# Patient Record
Sex: FEMALE | Race: WHITE | NOT HISPANIC OR LATINO | Employment: PART TIME | URBAN - METROPOLITAN AREA
[De-identification: names, ages, dates, MRNs, and addresses within clinical notes are randomized per-mention and may not be internally consistent; named-entity substitution may affect disease eponyms.]

---

## 2017-02-19 ENCOUNTER — HOSPITAL ENCOUNTER (EMERGENCY)
Facility: HOSPITAL | Age: 47
Discharge: HOME | End: 2017-02-19
Attending: EMERGENCY MEDICINE | Admitting: EMERGENCY MEDICINE
Payer: COMMERCIAL

## 2017-02-19 VITALS
HEART RATE: 110 BPM | TEMPERATURE: 98.3 F | SYSTOLIC BLOOD PRESSURE: 170 MMHG | WEIGHT: 171.08 LBS | RESPIRATION RATE: 18 BRPM | OXYGEN SATURATION: 97 % | DIASTOLIC BLOOD PRESSURE: 94 MMHG

## 2017-02-19 DIAGNOSIS — J03.00 STREP TONSILLITIS: Primary | ICD-10-CM

## 2017-02-19 DIAGNOSIS — R31.9 HEMATURIA: ICD-10-CM

## 2017-02-19 LAB
ANION GAP SERPL CALCULATED.3IONS-SCNC: 10 MMOL/L (ref 4–13)
BACTERIA UR QL AUTO: ABNORMAL /HPF
BASOPHILS # BLD MANUAL: 0 THOUSAND/UL (ref 0–0.1)
BASOPHILS NFR MAR MANUAL: 0 % (ref 0–1)
BILIRUB UR QL STRIP: NEGATIVE
BUN SERPL-MCNC: 9 MG/DL (ref 5–25)
CALCIUM SERPL-MCNC: 8.9 MG/DL (ref 8.3–10.1)
CHLORIDE SERPL-SCNC: 103 MMOL/L (ref 100–108)
CK SERPL-CCNC: 70 U/L (ref 26–192)
CLARITY UR: ABNORMAL
CLARITY, POC: ABNORMAL
CO2 SERPL-SCNC: 26 MMOL/L (ref 21–32)
COLOR UR: YELLOW
COLOR, POC: ABNORMAL
CREAT SERPL-MCNC: 0.93 MG/DL (ref 0.6–1.3)
EOSINOPHIL # BLD MANUAL: 0 THOUSAND/UL (ref 0–0.4)
EOSINOPHIL NFR BLD MANUAL: 0 % (ref 0–6)
ERYTHROCYTE [DISTWIDTH] IN BLOOD BY AUTOMATED COUNT: 14.4 % (ref 11.6–15.1)
EXT BILIRUBIN, UA: NEGATIVE
EXT BLOOD URINE: ABNORMAL
EXT GLUCOSE, UA: NEGATIVE
EXT KETONES: NEGATIVE
EXT NITRITE, UA: NEGATIVE
EXT PH, UA: 6.5
EXT PROTEIN, UA: NEGATIVE
EXT SPECIFIC GRAVITY, UA: 1
EXT UROBILINOGEN: NEGATIVE
GFR SERPL CREATININE-BSD FRML MDRD: >60 ML/MIN/1.73SQ M
GLUCOSE SERPL-MCNC: 108 MG/DL (ref 65–140)
GLUCOSE UR STRIP-MCNC: NEGATIVE MG/DL
HCG UR QL: NEGATIVE
HCT VFR BLD AUTO: 36.2 % (ref 34.8–46.1)
HGB BLD-MCNC: 12.6 G/DL (ref 11.5–15.4)
HGB UR QL STRIP.AUTO: ABNORMAL
KETONES UR STRIP-MCNC: NEGATIVE MG/DL
LEUKOCYTE ESTERASE UR QL STRIP: NEGATIVE
LYMPHOCYTES # BLD AUTO: 0.53 THOUSAND/UL (ref 0.6–4.47)
LYMPHOCYTES # BLD AUTO: 3 % (ref 14–44)
MCH RBC QN AUTO: 28.3 PG (ref 26.8–34.3)
MCHC RBC AUTO-ENTMCNC: 34.8 G/DL (ref 31.4–37.4)
MCV RBC AUTO: 81 FL (ref 82–98)
MONOCYTES # BLD AUTO: 0.88 THOUSAND/UL (ref 0–1.22)
MONOCYTES NFR BLD: 5 % (ref 4–12)
NEUTROPHILS # BLD MANUAL: 16.12 THOUSAND/UL (ref 1.85–7.62)
NEUTS BAND NFR BLD MANUAL: 2 % (ref 0–8)
NEUTS SEG NFR BLD AUTO: 90 % (ref 43–75)
NITRITE UR QL STRIP: NEGATIVE
NON-SQ EPI CELLS URNS QL MICRO: ABNORMAL /HPF
PH UR STRIP.AUTO: 6.5 [PH] (ref 4.5–8)
PLATELET # BLD AUTO: 179 THOUSANDS/UL (ref 149–390)
PLATELET BLD QL SMEAR: ADEQUATE
PMV BLD AUTO: 10.6 FL (ref 8.9–12.7)
POTASSIUM SERPL-SCNC: 3.7 MMOL/L (ref 3.5–5.3)
PROT UR STRIP-MCNC: NEGATIVE MG/DL
RBC # BLD AUTO: 4.46 MILLION/UL (ref 3.81–5.12)
RBC #/AREA URNS AUTO: ABNORMAL /HPF
SODIUM SERPL-SCNC: 139 MMOL/L (ref 136–145)
SP GR UR STRIP.AUTO: <=1.005 (ref 1–1.03)
TOTAL CELLS COUNTED SPEC: 100
UROBILINOGEN UR QL STRIP.AUTO: 1 E.U./DL
WBC # BLD AUTO: 17.52 THOUSAND/UL (ref 4.31–10.16)
WBC # BLD EST: NEGATIVE 10*3/UL
WBC #/AREA URNS AUTO: ABNORMAL /HPF

## 2017-02-19 PROCEDURE — 96360 HYDRATION IV INFUSION INIT: CPT

## 2017-02-19 PROCEDURE — 36415 COLL VENOUS BLD VENIPUNCTURE: CPT | Performed by: EMERGENCY MEDICINE

## 2017-02-19 PROCEDURE — 80048 BASIC METABOLIC PNL TOTAL CA: CPT | Performed by: EMERGENCY MEDICINE

## 2017-02-19 PROCEDURE — 82550 ASSAY OF CK (CPK): CPT | Performed by: EMERGENCY MEDICINE

## 2017-02-19 PROCEDURE — 81001 URINALYSIS AUTO W/SCOPE: CPT | Performed by: EMERGENCY MEDICINE

## 2017-02-19 PROCEDURE — 99283 EMERGENCY DEPT VISIT LOW MDM: CPT

## 2017-02-19 PROCEDURE — 81025 URINE PREGNANCY TEST: CPT | Performed by: EMERGENCY MEDICINE

## 2017-02-19 PROCEDURE — 85027 COMPLETE CBC AUTOMATED: CPT | Performed by: EMERGENCY MEDICINE

## 2017-02-19 PROCEDURE — 85007 BL SMEAR W/DIFF WBC COUNT: CPT | Performed by: EMERGENCY MEDICINE

## 2017-02-19 PROCEDURE — 96361 HYDRATE IV INFUSION ADD-ON: CPT

## 2017-02-19 PROCEDURE — 81002 URINALYSIS NONAUTO W/O SCOPE: CPT | Performed by: EMERGENCY MEDICINE

## 2017-02-19 PROCEDURE — 87086 URINE CULTURE/COLONY COUNT: CPT | Performed by: EMERGENCY MEDICINE

## 2017-02-19 RX ORDER — METOPROLOL TARTRATE 50 MG/1
50 TABLET, FILM COATED ORAL
COMMUNITY
End: 2018-03-16 | Stop reason: CLARIF

## 2017-02-19 RX ORDER — ALPRAZOLAM 0.5 MG/1
0.5 TABLET ORAL
COMMUNITY
End: 2018-02-09 | Stop reason: SDUPTHER

## 2017-02-19 RX ORDER — AMLODIPINE BESYLATE 10 MG/1
10 TABLET ORAL DAILY
COMMUNITY
End: 2018-04-24 | Stop reason: SDUPTHER

## 2017-02-19 RX ORDER — PENICILLIN V POTASSIUM 250 MG/1
500 TABLET ORAL ONCE
Status: COMPLETED | OUTPATIENT
Start: 2017-02-19 | End: 2017-02-19

## 2017-02-19 RX ORDER — PENICILLIN V POTASSIUM 500 MG/1
500 TABLET ORAL 4 TIMES DAILY
Qty: 40 TABLET | Refills: 0 | Status: SHIPPED | OUTPATIENT
Start: 2017-02-19 | End: 2017-02-26

## 2017-02-19 RX ADMIN — PENICILLIN V POTASSIUM 500 MG: 250 TABLET ORAL at 16:53

## 2017-02-19 RX ADMIN — SODIUM CHLORIDE 1000 ML: 0.9 INJECTION, SOLUTION INTRAVENOUS at 15:00

## 2017-02-21 ENCOUNTER — ALLSCRIPTS OFFICE VISIT (OUTPATIENT)
Dept: OTHER | Facility: OTHER | Age: 47
End: 2017-02-21

## 2017-02-21 DIAGNOSIS — R31.29 OTHER MICROSCOPIC HEMATURIA: ICD-10-CM

## 2017-02-21 DIAGNOSIS — N30.01 ACUTE CYSTITIS WITH HEMATURIA: ICD-10-CM

## 2017-02-21 DIAGNOSIS — E78.1 PURE HYPERGLYCERIDEMIA: ICD-10-CM

## 2017-02-21 LAB
BACTERIA UR CULT: NORMAL
BILIRUB UR QL STRIP: NEGATIVE
CLARITY UR: ABNORMAL
COLOR UR: YELLOW
GLUCOSE (HISTORICAL): NEGATIVE
HGB UR QL STRIP.AUTO: ABNORMAL
KETONES UR STRIP-MCNC: NEGATIVE MG/DL
LEUKOCYTE ESTERASE UR QL STRIP: NEGATIVE
NITRITE UR QL STRIP: NEGATIVE
PH UR STRIP.AUTO: 6 [PH]
PROT UR STRIP-MCNC: NEGATIVE MG/DL
SP GR UR STRIP.AUTO: 1.01
UROBILINOGEN UR QL STRIP.AUTO: 1

## 2017-02-28 ENCOUNTER — TRANSCRIBE ORDERS (OUTPATIENT)
Dept: LAB | Age: 47
End: 2017-02-28

## 2017-02-28 ENCOUNTER — APPOINTMENT (OUTPATIENT)
Dept: LAB | Age: 47
End: 2017-02-28
Payer: COMMERCIAL

## 2017-02-28 DIAGNOSIS — E78.1 PURE HYPERGLYCERIDEMIA: ICD-10-CM

## 2017-02-28 DIAGNOSIS — E55.9 VITAMIN D DEFICIENCY: ICD-10-CM

## 2017-02-28 DIAGNOSIS — N30.01 ACUTE CYSTITIS WITH HEMATURIA: ICD-10-CM

## 2017-02-28 LAB
25(OH)D3 SERPL-MCNC: 53.3 NG/ML (ref 30–100)
ALBUMIN SERPL BCP-MCNC: 3.9 G/DL (ref 3.5–5)
ALP SERPL-CCNC: 50 U/L (ref 46–116)
ALT SERPL W P-5'-P-CCNC: 24 U/L (ref 12–78)
ANION GAP SERPL CALCULATED.3IONS-SCNC: 6 MMOL/L (ref 4–13)
ANISOCYTOSIS BLD QL SMEAR: PRESENT
AST SERPL W P-5'-P-CCNC: 11 U/L (ref 5–45)
BASOPHILS # BLD MANUAL: 0.08 THOUSAND/UL (ref 0–0.1)
BASOPHILS NFR MAR MANUAL: 1 % (ref 0–1)
BILIRUB SERPL-MCNC: 0.61 MG/DL (ref 0.2–1)
BUN SERPL-MCNC: 14 MG/DL (ref 5–25)
CALCIUM SERPL-MCNC: 9 MG/DL (ref 8.3–10.1)
CHLORIDE SERPL-SCNC: 103 MMOL/L (ref 100–108)
CHOLEST SERPL-MCNC: 134 MG/DL (ref 50–200)
CK SERPL-CCNC: 62 U/L (ref 26–192)
CO2 SERPL-SCNC: 30 MMOL/L (ref 21–32)
CREAT SERPL-MCNC: 0.96 MG/DL (ref 0.6–1.3)
EOSINOPHIL # BLD MANUAL: 0 THOUSAND/UL (ref 0–0.4)
EOSINOPHIL NFR BLD MANUAL: 0 % (ref 0–6)
ERYTHROCYTE [DISTWIDTH] IN BLOOD BY AUTOMATED COUNT: 14 % (ref 11.6–15.1)
GFR SERPL CREATININE-BSD FRML MDRD: >60 ML/MIN/1.73SQ M
GLUCOSE SERPL-MCNC: 98 MG/DL (ref 65–140)
HCT VFR BLD AUTO: 35.1 % (ref 34.8–46.1)
HDLC SERPL-MCNC: 47 MG/DL (ref 40–60)
HGB BLD-MCNC: 11.9 G/DL (ref 11.5–15.4)
LDLC SERPL CALC-MCNC: 60 MG/DL (ref 0–100)
LG PLATELETS BLD QL SMEAR: PRESENT
LYMPHOCYTES # BLD AUTO: 1.5 THOUSAND/UL (ref 0.6–4.47)
LYMPHOCYTES # BLD AUTO: 19 % (ref 14–44)
MCH RBC QN AUTO: 27.7 PG (ref 26.8–34.3)
MCHC RBC AUTO-ENTMCNC: 33.9 G/DL (ref 31.4–37.4)
MCV RBC AUTO: 82 FL (ref 82–98)
METAMYELOCYTES NFR BLD MANUAL: 1 % (ref 0–1)
MONOCYTES # BLD AUTO: 0 THOUSAND/UL (ref 0–1.22)
MONOCYTES NFR BLD: 0 % (ref 4–12)
NEUTROPHILS # BLD MANUAL: 6.18 THOUSAND/UL (ref 1.85–7.62)
NEUTS SEG NFR BLD AUTO: 78 % (ref 43–75)
NRBC BLD AUTO-RTO: 0 /100 WBCS
NRBC BLD AUTO-RTO: 1 /100 WBC (ref 0–2)
PLATELET # BLD AUTO: 304 THOUSANDS/UL (ref 149–390)
PLATELET BLD QL SMEAR: ADEQUATE
PMV BLD AUTO: 10.7 FL (ref 8.9–12.7)
POTASSIUM SERPL-SCNC: 4 MMOL/L (ref 3.5–5.3)
PROMYELOCYTES NFR BLD MANUAL: 1 % (ref 0–0)
PROT SERPL-MCNC: 7.1 G/DL (ref 6.4–8.2)
RBC # BLD AUTO: 4.29 MILLION/UL (ref 3.81–5.12)
RBC MORPH BLD: PRESENT
SODIUM SERPL-SCNC: 139 MMOL/L (ref 136–145)
TRIGL SERPL-MCNC: 135 MG/DL
WBC # BLD AUTO: 7.92 THOUSAND/UL (ref 4.31–10.16)

## 2017-02-28 PROCEDURE — 80053 COMPREHEN METABOLIC PANEL: CPT

## 2017-02-28 PROCEDURE — 36415 COLL VENOUS BLD VENIPUNCTURE: CPT

## 2017-02-28 PROCEDURE — 85027 COMPLETE CBC AUTOMATED: CPT

## 2017-02-28 PROCEDURE — 80061 LIPID PANEL: CPT

## 2017-02-28 PROCEDURE — 85007 BL SMEAR W/DIFF WBC COUNT: CPT

## 2017-02-28 PROCEDURE — 82550 ASSAY OF CK (CPK): CPT

## 2017-02-28 PROCEDURE — 82306 VITAMIN D 25 HYDROXY: CPT

## 2017-03-06 ENCOUNTER — APPOINTMENT (OUTPATIENT)
Dept: LAB | Age: 47
End: 2017-03-06
Payer: COMMERCIAL

## 2017-03-06 ENCOUNTER — TRANSCRIBE ORDERS (OUTPATIENT)
Dept: LAB | Age: 47
End: 2017-03-06

## 2017-03-06 DIAGNOSIS — N30.01 ACUTE CYSTITIS WITH HEMATURIA: ICD-10-CM

## 2017-03-06 LAB
BACTERIA UR QL AUTO: NORMAL /HPF
BILIRUB UR QL STRIP: NEGATIVE
CLARITY UR: CLEAR
COLOR UR: YELLOW
GLUCOSE UR STRIP-MCNC: NEGATIVE MG/DL
HGB UR QL STRIP.AUTO: ABNORMAL
HYALINE CASTS #/AREA URNS LPF: NORMAL /LPF
KETONES UR STRIP-MCNC: NEGATIVE MG/DL
LEUKOCYTE ESTERASE UR QL STRIP: NEGATIVE
NITRITE UR QL STRIP: NEGATIVE
NON-SQ EPI CELLS URNS QL MICRO: NORMAL /HPF
PH UR STRIP.AUTO: 6.5 [PH] (ref 4.5–8)
PROT UR STRIP-MCNC: NEGATIVE MG/DL
RBC #/AREA URNS AUTO: NORMAL /HPF
SP GR UR STRIP.AUTO: 1 (ref 1–1.03)
UROBILINOGEN UR QL STRIP.AUTO: 0.2 E.U./DL
WBC #/AREA URNS AUTO: NORMAL /HPF

## 2017-03-06 PROCEDURE — 87086 URINE CULTURE/COLONY COUNT: CPT

## 2017-03-06 PROCEDURE — 81001 URINALYSIS AUTO W/SCOPE: CPT

## 2017-03-07 LAB — BACTERIA UR CULT: NORMAL

## 2017-03-08 ENCOUNTER — HOSPITAL ENCOUNTER (OUTPATIENT)
Dept: RADIOLOGY | Age: 47
Discharge: HOME | End: 2017-03-08
Payer: COMMERCIAL

## 2017-03-08 DIAGNOSIS — R31.29 OTHER MICROSCOPIC HEMATURIA: ICD-10-CM

## 2017-03-08 PROCEDURE — 76770 US EXAM ABDO BACK WALL COMP: CPT

## 2017-04-13 ENCOUNTER — ALLSCRIPTS OFFICE VISIT (OUTPATIENT)
Dept: OTHER | Facility: OTHER | Age: 47
End: 2017-04-13

## 2017-04-13 DIAGNOSIS — I10 ESSENTIAL (PRIMARY) HYPERTENSION: ICD-10-CM

## 2017-04-13 DIAGNOSIS — R31.9 HEMATURIA: ICD-10-CM

## 2017-04-13 DIAGNOSIS — E78.1 PURE HYPERGLYCERIDEMIA: ICD-10-CM

## 2017-04-13 LAB
BILIRUB UR QL STRIP: NEGATIVE
CLARITY UR: NORMAL
COLOR UR: CLEAR
GLUCOSE (HISTORICAL): NEGATIVE
HGB UR QL STRIP.AUTO: NORMAL
KETONES UR STRIP-MCNC: NEGATIVE MG/DL
LEUKOCYTE ESTERASE UR QL STRIP: NEGATIVE
NITRITE UR QL STRIP: NEGATIVE
PH UR STRIP.AUTO: 7 [PH]
PROT UR STRIP-MCNC: NEGATIVE MG/DL
SP GR UR STRIP.AUTO: 1.01
UROBILINOGEN UR QL STRIP.AUTO: 0.2

## 2017-04-14 ENCOUNTER — APPOINTMENT (OUTPATIENT)
Dept: LAB | Facility: HOSPITAL | Age: 47
End: 2017-04-14
Attending: FAMILY MEDICINE
Payer: COMMERCIAL

## 2017-04-14 DIAGNOSIS — R31.9 HEMATURIA: ICD-10-CM

## 2017-04-14 LAB
BACTERIA UR QL AUTO: NORMAL /HPF
BILIRUB UR QL STRIP: NEGATIVE
CLARITY UR: CLEAR
COLOR UR: YELLOW
GLUCOSE UR STRIP-MCNC: NEGATIVE MG/DL
HGB UR QL STRIP.AUTO: ABNORMAL
HYALINE CASTS #/AREA URNS LPF: NORMAL /LPF
KETONES UR STRIP-MCNC: NEGATIVE MG/DL
LEUKOCYTE ESTERASE UR QL STRIP: NEGATIVE
NITRITE UR QL STRIP: NEGATIVE
NON-SQ EPI CELLS URNS QL MICRO: NORMAL /HPF
PH UR STRIP.AUTO: 7 [PH] (ref 4.5–8)
PROT UR STRIP-MCNC: NEGATIVE MG/DL
RBC #/AREA URNS AUTO: NORMAL /HPF
SP GR UR STRIP.AUTO: 1 (ref 1–1.03)
UROBILINOGEN UR QL STRIP.AUTO: 0.2 E.U./DL
WBC #/AREA URNS AUTO: NORMAL /HPF

## 2017-04-14 PROCEDURE — 81001 URINALYSIS AUTO W/SCOPE: CPT

## 2017-05-05 ENCOUNTER — TRANSCRIBE ORDERS (OUTPATIENT)
Dept: LAB | Age: 47
End: 2017-05-05

## 2017-05-05 ENCOUNTER — APPOINTMENT (OUTPATIENT)
Dept: LAB | Age: 47
End: 2017-05-05
Payer: COMMERCIAL

## 2017-05-05 DIAGNOSIS — R31.9 HEMATURIA SYNDROME: Primary | ICD-10-CM

## 2017-05-05 LAB
ANION GAP SERPL CALCULATED.3IONS-SCNC: 7 MMOL/L (ref 4–13)
BUN SERPL-MCNC: 10 MG/DL (ref 5–25)
CALCIUM SERPL-MCNC: 9.6 MG/DL (ref 8.3–10.1)
CHLORIDE SERPL-SCNC: 106 MMOL/L (ref 100–108)
CO2 SERPL-SCNC: 28 MMOL/L (ref 21–32)
CREAT SERPL-MCNC: 0.9 MG/DL (ref 0.6–1.3)
GFR SERPL CREATININE-BSD FRML MDRD: >60 ML/MIN/1.73SQ M
GLUCOSE P FAST SERPL-MCNC: 91 MG/DL (ref 65–99)
POTASSIUM SERPL-SCNC: 4 MMOL/L (ref 3.5–5.3)
SODIUM SERPL-SCNC: 141 MMOL/L (ref 136–145)

## 2017-05-05 PROCEDURE — 36415 COLL VENOUS BLD VENIPUNCTURE: CPT

## 2017-05-05 PROCEDURE — 80048 BASIC METABOLIC PNL TOTAL CA: CPT

## 2017-06-21 ENCOUNTER — ALLSCRIPTS OFFICE VISIT (OUTPATIENT)
Dept: OTHER | Facility: OTHER | Age: 47
End: 2017-06-21

## 2017-06-21 LAB — S PYO AG THROAT QL: POSITIVE

## 2017-08-18 ENCOUNTER — APPOINTMENT (OUTPATIENT)
Dept: LAB | Age: 47
End: 2017-08-18
Payer: COMMERCIAL

## 2017-08-18 ENCOUNTER — TRANSCRIBE ORDERS (OUTPATIENT)
Dept: LAB | Age: 47
End: 2017-08-18

## 2017-08-18 DIAGNOSIS — E78.1 PURE HYPERGLYCERIDEMIA: ICD-10-CM

## 2017-08-18 DIAGNOSIS — I10 ESSENTIAL (PRIMARY) HYPERTENSION: ICD-10-CM

## 2017-08-18 LAB
ALBUMIN SERPL BCP-MCNC: 4.2 G/DL (ref 3.5–5)
ALP SERPL-CCNC: 45 U/L (ref 46–116)
ALT SERPL W P-5'-P-CCNC: 26 U/L (ref 12–78)
ANION GAP SERPL CALCULATED.3IONS-SCNC: 7 MMOL/L (ref 4–13)
AST SERPL W P-5'-P-CCNC: 17 U/L (ref 5–45)
BILIRUB SERPL-MCNC: 0.68 MG/DL (ref 0.2–1)
BUN SERPL-MCNC: 9 MG/DL (ref 5–25)
CALCIUM SERPL-MCNC: 9.6 MG/DL (ref 8.3–10.1)
CHLORIDE SERPL-SCNC: 104 MMOL/L (ref 100–108)
CHOLEST SERPL-MCNC: 122 MG/DL (ref 50–200)
CK SERPL-CCNC: 70 U/L (ref 26–192)
CO2 SERPL-SCNC: 27 MMOL/L (ref 21–32)
CREAT SERPL-MCNC: 0.85 MG/DL (ref 0.6–1.3)
ERYTHROCYTE [DISTWIDTH] IN BLOOD BY AUTOMATED COUNT: 14.6 % (ref 11.6–15.1)
GFR SERPL CREATININE-BSD FRML MDRD: 82 ML/MIN/1.73SQ M
GLUCOSE P FAST SERPL-MCNC: 97 MG/DL (ref 65–99)
HCT VFR BLD AUTO: 35.5 % (ref 34.8–46.1)
HDLC SERPL-MCNC: 53 MG/DL (ref 40–60)
HGB BLD-MCNC: 12.4 G/DL (ref 11.5–15.4)
LDLC SERPL CALC-MCNC: 49 MG/DL (ref 0–100)
MCH RBC QN AUTO: 28.9 PG (ref 26.8–34.3)
MCHC RBC AUTO-ENTMCNC: 34.9 G/DL (ref 31.4–37.4)
MCV RBC AUTO: 83 FL (ref 82–98)
PLATELET # BLD AUTO: 252 THOUSANDS/UL (ref 149–390)
PMV BLD AUTO: 10.9 FL (ref 8.9–12.7)
POTASSIUM SERPL-SCNC: 3.6 MMOL/L (ref 3.5–5.3)
PROT SERPL-MCNC: 7.3 G/DL (ref 6.4–8.2)
RBC # BLD AUTO: 4.29 MILLION/UL (ref 3.81–5.12)
SODIUM SERPL-SCNC: 138 MMOL/L (ref 136–145)
TRIGL SERPL-MCNC: 98 MG/DL
WBC # BLD AUTO: 6.15 THOUSAND/UL (ref 4.31–10.16)

## 2017-08-18 PROCEDURE — 82550 ASSAY OF CK (CPK): CPT

## 2017-08-18 PROCEDURE — 85027 COMPLETE CBC AUTOMATED: CPT

## 2017-08-18 PROCEDURE — 80061 LIPID PANEL: CPT

## 2017-08-18 PROCEDURE — 36415 COLL VENOUS BLD VENIPUNCTURE: CPT

## 2017-08-18 PROCEDURE — 80053 COMPREHEN METABOLIC PANEL: CPT

## 2017-08-21 ENCOUNTER — ALLSCRIPTS OFFICE VISIT (OUTPATIENT)
Dept: OTHER | Facility: OTHER | Age: 47
End: 2017-08-21

## 2017-08-21 DIAGNOSIS — I10 ESSENTIAL (PRIMARY) HYPERTENSION: ICD-10-CM

## 2017-08-21 DIAGNOSIS — E78.1 PURE HYPERGLYCERIDEMIA: ICD-10-CM

## 2018-01-12 VITALS
HEIGHT: 63 IN | HEART RATE: 84 BPM | DIASTOLIC BLOOD PRESSURE: 84 MMHG | OXYGEN SATURATION: 98 % | BODY MASS INDEX: 30.88 KG/M2 | TEMPERATURE: 99.9 F | WEIGHT: 174.25 LBS | SYSTOLIC BLOOD PRESSURE: 120 MMHG

## 2018-01-13 VITALS
OXYGEN SATURATION: 98 % | HEIGHT: 63 IN | TEMPERATURE: 97.8 F | SYSTOLIC BLOOD PRESSURE: 124 MMHG | HEART RATE: 80 BPM | BODY MASS INDEX: 30.3 KG/M2 | DIASTOLIC BLOOD PRESSURE: 86 MMHG | WEIGHT: 171 LBS

## 2018-01-13 VITALS
TEMPERATURE: 98.6 F | BODY MASS INDEX: 31.31 KG/M2 | SYSTOLIC BLOOD PRESSURE: 140 MMHG | WEIGHT: 170.13 LBS | DIASTOLIC BLOOD PRESSURE: 100 MMHG | HEART RATE: 82 BPM | HEIGHT: 62 IN | OXYGEN SATURATION: 99 %

## 2018-01-14 VITALS
DIASTOLIC BLOOD PRESSURE: 84 MMHG | HEART RATE: 119 BPM | SYSTOLIC BLOOD PRESSURE: 128 MMHG | BODY MASS INDEX: 29.59 KG/M2 | HEIGHT: 63 IN | WEIGHT: 167 LBS | TEMPERATURE: 99 F | OXYGEN SATURATION: 98 %

## 2018-01-16 NOTE — RESULT NOTES
Verified Results  (1) LIPID PANEL, FASTING 22Apr2016 07:14AM Sommer Lentz   Triglyceride:         Normal              <150 mg/dl       Borderline High    150-199 mg/dl       High               200-499 mg/dl       Very High          >499 mg/dl  Cholesterol:         Desirable        <200 mg/dl      Borderline High  200-239 mg/dl      High             >239 mg/dl  HDL Cholesterol:        High    >59 mg/dL      Low     <41 mg/dL  LDL CALCULATED:    This screening LDL is a calculated result  It does not have the accuracy of the Direct Measured LDL in the monitoring of patients with hyperlipidemia and/or statin therapy  Direct Measure LDL (ZSN666) must be ordered separately in these patients  Test Name Result Flag Reference   CHOLESTEROL 135 mg/dL     HDL,DIRECT 41 mg/dL  40-60   Specimen collection should occur prior to Metamizole administration due to the potential for falsely depressed results  LDL CHOLESTEROL CALCULATED 41 mg/dL  0-100   TRIGLYCERIDES 263 mg/dL H <=150   Specimen collection should occur prior to N-Acetylcysteine or Metamizole administration due to the potential for falsely depressed results

## 2018-01-17 ENCOUNTER — ALLSCRIPTS OFFICE VISIT (OUTPATIENT)
Dept: OTHER | Facility: OTHER | Age: 48
End: 2018-01-17

## 2018-01-17 LAB
FLUAV AG SPEC QL IA: NEGATIVE
INFLUENZA B AG (HISTORICAL): NEGATIVE

## 2018-01-17 NOTE — RESULT NOTES
Verified Results  (1) TSH [id number] 07:04AM Scott Bracket    Order Number: [id number]     Test Name Result Flag Reference   TSH 2 160 uIU/mL  0 358-3 740   - Patient Instructions: This bloodwork is non-fasting  Please drink two glasses of water morning of bloodwork  - Patient Instructions: This bloodwork is non-fasting  Please drink two glasses of water morning of bloodwork  Patients undergoing fluorescein dye angiography may retain small amounts of fluorescein in the body for 48-72 hours post procedure  Samples containing fluorescein can produce falsely depressed TSH values  If the patient had this procedure,a specimen should be resubmitted post fluorescein clearance            The recommended reference ranges for TSH during pregnancy are as follows:  First trimester 0 1 to 2 5 uIU/mL  Second trimester  0 2 to 3 0 uIU/mL  Third trimester 0 3 to 3 0 uIU/m       Plan  Need for prophylactic vaccination and inoculation against influenza    · Fluzone Quadrivalent 0 5 ML Intramuscular Suspension

## 2018-01-18 NOTE — PROGRESS NOTES
Assessment   1  Benign essential hypertension (401 1) (I10)  2  Acute frontal sinusitis, recurrence not specified (461 1) (J01 10)  3  Allergic rhinitis (477 9) (J30 9)  4  Cough (786 2) (R05)  5  Fever and chills (780 60) (R50 9)    Plan   Acute frontal sinusitis, recurrence not specified, Cough    · Start: PredniSONE 10 MG Oral Tablet; take 4 tablets x 3 days then 3 tablets x 3 days then    2 tablets x 3 days then 1 tablet x 3 days then stop  Cough    · Start: Promethazine-Codeine 6 25-10 MG/5ML Oral Syrup; TAKE 5 ML EVERY 4 TO 6    HOURS AS NEEDED FOR COUGH   · Drink at least 6 glasses of clear liquids a day ; Status:Complete;   Done: 58BNP0539  Fever and chills    · Rapid Flu A and B- POC; Source:Nose; Status:Complete;   Done: 28RXD8013 12:05PM    Discussion/Summary      Pt instructed to take prednisone with food in am    discussed impt of taking probiotic with antibiotics which she is agreeable   next week if not improved will need CXR if continues  Possible side effects of new medications were reviewed with the patient/guardian today  The treatment plan was reviewed with the patient/guardian   The patient/guardian understands and agrees with the treatment plan      Chief Complaint   pt c/o coughing, fever and sinus pressure and congestion, went to [facility] on monday they gave her augmentin and it is not get any better throat fine nothing else is better      History of Present Illness   HPI: c/o cough and congestion in sinuses x 3 days went to urgent care and was prescribed augmentin due to positive rapid strep test throat symptoms but pt is very upset and concerned that she has such nasal congestion that she can't breath out of her nose and she is coughing all night is having bloody mucus from L nostril  night she took out a humidifier and used in her bedroom to help with dryness relief of congestion with tessalon or mucinex  rest of her family and kids have been sick with URI and bronchitis  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      Review of Systems        Constitutional: feeling tired, but-- no fever,-- not feeling poorly-- and-- no chills  ENT: earache-- and-- nasal discharge, but-- no sore throat  Cardiovascular: no chest pain,-- no intermittent leg claudication-- and-- no palpitations  Respiratory: cough  Gastrointestinal: no abdominal pain,-- no nausea,-- no constipation-- and-- no diarrhea  Genitourinary: no dysuria  Musculoskeletal: no arthralgias,-- no joint swelling-- and-- no myalgias  Integumentary: no rashes  Neurological: no headache,-- no numbness-- and-- no dizziness  ROS reviewed  Active Problems   1  Anxiety (300 00) (F41 9)  2  Benign essential hypertension (401 1) (I10)  3  Essential hypertriglyceridemia (272 1) (E78 1)  4  Generalized anxiety disorder (300 02) (F41 1)  5  Hair loss (704 00) (L65 9)  6  Leucocytosis (288 60) (D72 829)  7  LATOSHA (obstructive sleep apnea) (327 23) (G47 33)  8  Positive depression screening (796 4) (Z13 89)  9  Seasonal allergies (477 9) (J30 2)  10  Vitamin D deficiency (268 9) (E55 9)  11  White coat syndrome with hypertension (401 9) (I10)    Past Medical History   Active Problems And Past Medical History Reviewed: The active problems and past medical history were reviewed and updated today  Surgical History   Surgical History Reviewed: The surgical history was reviewed and updated today  Social History    · Denied: History of Alcohol Use (History)   · Denied: History of Drug Use   · Exercise Habits   · WEIGHTLIFTING AND CARDIO, 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   · Marital History - Currently    · Never A Smoker   · Occupation: Homemaker   · Recreational Activities Weightlifting  The social history was reviewed and updated today  Family History   Family History Reviewed: The family history was reviewed and updated today  Current Meds   1   Acyclovir 5 % External Ointment; LOCAL USE 4 TIMES A DAY AS DIRECTED  Requested     for: 91SAR0920; Last NK:35LEU0078 Ordered  2  ALPRAZolam 0 5 MG Oral Tablet; 1/2- 1 tablet prn  Requested for: 80MCE9232; Last     Rx:[id number] Ordered  3  AmLODIPine Besylate 10 MG Oral Tablet; TAKE 1 TABLET DAILY  Requested for:     65VYO1891; Last Rx:19Jpv1379 Ordered  4  BusPIRone HCl - 5 MG Oral Tablet; TAKE 1 TABLET DAILY AS DIRECTED; Therapy: 92Cwi2593 to (Evaluate:93Hnd6936)  Requested for: 24IVB7212; Last     Rx:[id number]; Status: ACTIVE - Renewal Denied Ordered  5  Fenofibrate 48 MG Oral Tablet; TAKE 1 TABLET DAILY; Therapy: 51YYF6723 to (Arely Cota)  Requested for: 15Ozj0745; Last     Rx:[id number] Ordered  6  Lisinopril 20 MG Oral Tablet; TAKE 1 TABLET DAILY AS DIRECTED; Therapy: 07TBF2977 to (Arely Cota)  Requested for: 14Wbu6867; Last     Rx:[id number] Ordered  7  Metoprolol Succinate ER 50 MG Oral Tablet Extended Release 24 Hour; TAKE 1 TABLET     DAILY; Therapy: 85MCO7743 to (Evaluate:93Gpy4712)  Requested for: 67Aib3034; Last     Rx:91Ugt3216; Status: ACTIVE - Renewal Denied Ordered  8  Vitamin D (Ergocalciferol) 92784 UNIT Oral Capsule; TAKE 1 CAPSULE WEEKLY; Therapy: 54XRJ4876 to (Last Rx:94Wge5563)  Requested for: 43Lon6445 Ordered     The medication list was reviewed and updated today  Allergies   1  Levaquin TABS  2  No Known Food Allergies  3  Seasonal    Vitals    Recorded: 94FWR8340 11:01AM   Temperature 100 1 F, Tympanic   Heart Rate 906   Systolic 043, RUE, Sitting   Diastolic 88, RUE, Sitting   Height 5 ft 3 in   Weight 174 lb 4 oz   BMI Calculated 30 87   BSA Calculated 1 82   O2 Saturation 99, RA     Physical Exam        Constitutional      General appearance: No acute distress, well appearing and well nourished  Eyes      Conjunctiva and lids: No swelling, erythema or discharge  Pupils and irises: Equal, round and reactive to light         Ears, Nose, Mouth, and Throat      External inspection of ears and nose: Normal        Otoscopic examination: Abnormal  -- mild erythema and clear fluid b/l  Nasal mucosa, septum, and turbinates: Normal without edema or erythema  Oropharynx: Normal with no erythema, edema, exudate or lesions  Pulmonary      Respiratory effort: No increased work of breathing or signs of respiratory distress  Auscultation of lungs: Abnormal  -- end exp wheeze b/l bases  Cardiovascular      Auscultation of heart: Abnormal  -- sinus tachy  Examination of extremities for edema and/or varicosities: Normal        Lymphatic      Palpation of lymph nodes in neck: No lymphadenopathy  -- no anterior lymphadenopathy  Musculoskeletal      Gait and station: Normal        Inspection/palpation of joints, bones, and muscles: Normal        Skin      Skin and subcutaneous tissue: Normal without rashes or lesions  Neurologic      Cranial nerves: Cranial nerves 2-12 intact         Psychiatric      Orientation to person, place, and time: Normal        Mood and affect: Normal           Results/Data   Rapid Flu A and B- POC 30VFH4449 12:05PM Ezequiel Brookings      Test Name Result Flag Reference   Rapid Flu A Negative     Rapid Flu B Negative          Future Appointments      Date/Time Provider Specialty Site   02/13/2018 03:15 PM Ciarra Boo DO Internal Medicine [facility]     Signatures    Electronically signed by : Pj Jones [facility]; Jan 17 2018 12:07PM EST                       (Author)     Electronically signed by : Chelle Webb MD; Jan 17 2018  1:15PM EST                       (Author)

## 2018-01-22 VITALS
WEIGHT: 174.25 LBS | SYSTOLIC BLOOD PRESSURE: 132 MMHG | HEART RATE: 127 BPM | DIASTOLIC BLOOD PRESSURE: 88 MMHG | BODY MASS INDEX: 30.88 KG/M2 | HEIGHT: 63 IN | OXYGEN SATURATION: 99 % | TEMPERATURE: 100.1 F

## 2018-01-23 NOTE — MISCELLANEOUS
Message  Return to work or school:   Reshma Carrillo is under my professional care  She was seen in my office on 1/17/18   She is able to return to work on  1/19/18       Dayo Segovia PA-C        Signatures   Electronically signed by : SARAI Castaneda; Jan 17 2018 11:54AM EST                       (Author)

## 2018-01-29 ENCOUNTER — OFFICE VISIT (OUTPATIENT)
Dept: INTERNAL MEDICINE CLINIC | Age: 48
End: 2018-01-29
Payer: COMMERCIAL

## 2018-01-29 DIAGNOSIS — Z23 NEED FOR INFLUENZA VACCINATION: Primary | ICD-10-CM

## 2018-01-29 PROCEDURE — 90656 IIV3 VACC NO PRSV 0.5 ML IM: CPT | Performed by: INTERNAL MEDICINE

## 2018-01-29 PROCEDURE — 90686 IIV4 VACC NO PRSV 0.5 ML IM: CPT | Performed by: FAMILY MEDICINE

## 2018-01-29 PROCEDURE — 90471 IMMUNIZATION ADMIN: CPT | Performed by: INTERNAL MEDICINE

## 2018-02-09 DIAGNOSIS — F41.9 ANXIETY: Primary | ICD-10-CM

## 2018-02-09 RX ORDER — ALPRAZOLAM 0.5 MG/1
0.5 TABLET ORAL
Qty: 30 TABLET | Refills: 0 | Status: SHIPPED | OUTPATIENT
Start: 2018-02-09 | End: 2018-03-09 | Stop reason: SDUPTHER

## 2018-02-09 NOTE — TELEPHONE ENCOUNTER
Message left on [phone] re: prescription is in the [facility] office ready for

## 2018-02-13 ENCOUNTER — OFFICE VISIT (OUTPATIENT)
Dept: INTERNAL MEDICINE CLINIC | Age: 48
End: 2018-02-13
Payer: COMMERCIAL

## 2018-02-13 VITALS
TEMPERATURE: 98.8 F | WEIGHT: 179.8 LBS | BODY MASS INDEX: 33.09 KG/M2 | SYSTOLIC BLOOD PRESSURE: 144 MMHG | HEIGHT: 62 IN | DIASTOLIC BLOOD PRESSURE: 98 MMHG | OXYGEN SATURATION: 98 % | HEART RATE: 86 BPM

## 2018-02-13 DIAGNOSIS — N93.9 ABNORMAL UTERINE BLEEDING UNRELATED TO MENSTRUAL CYCLE: Primary | ICD-10-CM

## 2018-02-13 DIAGNOSIS — I10 BENIGN ESSENTIAL HYPERTENSION: ICD-10-CM

## 2018-02-13 DIAGNOSIS — R63.5 WEIGHT GAIN: ICD-10-CM

## 2018-02-13 DIAGNOSIS — E55.9 VITAMIN D DEFICIENCY: ICD-10-CM

## 2018-02-13 DIAGNOSIS — E78.1 ESSENTIAL HYPERTRIGLYCERIDEMIA: ICD-10-CM

## 2018-02-13 DIAGNOSIS — F41.1 GENERALIZED ANXIETY DISORDER: ICD-10-CM

## 2018-02-13 PROCEDURE — 99214 OFFICE O/P EST MOD 30 MIN: CPT | Performed by: FAMILY MEDICINE

## 2018-02-13 RX ORDER — ACYCLOVIR 50 MG/G
OINTMENT TOPICAL AS NEEDED
COMMUNITY
End: 2018-06-19 | Stop reason: CLARIF

## 2018-02-13 RX ORDER — NORETHINDRONE 0.35 MG/1
1 TABLET ORAL DAILY
COMMUNITY
Start: 2018-02-10 | End: 2019-01-16

## 2018-02-13 RX ORDER — FENOFIBRATE 48 MG/1
48 TABLET, COATED ORAL DAILY
Refills: 5 | COMMUNITY
Start: 2018-02-07 | End: 2018-05-11 | Stop reason: SDUPTHER

## 2018-02-13 RX ORDER — ERGOCALCIFEROL 1.25 MG/1
50000 CAPSULE ORAL WEEKLY
Refills: 3 | COMMUNITY
Start: 2018-01-30 | End: 2018-09-10 | Stop reason: SDUPTHER

## 2018-02-13 RX ORDER — IBUPROFEN 200 MG
200-800 TABLET ORAL EVERY 6 HOURS PRN
COMMUNITY

## 2018-02-13 RX ORDER — FLUTICASONE PROPIONATE 50 MCG
1 SPRAY, SUSPENSION (ML) NASAL AS NEEDED
COMMUNITY
End: 2019-08-19 | Stop reason: SDUPTHER

## 2018-02-13 NOTE — PROGRESS NOTES
Assessment/Plan:    1  Benign essential hypertension (401 1) (I10)   2  LATOSHA (obstructive sleep apnea) (327 23) (G47 33)   3  Vitamin D deficiency (268 9) (E55 9)   4  Generalized anxiety disorder (300 02) (F41 1)   5  Essential hypertriglyceridemia (272 1) (E78 1)     Plan  Benign essential hypertension    ·  Lisinopril 20 MG Oral Tablet; TAKE 1 TABLET DAILY AS DIRECTED   ·  Metoprolol Succinate ER 50 MG Oral Tablet Extended Release 24 Hour; TAKE 1 TABLET  DAILY   · (1) COMPREHENSIVE METABOLIC PANEL;   Essential hypertriglyceridemia    ·  Fenofibrate 48 MG Oral Tablet; TAKE 1 TABLET DAILY    · (1) LIPID PANEL FASTING W DIRECT LDL REFLEX;    · Follow-up visit in 6 months Evaluation and Treatment  Follow-up  Vitamin D deficiency    · : Vitamin D (Ergocalciferol) 72651 UNIT Oral Capsule; TAKE 1 CAPSULE WEEKLY     Discussion/Summary  Discussion Summary:   GREAT JOB WITH YOUR CHOLESTEROL !!! labs reviewed  check BP at home  use oral  for new dx of LATOSHA  cholesterol in 6 months  discussed nutrition  Counseling Documentation With Imm: The patient was counseled regarding diagnostic results  Problem List Items Addressed This Visit        Cardiovascular and Mediastinum    Benign essential hypertension       Genitourinary    Abnormal uterine bleeding unrelated to menstrual cycle - Primary       Other    Weight gain     Start meds, cont with nutrition changes, discussed exercise         Essential hypertriglyceridemia     Improved and cont with present care,  repeat labs in 4 months, greatly improved  Relevant Medications    fenofibrate (TRICOR) 48 mg tablet    Generalized anxiety disorder     Slightly worse with DUB and biopsy, results are negative         Vitamin D deficiency     Cont with vit d and check labs                 Subjective:      Patient ID: Michael Gann is a 52 y o  female  HPI       History of Present Illness  Hypertension (Follow-Up):  The patient presents for follow-up of essential hypertension  The patient states she has been doing well with her blood pressure control since the last visit  She has no comorbid illnesses  Interval Events: home readings are very well controlled  she has them today to review  taking her meds  feels well  Symptoms: denies impaired vision,-- denies dyspnea,-- denies chest pain,-- denies intermittent leg claudication-- and-- denies lower extremity edema  Associated symptoms include no headache,-- no focal neurologic deficits-- and-- no memory loss  Home monitoring: The patient checks her blood pressure sporadically  Blood pressure control has been good  Lifestyle: Diet: She consumes a diverse and healthy diet  Weight Issues: She has weight concerns  Weight control issues: overweight  Smoking: The patient has never smoked cigarettes  Medications: the patient is adherent with her medication regimen  -- She denies medication side effects  Disease Management: the patient is not doing well with her blood pressure goals  The patient is due for an eye exam      Anxiety Disorder (Follow-Up): The patient is being seen for follow-up of anxiety  The patient reports doing poorly  She has no comorbid illnesses  Uses xanax at night to help sleep  does not take in the daytime  11/8/16: got a new job, friend recently passed  daughter left for college  father is not speaking to her  12/20/16 never started lexapro since she had in the past and had sexual side effects   Interval symptoms:  worsened anxiety,-- worsened difficulty concentrating,-- worsened restlessness,-- worsened panic attacks,-- worsened sleep disruption-- and-- worsened depression  Associated symptoms: no grandiosity,-- no racing thoughts,-- no periods of euphoria,-- no hallucinations-- and-- no suicidal ideation  Medications:  the patient is adherent to her medication regimen, but-- she denies medication side effects     Disease management:  the patient is not doing well with her goals  Additional history: was tapering off xanax but now taking regularly   Hyperlipidemia (Follow-Up): The patient states her hyperlipidemia has been under good control since the last visit  Comorbid Illnesses: hypertension  Interval Events: had labs done, on fenofibrate  Symptoms:   Medications: the patient is adherent with her medication regimen  The patient is not doing well with her hyperlipidemia goals              The following portions of the patient's history were reviewed and updated as appropriate: allergies, current medications, past medical history and problem list     Review of Systems    Constitutional:  Denies fever or chills , weight gain 5 lb  Eyes:  Denies change in visual acuity   HENT:  Denies nasal congestion or sore throat   Respiratory:  Denies cough or shortness of breath or wheezing  Cardiovascular:  Denies palpitations or chest pain  GI:  Denies abdominal pain, nausea, or vomiting  Integument:  Denies rash   Neurologic:  Denies headache or focal weakness      Objective:    Vitals:    02/13/18 1530   BP: 144/98   Pulse: 86   Temp: 98 8 °F (37 1 °C)   SpO2: 98%     Vitals:    02/13/18 1530   BP: 144/98   Pulse: 86   Temp: 98 8 °F (37 1 °C)   SpO2: 98%        Physical Exam    Constitutional:  Well developed, well nourished, no acute distress, non-toxic appearance   Eyes:  PERRL, conjunctiva normal , non icteric sclera  HENT:  Atraumatic, oropharynx moist  Neck-  supple   Respiratory:  CTA b/l, normal breath sounds, no rales, no wheezing   Cardiovascular:  RRR, no murmurs, no LE edema b/l  GI:  Soft, nondistended, normal bowel sounds x 4, nontender, no organomegaly, no mass, no rebound, no guarding   Neurologic:  no focal deficits noted   Psychiatric:  Speech and behavior appropriate , AAO x 3

## 2018-03-09 DIAGNOSIS — F41.9 ANXIETY: ICD-10-CM

## 2018-03-09 RX ORDER — ALPRAZOLAM 0.5 MG/1
0.5 TABLET ORAL
Qty: 30 TABLET | Refills: 0 | Status: SHIPPED | OUTPATIENT
Start: 2018-03-09 | End: 2018-04-10 | Stop reason: SDUPTHER

## 2018-03-16 DIAGNOSIS — I10 HYPERTENSION, UNSPECIFIED TYPE: Primary | ICD-10-CM

## 2018-03-16 RX ORDER — METOPROLOL SUCCINATE 50 MG/1
50 TABLET, EXTENDED RELEASE ORAL DAILY
Qty: 90 TABLET | Refills: 1 | Status: SHIPPED | OUTPATIENT
Start: 2018-03-16 | End: 2018-09-17 | Stop reason: SDUPTHER

## 2018-04-10 DIAGNOSIS — F41.9 ANXIETY: ICD-10-CM

## 2018-04-10 RX ORDER — ALPRAZOLAM 0.5 MG/1
0.5 TABLET ORAL
Qty: 30 TABLET | Refills: 0 | Status: SHIPPED | OUTPATIENT
Start: 2018-04-10 | End: 2018-05-08 | Stop reason: SDUPTHER

## 2018-04-24 ENCOUNTER — TELEPHONE (OUTPATIENT)
Dept: INTERNAL MEDICINE CLINIC | Age: 48
End: 2018-04-24

## 2018-04-24 DIAGNOSIS — I10 ESSENTIAL HYPERTENSION: Primary | ICD-10-CM

## 2018-04-24 RX ORDER — AMLODIPINE BESYLATE 10 MG/1
10 TABLET ORAL DAILY
Qty: 90 TABLET | Refills: 1 | Status: SHIPPED | OUTPATIENT
Start: 2018-04-24 | End: 2018-10-23 | Stop reason: SDUPTHER

## 2018-04-27 ENCOUNTER — OFFICE VISIT (OUTPATIENT)
Dept: INTERNAL MEDICINE CLINIC | Age: 48
End: 2018-04-27
Payer: COMMERCIAL

## 2018-04-27 VITALS
BODY MASS INDEX: 33.27 KG/M2 | OXYGEN SATURATION: 99 % | HEART RATE: 70 BPM | HEIGHT: 63 IN | WEIGHT: 187.8 LBS | DIASTOLIC BLOOD PRESSURE: 92 MMHG | SYSTOLIC BLOOD PRESSURE: 138 MMHG | TEMPERATURE: 99.2 F

## 2018-04-27 DIAGNOSIS — J02.9 SORE THROAT: Primary | ICD-10-CM

## 2018-04-27 LAB — S PYO AG THROAT QL: NEGATIVE

## 2018-04-27 PROCEDURE — 87880 STREP A ASSAY W/OPTIC: CPT | Performed by: NURSE PRACTITIONER

## 2018-04-27 PROCEDURE — 99213 OFFICE O/P EST LOW 20 MIN: CPT | Performed by: NURSE PRACTITIONER

## 2018-04-27 PROCEDURE — 3008F BODY MASS INDEX DOCD: CPT | Performed by: NURSE PRACTITIONER

## 2018-04-27 NOTE — PROGRESS NOTES
Assessment/Plan:    Sore throat   Rapid strep negative in office today  However due to her recent contact with strep throat will send out culture  Her sore throat appears to be viral in nature at this time  Advised her to do warm salt water rinses and continue with Advil for pain  Advised patient to come back to the office if her symptoms worsen or she develops a fever above 101  Also advised patient to take Flonase 2 sprays each nostril daily to prevent postnasal drip  Diagnoses and all orders for this visit:    Sore throat  -     Streptococcus, Group A Culture; Future  -     POCT rapid strepA          Subjective:      Patient ID: Clementina Love is a 52 y o  female  Patient presents today with sore throat,  She states that her pain in her throat started yesterday  Of note patient had strep throat in January and was treated with penicillin  She is currently taking Advil and this seems to be helping a little  Patient is a  and has been in contact with children have had strep throat  Sore Throat    This is a new problem  The current episode started yesterday  The problem has been waxing and waning  Sore throat worse side: both sides  There has been no fever  The pain is at a severity of 2/10  The pain is mild  Pertinent negatives include no abdominal pain, congestion, coughing, diarrhea, ear discharge, ear pain, headaches, hoarse voice, plugged ear sensation, shortness of breath, trouble swallowing or vomiting  She has had exposure to strep  Treatments tried: Advil  The treatment provided mild relief  The following portions of the patient's history were reviewed and updated as appropriate: allergies, current medications, past family history, past medical history, past social history, past surgical history and problem list     Review of Systems   Constitutional: Negative for appetite change, chills, fatigue and fever  HENT: Positive for postnasal drip and sore throat  Negative for congestion, ear discharge, ear pain, hoarse voice, rhinorrhea, sinus pain, sinus pressure and trouble swallowing  Eyes: Negative for pain and redness  Respiratory: Negative for cough, chest tightness, shortness of breath and wheezing  Cardiovascular: Negative for chest pain and palpitations  Gastrointestinal: Negative for abdominal pain, constipation, diarrhea, nausea and vomiting  Genitourinary: Negative for dysuria, hematuria and urgency  Allergic/Immunologic: Positive for environmental allergies  Neurological: Negative for dizziness, numbness and headaches           Past Medical History:   Diagnosis Date    Acute cystitis with hematuria     Cellulitis of scalp     Dehydration     Fatigue     Hx of chest pain     Hx of hematuria     Hypertension     Microscopic hematuria     SOB (shortness of breath)     Streptococcal pharyngitis     Urinary frequency          Current Outpatient Prescriptions:     acyclovir (ZOVIRAX) 5 % ointment, Apply topically as needed, Disp: , Rfl:     ALPRAZolam (XANAX) 0 5 mg tablet, Take 1 tablet (0 5 mg total) by mouth daily at bedtime as needed for anxiety, Disp: 30 tablet, Rfl: 0    amLODIPine (NORVASC) 10 mg tablet, Take 1 tablet (10 mg total) by mouth daily, Disp: 90 tablet, Rfl: 1    TORSTEN 0 35 MG tablet, Take 1 tablet by mouth daily, Disp: , Rfl:     ergocalciferol (VITAMIN D2) 50,000 units, 50,000 Units once a week, Disp: , Rfl: 3    fenofibrate (TRICOR) 48 mg tablet, Take 48 mg by mouth daily, Disp: , Rfl: 5    fluticasone (FLONASE) 50 mcg/act nasal spray, 1 spray into each nostril as needed, Disp: , Rfl:     ibuprofen (MOTRIN) 200 mg tablet, Take 200 mg by mouth every 6 (six) hours, Disp: , Rfl:     LISINOPRIL PO, Take 20 mg by mouth daily  , Disp: , Rfl:     metoprolol succinate (TOPROL-XL) 50 mg 24 hr tablet, Take 1 tablet (50 mg total) by mouth daily, Disp: 90 tablet, Rfl: 1    Multiple Vitamin (MULTI-VITAMIN DAILY PO), Take 1 tablet by mouth daily, Disp: , Rfl:     Allergies   Allergen Reactions    Levofloxacin Rash    Other      Seasonal        Social History   Past Surgical History:   Procedure Laterality Date    BREAST SURGERY       SECTION      x3    TUBAL LIGATION       Family History   Problem Relation Age of Onset    Hypertension Father     Hyperlipidemia Father        Objective:  /92 (BP Location: Left arm, Patient Position: Sitting, Cuff Size: Standard)   Pulse 70   Temp 99 2 °F (37 3 °C) (Tympanic)   Ht 5' 2 84" (1 596 m)   Wt 85 2 kg (187 lb 12 8 oz)   SpO2 99%   BMI 33 44 kg/m²     No results found for this or any previous visit (from the past 1344 hour(s))  Physical Exam   Constitutional: She is oriented to person, place, and time  She appears well-developed and well-nourished  No distress  HENT:   Head: Normocephalic and atraumatic  Right Ear: External ear and ear canal normal    Left Ear: External ear and ear canal normal    Nose: Nose normal  No mucosal edema or rhinorrhea  Right sinus exhibits no maxillary sinus tenderness and no frontal sinus tenderness  Left sinus exhibits no maxillary sinus tenderness and no frontal sinus tenderness  Mouth/Throat: Posterior oropharyngeal erythema present  No oropharyngeal exudate  Fluid noted behind patient's ears  Does not appear to be bacterial in nature,  Minimal amount of postnasal drip noted to patient's pharynx,  No exudate noted to patient's pharynx   Eyes: Conjunctivae and EOM are normal  Pupils are equal, round, and reactive to light  Right eye exhibits no discharge  Left eye exhibits no discharge  Neck: Normal range of motion  Neck supple  No thyromegaly present  Cardiovascular: Normal rate, regular rhythm, normal heart sounds and intact distal pulses  Exam reveals no gallop and no friction rub  No murmur heard  Pulmonary/Chest: Effort normal and breath sounds normal  No stridor  No respiratory distress   She has no wheezes  She has no rales  Abdominal: Soft  Bowel sounds are normal  She exhibits no distension  There is no tenderness  Lymphadenopathy:     She has no cervical adenopathy  Neurological: She is alert and oriented to person, place, and time  Skin: Skin is warm and dry  No rash noted  She is not diaphoretic  No erythema  Psychiatric: She has a normal mood and affect   Her behavior is normal  Judgment and thought content normal

## 2018-04-27 NOTE — PATIENT INSTRUCTIONS
Illness appears to be viral in nature  Rest and fluids advised  Educated that the course of this illness could be 2-4 weeks  Discussed symptomatic relief, such as warm steam inhalations, tylenol/ibuprofen for fevers and body aches, rest, and drink plenty of fluids  Warm salt gargles for sore throat  Discussed red flag signs to go to the ER, such as chest pain or shortness of breath     Return to the office for reevaluation if symptoms worsen or do not improve in 1-2 weeks

## 2018-04-27 NOTE — ASSESSMENT & PLAN NOTE
Rapid strep negative in office today  However due to her recent contact with strep throat will send out culture  Her sore throat appears to be viral in nature at this time  Advised her to do warm salt water rinses and continue with Advil for pain  Advised patient to come back to the office if her symptoms worsen or she develops a fever above 101  Also advised patient to take Flonase 2 sprays each nostril daily to prevent postnasal drip

## 2018-05-08 DIAGNOSIS — F41.9 ANXIETY: ICD-10-CM

## 2018-05-08 RX ORDER — ALPRAZOLAM 0.5 MG/1
0.5 TABLET ORAL
Qty: 30 TABLET | Refills: 0 | Status: SHIPPED | OUTPATIENT
Start: 2018-05-08 | End: 2018-06-08 | Stop reason: SDUPTHER

## 2018-05-08 NOTE — TELEPHONE ENCOUNTER
Last office visit 2/13/18  Next office visit scheduled on 8/7/18  Anxiety last addressed 2/13/18  PDMP website checked  Patient is requesting a 2 month supply  If this is approved, the prescription will need to be adjusted

## 2018-05-11 DIAGNOSIS — E78.00 HYPERCHOLESTEREMIA: Primary | ICD-10-CM

## 2018-05-11 RX ORDER — FENOFIBRATE 48 MG/1
48 TABLET, COATED ORAL DAILY
Qty: 90 TABLET | Refills: 1 | Status: SHIPPED | OUTPATIENT
Start: 2018-05-11 | End: 2018-11-07 | Stop reason: SDUPTHER

## 2018-05-21 DIAGNOSIS — I10 HYPERTENSION, UNSPECIFIED TYPE: Primary | ICD-10-CM

## 2018-05-21 RX ORDER — LISINOPRIL 20 MG/1
20 TABLET ORAL DAILY
Qty: 90 TABLET | Refills: 1 | Status: SHIPPED | OUTPATIENT
Start: 2018-05-21 | End: 2018-11-20 | Stop reason: SDUPTHER

## 2018-06-08 DIAGNOSIS — F41.9 ANXIETY: ICD-10-CM

## 2018-06-08 RX ORDER — ALPRAZOLAM 0.5 MG/1
0.5 TABLET ORAL
Qty: 30 TABLET | Refills: 0 | Status: SHIPPED | OUTPATIENT
Start: 2018-06-08 | End: 2018-07-09 | Stop reason: SDUPTHER

## 2018-06-19 DIAGNOSIS — B00.1 RECURRENT COLD SORES: Primary | ICD-10-CM

## 2018-06-19 RX ORDER — DOCOSANOL 100 MG/G
CREAM TOPICAL 4 TIMES DAILY PRN
Qty: 2 G | Refills: 0 | Status: SHIPPED | OUTPATIENT
Start: 2018-06-19 | End: 2019-08-19 | Stop reason: ALTCHOICE

## 2018-06-19 NOTE — TELEPHONE ENCOUNTER
Pt needs refill of Acyclovir, but med is no longer on formulary  Formulary alternative covered id [email or web address]       Last appt, 2/13/18    Next appt, 8/7/18 Additional Notes: Start uvb narrow band treatments tomorrow . Start at 45secs

## 2018-07-09 DIAGNOSIS — F41.9 ANXIETY: ICD-10-CM

## 2018-07-10 RX ORDER — ALPRAZOLAM 0.5 MG/1
0.5 TABLET ORAL
Qty: 30 TABLET | Refills: 0 | Status: SHIPPED | OUTPATIENT
Start: 2018-07-10 | End: 2018-08-07 | Stop reason: SDUPTHER

## 2018-08-07 ENCOUNTER — OFFICE VISIT (OUTPATIENT)
Dept: INTERNAL MEDICINE CLINIC | Age: 48
End: 2018-08-07
Payer: COMMERCIAL

## 2018-08-07 VITALS
OXYGEN SATURATION: 98 % | DIASTOLIC BLOOD PRESSURE: 89 MMHG | SYSTOLIC BLOOD PRESSURE: 128 MMHG | HEART RATE: 90 BPM | WEIGHT: 184.4 LBS | HEIGHT: 62 IN | TEMPERATURE: 98.5 F | BODY MASS INDEX: 33.93 KG/M2

## 2018-08-07 DIAGNOSIS — F41.1 GENERALIZED ANXIETY DISORDER: ICD-10-CM

## 2018-08-07 DIAGNOSIS — E78.1 ESSENTIAL HYPERTRIGLYCERIDEMIA: Primary | ICD-10-CM

## 2018-08-07 DIAGNOSIS — I10 BENIGN ESSENTIAL HYPERTENSION: ICD-10-CM

## 2018-08-07 DIAGNOSIS — F41.9 ANXIETY: ICD-10-CM

## 2018-08-07 DIAGNOSIS — E55.9 VITAMIN D DEFICIENCY: ICD-10-CM

## 2018-08-07 DIAGNOSIS — J02.9 SORE THROAT: ICD-10-CM

## 2018-08-07 PROCEDURE — 99214 OFFICE O/P EST MOD 30 MIN: CPT | Performed by: FAMILY MEDICINE

## 2018-08-07 PROCEDURE — 1036F TOBACCO NON-USER: CPT | Performed by: FAMILY MEDICINE

## 2018-08-07 PROCEDURE — 3079F DIAST BP 80-89 MM HG: CPT | Performed by: FAMILY MEDICINE

## 2018-08-07 PROCEDURE — 3074F SYST BP LT 130 MM HG: CPT | Performed by: FAMILY MEDICINE

## 2018-08-07 RX ORDER — ALPRAZOLAM 0.5 MG/1
0.5 TABLET ORAL
Qty: 30 TABLET | Refills: 5 | Status: SHIPPED | OUTPATIENT
Start: 2018-08-07 | End: 2019-02-06 | Stop reason: SDUPTHER

## 2018-08-07 NOTE — PROGRESS NOTES
Assessment/Plan:    No problem-specific Assessment & Plan notes found for this encounter  1  Benign essential hypertension (401 1) (I10)   2  LATOSHA (obstructive sleep apnea) (327 23) (G47 33)   3  Vitamin D deficiency (268 9) (E55 9)   4  Generalized anxiety disorder (300 02) (F41 1)   5  Essential hypertriglyceridemia (272 1) (E78 1)   Discussion Summary:   GREAT JOB WITH YOUR CHOLESTEROL !!! labs reviewed  check BP at home  use oral  for new dx of LATOSHA  cholesterol in 6 months  discussed nutrition  Problem List Items Addressed This Visit        Cardiovascular and Mediastinum    Benign essential hypertension    Relevant Orders    CBC and differential    Comprehensive metabolic panel       Other    Essential hypertriglyceridemia - Primary    Relevant Orders    Lipid panel    CK    Generalized anxiety disorder    Relevant Medications    ALPRAZolam (XANAX) 0 5 mg tablet    Vitamin D deficiency    Relevant Orders    Vitamin D 25 hydroxy    Sore throat      Other Visit Diagnoses     Anxiety        Relevant Medications    ALPRAZolam (XANAX) 0 5 mg tablet            Subjective:  F/up htn     Patient ID: Karla Carty is a 50 y o  female  HPI  Hypertension (Follow-Up): The patient presents for follow-up of essential hypertension  The patient states she has been doing well with her blood pressure control since the last visit  She has no comorbid illnesses  Interval Events: home readings are very well controlled  she has them today to review  taking her meds  feels well  Symptoms: denies impaired vision,-- denies dyspnea,-- denies chest pain,-- denies intermittent leg claudication-- and-- denies lower extremity edema  Associated symptoms include no headache,-- no focal neurologic deficits-- and-- no memory loss  Home monitoring: The patient checks her blood pressure sporadically  Blood pressure control has been good  Lifestyle: Diet: She consumes a diverse and healthy diet  Weight Issues: She has weight concerns  Weight control issues: overweight  Smoking: The patient has never smoked cigarettes  Medications: the patient is adherent with her medication regimen  -- She denies medication side effects  Disease Management: the patient is not doing well with her blood pressure goals  The patient is due for an eye exam       Anxiety Disorder (Follow-Up): The patient is being seen for follow-up of anxiety  The patient reports doing poorly  She has no comorbid illnesses  Uses xanax at night to help sleep  does not take in the daytime  11/8/16: got a new job, friend recently passed  daughter left for college  father is not speaking to her  12/20/16 never started lexapro since she had in the past and had sexual side effects   Interval symptoms:  worsened anxiety,-- worsened difficulty concentrating,-- worsened restlessness,-- worsened panic attacks,-- worsened sleep disruption-- and-- worsened depression  Associated symptoms: no grandiosity,-- no racing thoughts,-- no periods of euphoria,-- no hallucinations-- and-- no suicidal ideation  Medications:  the patient is adherent to her medication regimen, but-- she denies medication side effects  Disease management:  the patient is not doing well with her goals  Additional history: was tapering off xanax but now taking regularly at night       Hyperlipidemia (Follow-Up): The patient states her hyperlipidemia has been under good control since the last visit  Comorbid Illnesses: hypertension  Interval Events:  No labs, on fenofibrate  Symptoms:   Medications: the patient is adherent with her medication regimen   The patient is not doing well with her hyperlipidemia goals         The following portions of the patient's history were reviewed and updated as appropriate: allergies, current medications, past family history, past medical history, past social history, past surgical history and problem list     Review of Systems      Constitutional:  Denies fever or chills   Eyes:  Denies change in visual acuity   HENT:  Denies nasal congestion or sore throat   Respiratory:  Denies cough or shortness of breath or wheezing  Cardiovascular:  Denies palpitations or chest pain  GI:  Denies abdominal pain, nausea, or vomiting  Integument:  Denies rash   Neurologic:  Denies headache or focal weakness        Objective:      /89 (BP Location: Left arm, Patient Position: Sitting, Cuff Size: Adult)   Pulse 90   Temp 98 5 °F (36 9 °C) (Tympanic)   Ht 5' 2 4" (1 585 m)   Wt 83 6 kg (184 lb 6 4 oz)   SpO2 98%   BMI 33 29 kg/m²          Physical Exam    Constitutional:  Well developed, well nourished, no acute distress, non-toxic appearance   Eyes:  PERRL, conjunctiva normal , non icteric sclera  HENT:  Atraumatic, oropharynx moist  Neck-  supple   Respiratory:  CTA b/l, normal breath sounds, no rales, no wheezing   Cardiovascular:  RRR, no murmurs, no LE edema b/l  GI:  Soft, nondistended, normal bowel sounds x 4, nontender, no organomegaly, no mass, no rebound, no guarding   Neurologic:  no focal deficits noted   Psychiatric:  Speech and behavior appropriate , AAO x 3

## 2018-08-10 ENCOUNTER — TELEPHONE (OUTPATIENT)
Dept: INTERNAL MEDICINE CLINIC | Age: 48
End: 2018-08-10

## 2018-08-10 NOTE — TELEPHONE ENCOUNTER
Patient is calling regarding her blood work that she just had done on 08/07/2018    Patient wants to speak with you regarding the abnormal triglyceride level that came back    Please call her back at the mobile phone

## 2018-08-24 ENCOUNTER — CLINICAL SUPPORT (OUTPATIENT)
Dept: INTERNAL MEDICINE CLINIC | Age: 48
End: 2018-08-24
Payer: COMMERCIAL

## 2018-08-24 DIAGNOSIS — Z11.1 PPD SCREENING TEST: Primary | ICD-10-CM

## 2018-08-24 PROCEDURE — 86580 TB INTRADERMAL TEST: CPT

## 2018-08-27 ENCOUNTER — CLINICAL SUPPORT (OUTPATIENT)
Dept: INTERNAL MEDICINE CLINIC | Age: 48
End: 2018-08-27

## 2018-08-27 DIAGNOSIS — Z11.1 ENCOUNTER FOR PPD SKIN TEST READING: Primary | ICD-10-CM

## 2018-08-27 LAB
INDURATION: NORMAL MM
TB SKIN TEST: NEGATIVE

## 2018-09-08 DIAGNOSIS — I10 HYPERTENSION, UNSPECIFIED TYPE: ICD-10-CM

## 2018-09-10 DIAGNOSIS — E55.9 VITAMIN D DEFICIENCY: Primary | ICD-10-CM

## 2018-09-10 RX ORDER — ERGOCALCIFEROL 1.25 MG/1
50000 CAPSULE ORAL WEEKLY
Qty: 12 CAPSULE | Refills: 1 | Status: SHIPPED | OUTPATIENT
Start: 2018-09-10 | End: 2019-05-29 | Stop reason: SDUPTHER

## 2018-09-10 RX ORDER — METOPROLOL SUCCINATE 50 MG/1
50 TABLET, EXTENDED RELEASE ORAL DAILY
Qty: 90 TABLET | Refills: 1 | OUTPATIENT
Start: 2018-09-10

## 2018-09-10 NOTE — TELEPHONE ENCOUNTER
Pt called requesting refill on vitamin D 50,000u     Medication is not on pts current medication list     Vit D last checked 8/7/18 and was at 60    Please advise on medication refill?

## 2018-09-17 DIAGNOSIS — I10 HYPERTENSION, UNSPECIFIED TYPE: ICD-10-CM

## 2018-09-17 RX ORDER — CHOLECALCIFEROL (VITAMIN D3) 1250 MCG
1 CAPSULE ORAL WEEKLY
Qty: 12 CAPSULE | Refills: 0 | Status: SHIPPED | OUTPATIENT
Start: 2018-09-17 | End: 2019-01-16

## 2018-09-17 RX ORDER — METOPROLOL SUCCINATE 50 MG/1
50 TABLET, EXTENDED RELEASE ORAL DAILY
Qty: 90 TABLET | Refills: 1 | Status: SHIPPED | OUTPATIENT
Start: 2018-09-17 | End: 2019-03-12 | Stop reason: SDUPTHER

## 2018-10-16 DIAGNOSIS — I10 ESSENTIAL HYPERTENSION: ICD-10-CM

## 2018-10-17 RX ORDER — AMLODIPINE BESYLATE 10 MG/1
10 TABLET ORAL DAILY
Qty: 90 TABLET | Refills: 1 | OUTPATIENT
Start: 2018-10-17

## 2018-10-23 DIAGNOSIS — I10 ESSENTIAL HYPERTENSION: ICD-10-CM

## 2018-10-23 RX ORDER — AMLODIPINE BESYLATE 10 MG/1
10 TABLET ORAL DAILY
Qty: 90 TABLET | Refills: 1 | Status: SHIPPED | OUTPATIENT
Start: 2018-10-23 | End: 2019-04-19 | Stop reason: SDUPTHER

## 2018-11-03 DIAGNOSIS — E78.00 HYPERCHOLESTEREMIA: ICD-10-CM

## 2018-11-04 RX ORDER — FENOFIBRATE 48 MG/1
48 TABLET, COATED ORAL DAILY
Qty: 90 TABLET | Refills: 1 | OUTPATIENT
Start: 2018-11-04

## 2018-11-07 DIAGNOSIS — E78.00 HYPERCHOLESTEREMIA: ICD-10-CM

## 2018-11-07 RX ORDER — FENOFIBRATE 48 MG/1
48 TABLET, COATED ORAL DAILY
Qty: 90 TABLET | Refills: 0 | Status: SHIPPED | OUTPATIENT
Start: 2018-11-07 | End: 2019-02-05 | Stop reason: SDUPTHER

## 2018-11-15 DIAGNOSIS — I10 HYPERTENSION, UNSPECIFIED TYPE: ICD-10-CM

## 2018-11-18 RX ORDER — LISINOPRIL 20 MG/1
TABLET ORAL
Qty: 90 TABLET | Refills: 1 | OUTPATIENT
Start: 2018-11-18

## 2018-11-20 DIAGNOSIS — I10 HYPERTENSION, UNSPECIFIED TYPE: ICD-10-CM

## 2018-11-20 RX ORDER — LISINOPRIL 20 MG/1
20 TABLET ORAL DAILY
Qty: 90 TABLET | Refills: 1 | Status: SHIPPED | OUTPATIENT
Start: 2018-11-20 | End: 2019-04-19 | Stop reason: SDUPTHER

## 2018-12-08 DIAGNOSIS — E55.9 VITAMIN D DEFICIENCY: ICD-10-CM

## 2018-12-10 RX ORDER — CHOLECALCIFEROL (VITAMIN D3) 1250 MCG
CAPSULE ORAL
Qty: 12 CAPSULE | Refills: 0 | OUTPATIENT
Start: 2018-12-10

## 2018-12-18 NOTE — TELEPHONE ENCOUNTER
Last OV 2/13/18  Next OV 8/7/18
Patient called stating she went to  amlodipine 10 mg qd at the [facility] and was told that there are no automatic refills and the patient must see her Md  She has been without the medication for two days  Please call the patient back before we leave today 
Refill sent to pharmacy  Please call patient and let her know  It is office policy to not refill medications that come from pharmacy  Patient's need to call for refills  Thanks!
Spoke to patient, she  understands
patient comes to ED via ambulance from home [place] speaking as per  #[id number] patient fell at home and now has right shoulder pain pt has hx of right shoulder dislocation

## 2018-12-19 ENCOUNTER — ANNUAL EXAM (OUTPATIENT)
Dept: GYNECOLOGY | Facility: CLINIC | Age: 48
End: 2018-12-19
Payer: COMMERCIAL

## 2018-12-19 VITALS
SYSTOLIC BLOOD PRESSURE: 142 MMHG | HEIGHT: 62 IN | WEIGHT: 185 LBS | BODY MASS INDEX: 34.04 KG/M2 | DIASTOLIC BLOOD PRESSURE: 102 MMHG

## 2018-12-19 DIAGNOSIS — N93.9 ABNORMAL UTERINE BLEEDING (AUB): Primary | ICD-10-CM

## 2018-12-19 PROCEDURE — 99202 OFFICE O/P NEW SF 15 MIN: CPT | Performed by: OBSTETRICS & GYNECOLOGY

## 2018-12-19 PROCEDURE — 96372 THER/PROPH/DIAG INJ SC/IM: CPT | Performed by: OBSTETRICS & GYNECOLOGY

## 2018-12-19 RX ORDER — MEDROXYPROGESTERONE ACETATE 150 MG/ML
150 INJECTION, SUSPENSION INTRAMUSCULAR ONCE
Status: COMPLETED | OUTPATIENT
Start: 2018-12-19 | End: 2018-12-19

## 2018-12-19 RX ADMIN — MEDROXYPROGESTERONE ACETATE 150 MG: 150 INJECTION, SUSPENSION INTRAMUSCULAR at 11:48

## 2018-12-19 NOTE — PROGRESS NOTES
PT  PRESENTS TODAY FOR HER DEPO INJECTION  GIVEN IN THE RD, LOT  [id number], EXP  07/2020  PT TOLERATED THE INJECTION WELL  WE PROVIDED THE MEDICATION

## 2018-12-19 NOTE — PROGRESS NOTES
Assessment/Plan:    Discussed AUB and options  Recommend RTO TVS-SIS possible biopsy as it has been over 1 year since last evaluated at [facility]  If normal optiosn discussed: alternative hormonal management vs surgical management ( ablation vs hysterectomy) Risks of each procedure discussed    Desires Depoprovera    Discussion 20 minutes    Abnormal uterine bleeding (AUB)        Subjective:      Patient ID: Ihor Oppenheim is a 50 y o  female  HPI  Over past 18 month c/o menometrorrhagia  TVS-SIS endometrial biopsy at [address] -benign  Trialed on Megace which did help for short term  Transitioned to jamir which has not helped  Prior tubal    The following portions of the patient's history were reviewed and updated as appropriate:   She  has a past medical history of Acute cystitis with hematuria; Cellulitis of scalp; Dehydration; Fatigue; chest pain; hematuria; Hypertension; Microscopic hematuria; No known health problems; SOB (shortness of breath); Streptococcal pharyngitis; and Urinary frequency  She   Patient Active Problem List    Diagnosis Date Noted    Sore throat 2018    Weight gain 2018    Vitamin D deficiency 2018    Abnormal uterine bleeding unrelated to menstrual cycle 2017    Essential hypertriglyceridemia 2015    Benign essential hypertension 02/10/2014    Generalized anxiety disorder 10/09/2013     She  has a past surgical history that includes  section; Breast surgery; Tubal ligation (Bilateral); and Reduction mammaplasty  C section x3  Her family history includes Hyperlipidemia in her father; Hypertension in her father; No Known Problems in her mother  She  reports that she has quit smoking  She has never used smokeless tobacco  She reports that she drinks alcohol  She reports that she does not use drugs    Current Outpatient Prescriptions   Medication Sig Dispense Refill    ALPRAZolam (XANAX) 0 5 mg tablet Take 1 tablet (0 5 mg total) by mouth daily at bedtime as needed for anxiety 30 tablet 5    amLODIPine (NORVASC) 10 mg tablet Take 1 tablet (10 mg total) by mouth daily 90 tablet 1    TORSTEN 0 35 MG tablet Take 1 tablet by mouth daily      Cholecalciferol (VITAMIN D3) 98380 units CAPS Take 1 capsule (50,000 Units total) by mouth once a week 12 capsule 0    docosanol (ABREVA) 10 % Apply topically 4 (four) times a day as needed (cold sores) 2 g 0    ergocalciferol (VITAMIN D2) 50,000 units Take 1 capsule (50,000 Units total) by mouth once a week 12 capsule 1    fenofibrate (TRICOR) 48 mg tablet Take 1 tablet (48 mg total) by mouth daily 90 tablet 0    fluticasone (FLONASE) 50 mcg/act nasal spray 1 spray into each nostril as needed      ibuprofen (MOTRIN) 200 mg tablet Take 200 mg by mouth every 6 (six) hours as needed        lisinopril (ZESTRIL) 20 mg tablet Take 1 tablet (20 mg total) by mouth daily 90 tablet 1    metoprolol succinate (TOPROL-XL) 50 mg 24 hr tablet Take 1 tablet (50 mg total) by mouth daily 90 tablet 1    Multiple Vitamin (MULTI-VITAMIN DAILY PO) Take 1 tablet by mouth daily      BIOTIN PO Take 2 each by mouth daily      norethindrone (AYGESTIN) 5 mg tablet Take 5 mg by mouth daily       No current facility-administered medications for this visit        Current Outpatient Prescriptions on File Prior to Visit   Medication Sig    ALPRAZolam (XANAX) 0 5 mg tablet Take 1 tablet (0 5 mg total) by mouth daily at bedtime as needed for anxiety    amLODIPine (NORVASC) 10 mg tablet Take 1 tablet (10 mg total) by mouth daily    TORSTEN 0 35 MG tablet Take 1 tablet by mouth daily    Cholecalciferol (VITAMIN D3) 59251 units CAPS Take 1 capsule (50,000 Units total) by mouth once a week    docosanol (ABREVA) 10 % Apply topically 4 (four) times a day as needed (cold sores)    ergocalciferol (VITAMIN D2) 50,000 units Take 1 capsule (50,000 Units total) by mouth once a week    fenofibrate (TRICOR) 48 mg tablet Take 1 tablet (48 mg total) by mouth daily    fluticasone (FLONASE) 50 mcg/act nasal spray 1 spray into each nostril as needed    ibuprofen (MOTRIN) 200 mg tablet Take 200 mg by mouth every 6 (six) hours as needed      lisinopril (ZESTRIL) 20 mg tablet Take 1 tablet (20 mg total) by mouth daily    metoprolol succinate (TOPROL-XL) 50 mg 24 hr tablet Take 1 tablet (50 mg total) by mouth daily    Multiple Vitamin (MULTI-VITAMIN DAILY PO) Take 1 tablet by mouth daily    BIOTIN PO Take 2 each by mouth daily    norethindrone (AYGESTIN) 5 mg tablet Take 5 mg by mouth daily     No current facility-administered medications on file prior to visit  She is allergic to levofloxacin and other       Review of Systems      Objective:      BP (!) 142/102   Ht 5' 1 75" (1 568 m)   Wt 83 9 kg (185 lb)   BMI 34 11 kg/m²          Physical Exam

## 2019-01-09 ENCOUNTER — OFFICE VISIT (OUTPATIENT)
Dept: GYNECOLOGY | Facility: CLINIC | Age: 49
End: 2019-01-09
Payer: COMMERCIAL

## 2019-01-09 ENCOUNTER — ULTRASOUND (OUTPATIENT)
Dept: GYNECOLOGY | Facility: CLINIC | Age: 49
End: 2019-01-09
Payer: COMMERCIAL

## 2019-01-09 DIAGNOSIS — N93.9 ABNORMAL UTERINE BLEEDING (AUB): Primary | ICD-10-CM

## 2019-01-09 DIAGNOSIS — N93.9 ABNORMAL UTERINE BLEEDING UNRELATED TO MENSTRUAL CYCLE: Primary | ICD-10-CM

## 2019-01-09 PROCEDURE — 99213 OFFICE O/P EST LOW 20 MIN: CPT | Performed by: NURSE PRACTITIONER

## 2019-01-09 PROCEDURE — 88305 TISSUE EXAM BY PATHOLOGIST: CPT | Performed by: PATHOLOGY

## 2019-01-09 PROCEDURE — 76831 ECHO EXAM UTERUS: CPT | Performed by: NURSE PRACTITIONER

## 2019-01-09 PROCEDURE — 76830 TRANSVAGINAL US NON-OB: CPT | Performed by: NURSE PRACTITIONER

## 2019-01-09 PROCEDURE — 58100 BIOPSY OF UTERUS LINING: CPT | Performed by: NURSE PRACTITIONER

## 2019-01-09 PROCEDURE — 58340 CATHETER FOR HYSTEROGRAPHY: CPT | Performed by: NURSE PRACTITIONER

## 2019-01-09 NOTE — PROGRESS NOTES
Assessment/Plan:    AUB     Diagnoses and all orders for this visit:    Abnormal uterine bleeding unrelated to menstrual cycle          Subjective: The pt  presents today for further evaluation of her abnormal bleeding  Patient ID: Rani Zuluaga is a 50 y o  female  HPI The pt  presnts today for a TVS/EMB/SIS to further evaluate her bleeding   She states that she has been experiencing irregular and heavy bleeding for about 1 1/2 yrs  She previously had a benign evaluation done at [facility] in 11/17  She did see Dr Armando Kong on 12/19 for evaluation and was told to RTO today  (see note of 12/19/18)  She did receive a Depo Provera injection that day and states that her bleeding did stop for about 5 days only  The following portions of the patient's history were reviewed and updated as appropriate: allergies, current medications, past medical history, past social history and problem list     Review of Systems   Constitutional: Negative  Genitourinary: Positive for menstrual problem and vaginal bleeding  Psychiatric/Behavioral: Negative  Objective: There were no vitals taken for this visit  Physical Exam   Genitourinary: There is no rash, tenderness or lesion on the right labia  There is no rash, tenderness or lesion on the left labia  There is bleeding in the vagina  Genitourinary Comments: Cervix: large polyp noted at os  This was removed using an allis and sent to pathology  see TVS/SIS    Endometrial biopsy  Date/Time: 1/9/2019 2:36 PM  Performed by: Shade Preciado  Authorized by: Shade Preciado     Consent:     Consent obtained:  Verbal and written    Consent given by:  Patient    Procedural risks discussed:  Bleeding, failure rate, infection and repeat procedure    Patient questions answered: yes      Patient agrees, verbalizes understanding, and wants to proceed: yes      Instructions and paperwork completed: yes    Indication:     Indications:  Other disorder of menstruation and other abnormal bleeding from female genital tract    Procedure: The cervix was dilated: no      Uterus sounded: yes      Uterus sound depth (cm):  7    Specimen collected: specimen collected and sent to pathology      Patient tolerated procedure well with no complications: yes    Comments:      Pt  was very anxious about the procedure  Step by step instructions were relayed to pt  Including during the removal of the cervical polyp  She is aware that the SIS did show a large endometrial polyp which will need to be removed and she is agreeable to this  The EMB results are pending

## 2019-01-09 NOTE — LETTER
January 9, 2019     Patient: Guille Mcintyre   YOB: 1970   Date of Visit: 1/9/2019       To Whom it May Concern:    Bailee Husbands is under my professional care  She was seen in my office on 1/9/2019  If you have any questions or concerns, please don't hesitate to call           Sincerely,          ALFREDO Lewis        CC: No Recipients

## 2019-01-09 NOTE — LETTER
January 9, 2019     No Recipients    Patient: Raghu Drummond   YOB: 1970   Date of Visit: 1/9/2019     Dear Dr Joe Rodriguez Recipients      Thank you for referring Juani Monge to me for evaluation  Below are the relevant portions of my assessment and plan of care  If you have questions, please do not hesitate to call me  I look forward to following Adrián Gonzalez along with you           Sincerely,        ALFREDO Connors        CC: No Recipients    Progress Notes:

## 2019-01-09 NOTE — PROGRESS NOTES
AMB US Pelvic Non OB  Date/Time: 1/9/2019 1:17 PM  Performed by: Nahomy Linder  Authorized by: Macy Anderson     Procedure details:     Indications: non-obstetric vaginal bleeding      Technique:  Transvaginal US, Non-OB    Position: lithotomy exam    Uterine findings:     Diameter (mm):  68    Length (mm):  117    Width (mm):  82    Endometrial stripe: identified      Endometrium thickness (mm):  27 6  Left ovary findings:     Left ovary:  Visualized    Diameter (mm):  19 8    Length (mm):  34    Width (mm):  23 2  Right ovary findings:     Right ovary:  Visualized    Diameter (mm):  22 7    Length (mm):  38 2    Width (mm):  25 8  Other findings:     Free pelvic fluid: not identified      Free peritoneal fluid: not identified    Post-Procedure Details:     Impression:  Anteverted uterus is inhomogeneous throughout without distinct fibroids  The endometrium is thickened and echogenic  The bilateral ovaries appear within normal limits  No free fluid  Tolerance: Tolerated well, no immediate complications    Complications: no complications    Additional Procedure Comments:      TravelTriangle P5 transvaginal transducer E8C with Serial Number [id number] was used during procedure and subsequently cleaned with high level disinfection utilizing the Trophon Adimab  Endometrial biopsy  Date/Time: 1/9/2019 1:24 PM  Performed by: Abhay Irvin by: Macy Anderson     Consent:     Consent obtained:  Verbal and written    Consent given by:  Patient    Patient questions answered: yes      Patient agrees, verbalizes understanding, and wants to proceed: yes      Instructions and paperwork completed: yes    Indication:     Indications:  Other disorder of menstruation and other abnormal bleeding from female genital tract    Pre-procedure:     Pre-procedure timeout performed: yes    Procedure:     Procedure: endometrial biopsy with Pipelle      A bivalve speculum was placed in the vagina: yes      Cervix cleaned and prepped: yes      Specimen collected: specimen collected and sent to pathology      Patient tolerated procedure well with no complications: yes    Sonohysterogram  Date/Time: 1/9/2019 1:25 PM  Performed by: Robert Friedman by: Cruz Langford     Consent:     Consent obtained:  Verbal and written    Consent given by:  Patient    Patient questions answered: yes      Patient agrees, verbalizes understanding, and wants to proceed: yes    Pre-procedure:     Prepped with: Betadine    Procedure:     Cervix cleaned and prepped: yes      Catheter inserted: yes      Uterine cavity distended with saline: yes    Post-procedure:     No complications: yes    Comments:      Sonohysterogram demonstrates a large polyp extending from the cervix to to fundus within the endometrium, appearing longer than 6cm  Biopsy  Date/Time: 1/9/2019 1:54 PM  Performed by: Robert Friedman by: Cruz Langford     Procedure Details - Skin Biopsy:      Polyp removed from cervix

## 2019-01-11 ENCOUNTER — DOCUMENTATION (OUTPATIENT)
Dept: GYNECOLOGY | Facility: CLINIC | Age: 49
End: 2019-01-11

## 2019-01-11 NOTE — PROGRESS NOTES
Pt  Called C/O of bleeding after  Ultrasound,EMB and polyp removal on Wednesday  Pt also complains of cramping Per Tori Peppers this is normal and to take 800 mg ibuprofen with food every 8 hours

## 2019-01-14 ENCOUNTER — APPOINTMENT (OUTPATIENT)
Dept: LAB | Facility: HOSPITAL | Age: 49
End: 2019-01-14
Payer: COMMERCIAL

## 2019-01-14 ENCOUNTER — TELEPHONE (OUTPATIENT)
Dept: INTERNAL MEDICINE CLINIC | Age: 49
End: 2019-01-14

## 2019-01-14 ENCOUNTER — TRANSCRIBE ORDERS (OUTPATIENT)
Dept: ADMINISTRATIVE | Facility: HOSPITAL | Age: 49
End: 2019-01-14

## 2019-01-14 DIAGNOSIS — E55.9 VITAMIN D DEFICIENCY: ICD-10-CM

## 2019-01-14 DIAGNOSIS — R53.83 OTHER FATIGUE: ICD-10-CM

## 2019-01-14 DIAGNOSIS — I10 BENIGN ESSENTIAL HYPERTENSION: ICD-10-CM

## 2019-01-14 DIAGNOSIS — R53.83 OTHER FATIGUE: Primary | ICD-10-CM

## 2019-01-14 LAB
25(OH)D3 SERPL-MCNC: 77.3 NG/ML (ref 30–100)
BASOPHILS # BLD AUTO: 0.05 THOUSANDS/ΜL (ref 0–0.1)
BASOPHILS NFR BLD AUTO: 1 % (ref 0–1)
EOSINOPHIL # BLD AUTO: 0.03 THOUSAND/ΜL (ref 0–0.61)
EOSINOPHIL NFR BLD AUTO: 0 % (ref 0–6)
ERYTHROCYTE [DISTWIDTH] IN BLOOD BY AUTOMATED COUNT: 12.8 % (ref 11.6–15.1)
HCT VFR BLD AUTO: 34.2 % (ref 34.8–46.1)
HGB BLD-MCNC: 11.5 G/DL (ref 11.5–15.4)
IMM GRANULOCYTES # BLD AUTO: 0.04 THOUSAND/UL (ref 0–0.2)
IMM GRANULOCYTES NFR BLD AUTO: 1 % (ref 0–2)
LYMPHOCYTES # BLD AUTO: 1.85 THOUSANDS/ΜL (ref 0.6–4.47)
LYMPHOCYTES NFR BLD AUTO: 24 % (ref 14–44)
MCH RBC QN AUTO: 28.1 PG (ref 26.8–34.3)
MCHC RBC AUTO-ENTMCNC: 33.6 G/DL (ref 31.4–37.4)
MCV RBC AUTO: 84 FL (ref 82–98)
MONOCYTES # BLD AUTO: 0.5 THOUSAND/ΜL (ref 0.17–1.22)
MONOCYTES NFR BLD AUTO: 6 % (ref 4–12)
NEUTROPHILS # BLD AUTO: 5.32 THOUSANDS/ΜL (ref 1.85–7.62)
NEUTS SEG NFR BLD AUTO: 68 % (ref 43–75)
NRBC BLD AUTO-RTO: 0 /100 WBCS
PLATELET # BLD AUTO: 271 THOUSANDS/UL (ref 149–390)
PMV BLD AUTO: 10.5 FL (ref 8.9–12.7)
RBC # BLD AUTO: 4.09 MILLION/UL (ref 3.81–5.12)
WBC # BLD AUTO: 7.79 THOUSAND/UL (ref 4.31–10.16)

## 2019-01-14 PROCEDURE — 82306 VITAMIN D 25 HYDROXY: CPT

## 2019-01-14 PROCEDURE — 36415 COLL VENOUS BLD VENIPUNCTURE: CPT

## 2019-01-14 PROCEDURE — 85025 COMPLETE CBC W/AUTO DIFF WBC: CPT

## 2019-01-14 NOTE — TELEPHONE ENCOUNTER
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

## 2019-01-15 ENCOUNTER — TELEPHONE (OUTPATIENT)
Dept: INTERNAL MEDICINE CLINIC | Facility: CLINIC | Age: 49
End: 2019-01-15

## 2019-01-15 ENCOUNTER — OFFICE VISIT (OUTPATIENT)
Dept: GYNECOLOGY | Facility: CLINIC | Age: 49
End: 2019-01-15
Payer: COMMERCIAL

## 2019-01-15 VITALS
DIASTOLIC BLOOD PRESSURE: 92 MMHG | BODY MASS INDEX: 34.23 KG/M2 | SYSTOLIC BLOOD PRESSURE: 130 MMHG | HEIGHT: 62 IN | WEIGHT: 186 LBS

## 2019-01-15 DIAGNOSIS — N84.0 ENDOMETRIAL POLYP: ICD-10-CM

## 2019-01-15 DIAGNOSIS — N92.1 MENORRHAGIA WITH IRREGULAR CYCLE: Primary | ICD-10-CM

## 2019-01-15 PROBLEM — N93.9 ABNORMAL UTERINE BLEEDING: Status: ACTIVE | Noted: 2019-01-15

## 2019-01-15 PROCEDURE — 99214 OFFICE O/P EST MOD 30 MIN: CPT | Performed by: OBSTETRICS & GYNECOLOGY

## 2019-01-15 NOTE — TELEPHONE ENCOUNTER
Dr Altagracia Espinoza -    I understand that her results are normal  The patient would like a call back from you because she will have other questions for you in regards to the results

## 2019-01-15 NOTE — TELEPHONE ENCOUNTER
This patient called the office today and would like for you to give her a call back to review the blood test results that she had done, which are in her chart  Please give her a call at [phone]

## 2019-01-15 NOTE — PROGRESS NOTES
Assessment/Plan:    Reviewed findings of biopsy and SIS  Options were discussed  Patient desires hysteroscopy D&C polypectomy with a NovaSure ablation  Procedure and risks were discussed with Olga Marques and all orders for this visit:    Menorrhagia with irregular cycle    Endometrial polyp        Subjective:      Patient ID: Kathryn Hernandez is a 50 y o  female  HPI   presents today complaining of excessive vaginal bleeding  She had a transvaginal scan with saline infusion hysterosonography last week  There was noted be a cervical polyp which was excised;  the SIS revealed a large endometrial polyp measuring 6 cm in length  Hemoglobin was 11 5  She was given a shot of Depo-Provera on  to try to control the bleeding  The following portions of the patient's history were reviewed and updated as appropriate:   She  has a past medical history of Acute cystitis with hematuria; Cellulitis of scalp; Dehydration; Fatigue; chest pain; hematuria; Hypertension; Microscopic hematuria; No known health problems; SOB (shortness of breath); Streptococcal pharyngitis; and Urinary frequency  She   Patient Active Problem List    Diagnosis Date Noted    Sore throat 2018    Weight gain 2018    Vitamin D deficiency 2018    Abnormal uterine bleeding unrelated to menstrual cycle 2017    Essential hypertriglyceridemia 2015    Benign essential hypertension 02/10/2014    Generalized anxiety disorder 10/09/2013     She  has a past surgical history that includes  section; Breast surgery; Tubal ligation (Bilateral); and Reduction mammaplasty  Her family history includes Hyperlipidemia in her father; Hypertension in her father; No Known Problems in her mother  She  reports that she has quit smoking  She has never used smokeless tobacco  She reports that she drinks alcohol  She reports that she does not use drugs    Current Outpatient Prescriptions   Medication Sig Dispense Refill    ALPRAZolam (XANAX) 0 5 mg tablet Take 1 tablet (0 5 mg total) by mouth daily at bedtime as needed for anxiety 30 tablet 5    amLODIPine (NORVASC) 10 mg tablet Take 1 tablet (10 mg total) by mouth daily 90 tablet 1    BIOTIN PO Take 2 each by mouth daily      TORSTEN 0 35 MG tablet Take 1 tablet by mouth daily      Cholecalciferol (VITAMIN D3) 87263 units CAPS Take 1 capsule (50,000 Units total) by mouth once a week 12 capsule 0    docosanol (ABREVA) 10 % Apply topically 4 (four) times a day as needed (cold sores) 2 g 0    ergocalciferol (VITAMIN D2) 50,000 units Take 1 capsule (50,000 Units total) by mouth once a week 12 capsule 1    fenofibrate (TRICOR) 48 mg tablet Take 1 tablet (48 mg total) by mouth daily 90 tablet 0    fluticasone (FLONASE) 50 mcg/act nasal spray 1 spray into each nostril as needed      ibuprofen (MOTRIN) 200 mg tablet Take 200 mg by mouth every 6 (six) hours as needed        lisinopril (ZESTRIL) 20 mg tablet Take 1 tablet (20 mg total) by mouth daily 90 tablet 1    metoprolol succinate (TOPROL-XL) 50 mg 24 hr tablet Take 1 tablet (50 mg total) by mouth daily 90 tablet 1    Multiple Vitamin (MULTI-VITAMIN DAILY PO) Take 1 tablet by mouth daily      norethindrone (AYGESTIN) 5 mg tablet Take 5 mg by mouth daily       No current facility-administered medications for this visit        Current Outpatient Prescriptions on File Prior to Visit   Medication Sig    ALPRAZolam (XANAX) 0 5 mg tablet Take 1 tablet (0 5 mg total) by mouth daily at bedtime as needed for anxiety    amLODIPine (NORVASC) 10 mg tablet Take 1 tablet (10 mg total) by mouth daily    BIOTIN PO Take 2 each by mouth daily    TORSTEN 0 35 MG tablet Take 1 tablet by mouth daily    Cholecalciferol (VITAMIN D3) 13868 units CAPS Take 1 capsule (50,000 Units total) by mouth once a week    docosanol (ABREVA) 10 % Apply topically 4 (four) times a day as needed (cold sores)    ergocalciferol (VITAMIN D2) 50,000 units Take 1 capsule (50,000 Units total) by mouth once a week    fenofibrate (TRICOR) 48 mg tablet Take 1 tablet (48 mg total) by mouth daily    fluticasone (FLONASE) 50 mcg/act nasal spray 1 spray into each nostril as needed    ibuprofen (MOTRIN) 200 mg tablet Take 200 mg by mouth every 6 (six) hours as needed      lisinopril (ZESTRIL) 20 mg tablet Take 1 tablet (20 mg total) by mouth daily    metoprolol succinate (TOPROL-XL) 50 mg 24 hr tablet Take 1 tablet (50 mg total) by mouth daily    Multiple Vitamin (MULTI-VITAMIN DAILY PO) Take 1 tablet by mouth daily    norethindrone (AYGESTIN) 5 mg tablet Take 5 mg by mouth daily     No current facility-administered medications on file prior to visit  She is allergic to levofloxacin and other       Review of Systems    presents today complaining of excessive vaginal bleeding  She had a transvaginal scan with saline infusion hysterosonography last week  There was noted be a cervical polyp which was excised;  the SIS revealed a large endometrial polyp measuring 6 cm in length  Hemoglobin was 11 5  She was given a shot of Depo-Provera on December 19th to try to control the bleeding  Objective:      BP (!) 150/116   Ht 5' 2 4" (1 585 m)   Wt 84 4 kg (186 lb)   BMI 33 59 kg/m²          Physical Exam   Constitutional: She appears well-nourished  Neck: Normal range of motion  Neck supple  No thyromegaly present  Cardiovascular: Normal rate, regular rhythm and normal heart sounds  Pulmonary/Chest: Effort normal and breath sounds normal  No respiratory distress  Abdominal: Soft  Bowel sounds are normal  She exhibits no distension and no mass  There is no tenderness  There is no rebound and no guarding  Hernia confirmed negative in the right inguinal area and confirmed negative in the left inguinal area  Genitourinary: There is no rash or lesion on the right labia  There is no rash or lesion on the left labia   Uterus is not deviated, not enlarged, not fixed and not tender  Cervix exhibits no motion tenderness, no discharge and no friability  Right adnexum displays no mass, no tenderness and no fullness  Left adnexum displays no mass, no tenderness and no fullness  There is bleeding in the vagina  No vaginal discharge found  Lymphadenopathy:        Right: No inguinal adenopathy present  Left: No inguinal adenopathy present

## 2019-01-16 RX ORDER — FERROUS SULFATE 325(65) MG
325 TABLET ORAL
COMMUNITY
End: 2019-02-21

## 2019-01-16 NOTE — PRE-PROCEDURE INSTRUCTIONS
Pre-Surgery Instructions:   Medication Instructions    ALPRAZolam (XANAX) 0 5 mg tablet Instructed patient per Anesthesia Guidelines   amLODIPine (NORVASC) 10 mg tablet Instructed patient per Anesthesia Guidelines   docosanol (ABREVA) 10 % Instructed patient per Anesthesia Guidelines   ergocalciferol (VITAMIN D2) 50,000 units Instructed patient per Anesthesia Guidelines   fenofibrate (TRICOR) 48 mg tablet Instructed patient per Anesthesia Guidelines   ferrous sulfate 325 (65 Fe) mg tablet Instructed patient per Anesthesia Guidelines   fluticasone (FLONASE) 50 mcg/act nasal spray Instructed patient per Anesthesia Guidelines   ibuprofen (MOTRIN) 200 mg tablet Patient was instructed by Physician and understands   lisinopril (ZESTRIL) 20 mg tablet Instructed patient per Anesthesia Guidelines   metoprolol succinate (TOPROL-XL) 50 mg 24 hr tablet Instructed patient per Anesthesia Guidelines   Multiple Vitamin (MULTI-VITAMIN DAILY PO) Instructed patient per Anesthesia Guidelines  Instructed to take metoprolol am of surgery with sip ofw ater per anesthesia  Can take xanax if needed

## 2019-01-18 ENCOUNTER — ANESTHESIA EVENT (OUTPATIENT)
Dept: PERIOP | Facility: HOSPITAL | Age: 49
End: 2019-01-18
Payer: COMMERCIAL

## 2019-01-21 ENCOUNTER — HOSPITAL ENCOUNTER (OUTPATIENT)
Facility: HOSPITAL | Age: 49
Setting detail: OUTPATIENT SURGERY
Discharge: HOME | End: 2019-01-21
Attending: OBSTETRICS & GYNECOLOGY | Admitting: OBSTETRICS & GYNECOLOGY
Payer: COMMERCIAL

## 2019-01-21 ENCOUNTER — ANESTHESIA (OUTPATIENT)
Dept: PERIOP | Facility: HOSPITAL | Age: 49
End: 2019-01-21
Payer: COMMERCIAL

## 2019-01-21 VITALS
SYSTOLIC BLOOD PRESSURE: 153 MMHG | TEMPERATURE: 98.7 F | HEIGHT: 62 IN | DIASTOLIC BLOOD PRESSURE: 93 MMHG | OXYGEN SATURATION: 98 % | BODY MASS INDEX: 33.13 KG/M2 | WEIGHT: 180 LBS | HEART RATE: 71 BPM | RESPIRATION RATE: 16 BRPM

## 2019-01-21 DIAGNOSIS — N84.0 ENDOMETRIAL POLYP: ICD-10-CM

## 2019-01-21 DIAGNOSIS — Z98.890 S/P ENDOMETRIAL ABLATION: Primary | ICD-10-CM

## 2019-01-21 DIAGNOSIS — N93.9 ABNORMAL UTERINE BLEEDING: ICD-10-CM

## 2019-01-21 DIAGNOSIS — G89.18 POST-OP PAIN: Primary | ICD-10-CM

## 2019-01-21 LAB
EXT PREGNANCY TEST URINE: NEGATIVE
GLUCOSE SERPL-MCNC: 108 MG/DL (ref 65–140)
HCT VFR BLD AUTO: 36.2 % (ref 34.8–46.1)

## 2019-01-21 PROCEDURE — 81025 URINE PREGNANCY TEST: CPT | Performed by: ANESTHESIOLOGY

## 2019-01-21 PROCEDURE — 85014 HEMATOCRIT: CPT | Performed by: OBSTETRICS & GYNECOLOGY

## 2019-01-21 PROCEDURE — 58563 HYSTEROSCOPY ABLATION: CPT | Performed by: OBSTETRICS & GYNECOLOGY

## 2019-01-21 PROCEDURE — 88305 TISSUE EXAM BY PATHOLOGIST: CPT | Performed by: PATHOLOGY

## 2019-01-21 PROCEDURE — 82948 REAGENT STRIP/BLOOD GLUCOSE: CPT

## 2019-01-21 RX ORDER — KETOROLAC TROMETHAMINE 30 MG/ML
INJECTION, SOLUTION INTRAMUSCULAR; INTRAVENOUS AS NEEDED
Status: DISCONTINUED | OUTPATIENT
Start: 2019-01-21 | End: 2019-01-21 | Stop reason: SURG

## 2019-01-21 RX ORDER — ACETAMINOPHEN 325 MG/1
650 TABLET ORAL EVERY 4 HOURS PRN
Qty: 30 TABLET | Refills: 0
Start: 2019-01-21 | End: 2019-09-17

## 2019-01-21 RX ORDER — NAPROXEN SODIUM 550 MG/1
550 TABLET ORAL 2 TIMES DAILY WITH MEALS
Qty: 20 TABLET | Refills: 0 | Status: SHIPPED | OUTPATIENT
Start: 2019-01-21 | End: 2019-02-21 | Stop reason: ALTCHOICE

## 2019-01-21 RX ORDER — FENTANYL CITRATE 50 UG/ML
INJECTION, SOLUTION INTRAMUSCULAR; INTRAVENOUS AS NEEDED
Status: DISCONTINUED | OUTPATIENT
Start: 2019-01-21 | End: 2019-01-21 | Stop reason: SURG

## 2019-01-21 RX ORDER — SODIUM CHLORIDE 9 MG/ML
125 INJECTION, SOLUTION INTRAVENOUS CONTINUOUS
Status: DISCONTINUED | OUTPATIENT
Start: 2019-01-21 | End: 2019-01-21

## 2019-01-21 RX ORDER — ACETAMINOPHEN 325 MG/1
650 TABLET ORAL EVERY 6 HOURS PRN
Status: DISCONTINUED | OUTPATIENT
Start: 2019-01-21 | End: 2019-01-21 | Stop reason: HOSPADM

## 2019-01-21 RX ORDER — IBUPROFEN 600 MG/1
600 TABLET ORAL EVERY 6 HOURS PRN
Status: DISCONTINUED | OUTPATIENT
Start: 2019-01-21 | End: 2019-01-21 | Stop reason: HOSPADM

## 2019-01-21 RX ORDER — OXYCODONE HYDROCHLORIDE AND ACETAMINOPHEN 5; 325 MG/1; MG/1
1 TABLET ORAL EVERY 4 HOURS PRN
Status: DISCONTINUED | OUTPATIENT
Start: 2019-01-21 | End: 2019-01-21 | Stop reason: HOSPADM

## 2019-01-21 RX ORDER — ONDANSETRON 2 MG/ML
INJECTION INTRAMUSCULAR; INTRAVENOUS AS NEEDED
Status: DISCONTINUED | OUTPATIENT
Start: 2019-01-21 | End: 2019-01-21 | Stop reason: SURG

## 2019-01-21 RX ORDER — PROPOFOL 10 MG/ML
INJECTION, EMULSION INTRAVENOUS AS NEEDED
Status: DISCONTINUED | OUTPATIENT
Start: 2019-01-21 | End: 2019-01-21 | Stop reason: SURG

## 2019-01-21 RX ORDER — MIDAZOLAM HYDROCHLORIDE 1 MG/ML
INJECTION INTRAMUSCULAR; INTRAVENOUS AS NEEDED
Status: DISCONTINUED | OUTPATIENT
Start: 2019-01-21 | End: 2019-01-21 | Stop reason: SURG

## 2019-01-21 RX ADMIN — ONDANSETRON 4 MG: 2 INJECTION INTRAMUSCULAR; INTRAVENOUS at 07:32

## 2019-01-21 RX ADMIN — SODIUM CHLORIDE: 0.9 INJECTION, SOLUTION INTRAVENOUS at 07:17

## 2019-01-21 RX ADMIN — LIDOCAINE HYDROCHLORIDE 60 MG: 20 INJECTION, SOLUTION INTRAVENOUS at 07:27

## 2019-01-21 RX ADMIN — MIDAZOLAM 2 MG: 1 INJECTION INTRAMUSCULAR; INTRAVENOUS at 07:20

## 2019-01-21 RX ADMIN — ACETAMINOPHEN 650 MG: 325 TABLET, FILM COATED ORAL at 09:38

## 2019-01-21 RX ADMIN — KETOROLAC TROMETHAMINE 30 MG: 30 INJECTION, SOLUTION INTRAMUSCULAR at 07:53

## 2019-01-21 RX ADMIN — PROPOFOL 200 MG: 10 INJECTION, EMULSION INTRAVENOUS at 07:27

## 2019-01-21 RX ADMIN — FENTANYL CITRATE 25 MCG: 50 INJECTION, SOLUTION INTRAMUSCULAR; INTRAVENOUS at 07:43

## 2019-01-21 RX ADMIN — SODIUM CHLORIDE 125 ML/HR: 0.9 INJECTION, SOLUTION INTRAVENOUS at 08:08

## 2019-01-21 RX ADMIN — FENTANYL CITRATE 25 MCG: 50 INJECTION, SOLUTION INTRAMUSCULAR; INTRAVENOUS at 07:33

## 2019-01-21 RX ADMIN — DEXAMETHASONE SODIUM PHOSPHATE 8 MG: 10 INJECTION INTRAMUSCULAR; INTRAVENOUS at 07:32

## 2019-01-21 RX ADMIN — FENTANYL CITRATE 25 MCG: 50 INJECTION, SOLUTION INTRAMUSCULAR; INTRAVENOUS at 08:00

## 2019-01-21 RX ADMIN — IBUPROFEN 600 MG: 600 TABLET ORAL at 09:09

## 2019-01-21 RX ADMIN — PROPOFOL 100 MG: 10 INJECTION, EMULSION INTRAVENOUS at 07:28

## 2019-01-21 RX ADMIN — FENTANYL CITRATE 25 MCG: 50 INJECTION, SOLUTION INTRAMUSCULAR; INTRAVENOUS at 08:03

## 2019-01-21 NOTE — DISCHARGE INSTRUCTIONS
Endometrial Ablation   WHAT YOU SHOULD KNOW:   Endometrial ablation (EA) is a procedure to destroy the endometrium (lining of your uterus)  You may need EA if you have heavy or abnormal vaginal bleeding  AFTER YOU LEAVE:   Medicines:   · Medicines  can help decrease pain, calm your stomach, and control vomiting  · Take your medicine as directed  Call your healthcare provider if you think your medicine is not helping or if you have side effects  Tell him if you are allergic to any medicine  Keep a list of the medicines, vitamins, and herbs you take  Include the amounts, and when and why you take them  Bring the list or the pill bottles to follow-up visits  Carry your medicine list with you in case of an emergency  What to expect after your procedure:   · Cramps, similar to menstrual cramps, for 1 to 2 days    · Watery, bloody discharge for 2 to 3 days that may become light and last a few weeks    · Frequent urination for 24 hours    · Nausea  Activity:  Ask when you can return to your normal activities  You may need to avoid sex for 6 weeks after your procedure  Birth control: You may need to use birth control after your procedure to prevent pregnancy  Pregnancy risks, such as a miscarriage, are higher after EA  Talk to your healthcare provider about birth control and having children after EA  Follow up with your healthcare provider as directed:  Write down your questions so you remember to ask them during your visits  Contact your healthcare provider if:   · The bleeding during your monthly periods has not decreased  · You have pain when you urinate  · You have questions or concerns about your condition or care  Seek care immediately or call 911 if:   · You feel lightheaded, short of breath, and have chest pain  · You cough up blood  · Your arm or leg feels warm, tender, and painful  It may look swollen and red      · You have vaginal bleeding and it is not time for your monthly period  · You have a fever  · You feel dizzy, weak, and confused  · You cannot stop vomiting  · You have severe pain  © 2014 [employer] is for End User's use only and may not be sold, redistributed or otherwise used for commercial purposes  All illustrations and images included in CareNotes® are the copyrighted property of [employer]  or Anil Russell  The above information is an  only  It is not intended as medical advice for individual conditions or treatments  Talk to your doctor, nurse or pharmacist before following any medical regimen to see if it is safe and effective for you

## 2019-01-21 NOTE — H&P (VIEW-ONLY)
Assessment/Plan:    Reviewed findings of biopsy and SIS  Options were discussed  Patient desires hysteroscopy D&C polypectomy with a NovaSure ablation  Procedure and risks were discussed with Olga Marques and all orders for this visit:    Menorrhagia with irregular cycle    Endometrial polyp        Subjective:      Patient ID: Rani Zuluaga is a 50 y o  female  HPI   presents today complaining of excessive vaginal bleeding  She had a transvaginal scan with saline infusion hysterosonography last week  There was noted be a cervical polyp which was excised;  the SIS revealed a large endometrial polyp measuring 6 cm in length  Hemoglobin was 11 5  She was given a shot of Depo-Provera on  to try to control the bleeding  The following portions of the patient's history were reviewed and updated as appropriate:   She  has a past medical history of Acute cystitis with hematuria; Cellulitis of scalp; Dehydration; Fatigue; chest pain; hematuria; Hypertension; Microscopic hematuria; No known health problems; SOB (shortness of breath); Streptococcal pharyngitis; and Urinary frequency  She   Patient Active Problem List    Diagnosis Date Noted    Sore throat 2018    Weight gain 2018    Vitamin D deficiency 2018    Abnormal uterine bleeding unrelated to menstrual cycle 2017    Essential hypertriglyceridemia 2015    Benign essential hypertension 02/10/2014    Generalized anxiety disorder 10/09/2013     She  has a past surgical history that includes  section; Breast surgery; Tubal ligation (Bilateral); and Reduction mammaplasty  Her family history includes Hyperlipidemia in her father; Hypertension in her father; No Known Problems in her mother  She  reports that she has quit smoking  She has never used smokeless tobacco  She reports that she drinks alcohol  She reports that she does not use drugs    Current Outpatient Prescriptions   Medication Sig Dispense Refill    ALPRAZolam (XANAX) 0 5 mg tablet Take 1 tablet (0 5 mg total) by mouth daily at bedtime as needed for anxiety 30 tablet 5    amLODIPine (NORVASC) 10 mg tablet Take 1 tablet (10 mg total) by mouth daily 90 tablet 1    BIOTIN PO Take 2 each by mouth daily      TORSTEN 0 35 MG tablet Take 1 tablet by mouth daily      Cholecalciferol (VITAMIN D3) 99401 units CAPS Take 1 capsule (50,000 Units total) by mouth once a week 12 capsule 0    docosanol (ABREVA) 10 % Apply topically 4 (four) times a day as needed (cold sores) 2 g 0    ergocalciferol (VITAMIN D2) 50,000 units Take 1 capsule (50,000 Units total) by mouth once a week 12 capsule 1    fenofibrate (TRICOR) 48 mg tablet Take 1 tablet (48 mg total) by mouth daily 90 tablet 0    fluticasone (FLONASE) 50 mcg/act nasal spray 1 spray into each nostril as needed      ibuprofen (MOTRIN) 200 mg tablet Take 200 mg by mouth every 6 (six) hours as needed        lisinopril (ZESTRIL) 20 mg tablet Take 1 tablet (20 mg total) by mouth daily 90 tablet 1    metoprolol succinate (TOPROL-XL) 50 mg 24 hr tablet Take 1 tablet (50 mg total) by mouth daily 90 tablet 1    Multiple Vitamin (MULTI-VITAMIN DAILY PO) Take 1 tablet by mouth daily      norethindrone (AYGESTIN) 5 mg tablet Take 5 mg by mouth daily       No current facility-administered medications for this visit        Current Outpatient Prescriptions on File Prior to Visit   Medication Sig    ALPRAZolam (XANAX) 0 5 mg tablet Take 1 tablet (0 5 mg total) by mouth daily at bedtime as needed for anxiety    amLODIPine (NORVASC) 10 mg tablet Take 1 tablet (10 mg total) by mouth daily    BIOTIN PO Take 2 each by mouth daily    TORSTEN 0 35 MG tablet Take 1 tablet by mouth daily    Cholecalciferol (VITAMIN D3) 38248 units CAPS Take 1 capsule (50,000 Units total) by mouth once a week    docosanol (ABREVA) 10 % Apply topically 4 (four) times a day as needed (cold sores)    ergocalciferol (VITAMIN D2) 50,000 units Take 1 capsule (50,000 Units total) by mouth once a week    fenofibrate (TRICOR) 48 mg tablet Take 1 tablet (48 mg total) by mouth daily    fluticasone (FLONASE) 50 mcg/act nasal spray 1 spray into each nostril as needed    ibuprofen (MOTRIN) 200 mg tablet Take 200 mg by mouth every 6 (six) hours as needed      lisinopril (ZESTRIL) 20 mg tablet Take 1 tablet (20 mg total) by mouth daily    metoprolol succinate (TOPROL-XL) 50 mg 24 hr tablet Take 1 tablet (50 mg total) by mouth daily    Multiple Vitamin (MULTI-VITAMIN DAILY PO) Take 1 tablet by mouth daily    norethindrone (AYGESTIN) 5 mg tablet Take 5 mg by mouth daily     No current facility-administered medications on file prior to visit  She is allergic to levofloxacin and other       Review of Systems    presents today complaining of excessive vaginal bleeding  She had a transvaginal scan with saline infusion hysterosonography last week  There was noted be a cervical polyp which was excised;  the SIS revealed a large endometrial polyp measuring 6 cm in length  Hemoglobin was 11 5  She was given a shot of Depo-Provera on December 19th to try to control the bleeding  Objective:      BP (!) 150/116   Ht 5' 2 4" (1 585 m)   Wt 84 4 kg (186 lb)   BMI 33 59 kg/m²          Physical Exam   Constitutional: She appears well-nourished  Neck: Normal range of motion  Neck supple  No thyromegaly present  Cardiovascular: Normal rate, regular rhythm and normal heart sounds  Pulmonary/Chest: Effort normal and breath sounds normal  No respiratory distress  Abdominal: Soft  Bowel sounds are normal  She exhibits no distension and no mass  There is no tenderness  There is no rebound and no guarding  Hernia confirmed negative in the right inguinal area and confirmed negative in the left inguinal area  Genitourinary: There is no rash or lesion on the right labia  There is no rash or lesion on the left labia   Uterus is not deviated, not enlarged, not fixed and not tender  Cervix exhibits no motion tenderness, no discharge and no friability  Right adnexum displays no mass, no tenderness and no fullness  Left adnexum displays no mass, no tenderness and no fullness  There is bleeding in the vagina  No vaginal discharge found  Lymphadenopathy:        Right: No inguinal adenopathy present  Left: No inguinal adenopathy present

## 2019-01-21 NOTE — OP NOTE
OPERATIVE REPORT  PATIENT NAME: Pk Munroe    :  1970  MRN: [id number]  Pt Location: [facility] OR ROOM 01    SURGERY DATE: 2019    Surgeon(s) and Role:     Luz Milan DO - Primary     * Pierre Gonzales MD - Assisting    Preop Diagnosis:  Endometrial polyp [N84 0]  Abnormal uterine bleeding [N93 9]    Post-Op Diagnosis Codes:     * Endometrial polyp [N84 0]     * Abnormal uterine bleeding [N93 9]    Procedure(s) (LRB):  DILATATION AND CURETTAGE (D&C) WITH HYSTEROSCOPY WITH POLYPECTOMY (N/A)  ABLATION ENDOMETRIAL NOVASURE (N/A)    Specimen(s):  ID Type Source Tests Collected by Time Destination   1 : [facility] Tissue Endometrium TISSUE EXAM Jason Biswas DO 2019 0753        Estimated Blood Loss:   10cc    Drains:  None  Deficie of 320cc normal saline distention media    Anesthesia Type:   General    Operative Indications:  Endometrial polyp [N84 0]  Abnormal uterine bleeding [N93 9]      Operative Findings:  Nulliparous appearing cervix  Cervical stenosis  Uterine cavity sounded to 9cm  Patent ostia bilaterally  Approx 1 cm left endometrial polyp near left ostia    Complications:   None    Procedure and Technique:  Patient was identified in the preop holding area as well as the operating suite  Procedure was reviewed and patient consented verbally once again  She underwent successful induction of general anesthesia using LMA  She was placed in the dorsal lithotomy position using yellowfin stirrups  She had pneumatic compression boots in place  She had a Betadine vaginal prep and was draped in sterile fashion  A operative timeout was accomplished        The bladder was drained using a straight catheter for 200cc of clear urine  The anterior lip of the cervix was grasped using a single-tooth tenaculum  Cervix was found to be stenotic and no Trevon dilators used to serially dilate the cervix to accommodate uterine sound  Uterus sounded in midposition fashion to 9 cm    The endocervix was dilated to accommodate the 7 5 mm operating hysteroscope  The myosure XL operating hysteroscope was then introduced with saline as a distention medium  Excellent visualization of the endocervix and endometrial cavity was accomplished  The Myosure device was then introduced through the posterior port and was well visualized  An endometrial polyp approximately 1 cm in size near the left ostia was then excised using the Myosure device  The rest of the uterine cavity was inspected and an additional area of endometrium was identified for excision on the right lateral side  The Myosure and hysteroscope were then withdrawn       The endocervix was further dilated to accommodate a medium sharp curette and endometrial curettage was then undertaken with specimen being sent for routine pathology  The NovaSure ablation device was inserted through the cervix and seated into the endometrial cavity  Device was deployed and rotated in all directions to maximize expansion of the bipolar electrode  Uterine length was determined to be 5 cm and uterine width was determined to be 3 6 cm  A test of the system was performed and the uterine cavity was insufflated with CO2 to ensure cavity integrity and proper placement of the device  No leakage of gas was appreciated  After successful testing, the Novasure system was activated and bipolar cauterization with a Moisture Transport Vacuum System facilitated ablation of the endometrium in approximately 102 seconds under 99 briceno of power  The Novasure system was completely retracted prior to it's removal from the uterine cavity  Inspection of the bipolar electrode showed evidence of burnt endometrial tissue  On withdrawal of the hysteroscope from the uterine cavity, there was note of transition from white ablated endometrium to pink normal cevical mucosa  The tenaculum was removed  The patient had excellent hemostasis at this time    She was cleansed and was awakened from anesthesia without incident and was taken to the recovery room in satisfactory condition        Patient Disposition:  PACU     SIGNATURE: Kerry Dunaway MD  DATE: January 21, 2019  TIME: 7:57 AM

## 2019-01-21 NOTE — ANESTHESIA PREPROCEDURE EVALUATION
Review of Systems/Medical History  Patient summary reviewed  Chart reviewed      Cardiovascular  Exercise tolerance (METS): >4,  Hyperlipidemia, Hypertension ,    Pulmonary  Negative pulmonary ROS        GI/Hepatic  Negative GI/hepatic ROS          Negative  ROS        Endo/Other  Negative endo/other ROS      GYN  Negative gynecology ROS          Hematology  Anemia ,     Musculoskeletal  Negative musculoskeletal ROS        Neurology  Negative neurology ROS      Psychology   Anxiety,              Physical Exam    Airway    Mallampati score: II  TM Distance: <3 FB  Neck ROM: full     Dental       Cardiovascular  Rhythm: regular, Rate: normal,     Pulmonary  Breath sounds clear to auscultation,     Other Findings        Anesthesia Plan  ASA Score- 2     Anesthesia Type- general with ASA Monitors  Additional Monitors:   Airway Plan:         Plan Factors- Patient instructed to abstain from smoking on day of procedure  Patient did not smoke on day of surgery  Induction- intravenous  Postoperative Plan-     Informed Consent- Anesthetic plan and risks discussed with patient

## 2019-01-21 NOTE — ANESTHESIA POSTPROCEDURE EVALUATION
Post-Op Assessment Note      CV Status:  Stable    Mental Status:  Alert and awake    Hydration Status:  Euvolemic    PONV Controlled:  Controlled    Airway Patency:  Patent    Post Op Vitals Reviewed: Yes          Staff: Anesthesiologist           BP      Temp      Pulse     Resp      SpO2 95 % (01/21/19 0843)

## 2019-02-05 ENCOUNTER — TELEPHONE (OUTPATIENT)
Dept: INTERNAL MEDICINE CLINIC | Age: 49
End: 2019-02-05

## 2019-02-05 DIAGNOSIS — E78.00 HYPERCHOLESTEREMIA: ICD-10-CM

## 2019-02-05 DIAGNOSIS — F41.9 ANXIETY: ICD-10-CM

## 2019-02-05 RX ORDER — FENOFIBRATE 48 MG/1
48 TABLET, COATED ORAL DAILY
Qty: 90 TABLET | Refills: 1 | Status: SHIPPED | OUTPATIENT
Start: 2019-02-05 | End: 2019-07-26 | Stop reason: SDUPTHER

## 2019-02-05 NOTE — TELEPHONE ENCOUNTER
Patient called back stating she knows the Xanax is 3 days too early but she recently had surgery and was awaiting biopsy results, thus quite nervous  She did take 2 pills a day for four days and now only has 1 pill left for tomorrow  Patient states she would like the refill a couple days early

## 2019-02-05 NOTE — TELEPHONE ENCOUNTER
LOV 08/07/2018    Pt also asked for Xanax refill but it is too early   Will wait until Friday, 2/8 to fill

## 2019-02-06 RX ORDER — ALPRAZOLAM 0.5 MG/1
0.5 TABLET ORAL
Qty: 30 TABLET | Refills: 0 | Status: SHIPPED | OUTPATIENT
Start: 2019-02-06 | End: 2019-02-21 | Stop reason: SDUPTHER

## 2019-02-06 NOTE — TELEPHONE ENCOUNTER
Pt was given xanax #30 with 5 refills 2018  [facility] pharm to confirm if refill left  States  /3 - controlled substance  Will given #30 with 0 refills

## 2019-02-06 NOTE — TELEPHONE ENCOUNTER
Please run on PDMP and let me know due fill date  Dr Michelle Cruz is out of office and pending info I will fill    THANKS!!

## 2019-02-07 ENCOUNTER — OFFICE VISIT (OUTPATIENT)
Dept: GYNECOLOGY | Facility: CLINIC | Age: 49
End: 2019-02-07

## 2019-02-07 VITALS
BODY MASS INDEX: 33.27 KG/M2 | SYSTOLIC BLOOD PRESSURE: 142 MMHG | DIASTOLIC BLOOD PRESSURE: 88 MMHG | HEIGHT: 62 IN | WEIGHT: 180.8 LBS

## 2019-02-07 DIAGNOSIS — Z48.89 POSTOPERATIVE VISIT: Primary | ICD-10-CM

## 2019-02-07 PROCEDURE — 99024 POSTOP FOLLOW-UP VISIT: CPT | Performed by: OBSTETRICS & GYNECOLOGY

## 2019-02-07 NOTE — PROGRESS NOTES
Patient here today for postoperative check  She is status post hysteroscopic polypectomy and a NovaSure ablation  She is doing well since the surgery pathology report was benign tissue  Physical exam:  Uterus is anteverted normal size and contour and freely mobile nontender    No adnexal masses or tenderness cervix is normal     Impression:  Normal postoperative check    Plan:  Return to the office p r n /annual

## 2019-02-19 ENCOUNTER — TRANSCRIBE ORDERS (OUTPATIENT)
Dept: ADMINISTRATIVE | Facility: HOSPITAL | Age: 49
End: 2019-02-19

## 2019-02-19 ENCOUNTER — APPOINTMENT (OUTPATIENT)
Dept: LAB | Facility: HOSPITAL | Age: 49
End: 2019-02-19
Payer: COMMERCIAL

## 2019-02-19 DIAGNOSIS — I10 ESSENTIAL HYPERTENSION, MALIGNANT: ICD-10-CM

## 2019-02-19 DIAGNOSIS — E78.1 PURE HYPERGLYCERIDEMIA: Primary | ICD-10-CM

## 2019-02-19 DIAGNOSIS — E55.9 VITAMIN D DEFICIENCY: ICD-10-CM

## 2019-02-19 DIAGNOSIS — E78.1 PURE HYPERGLYCERIDEMIA: ICD-10-CM

## 2019-02-19 LAB
25(OH)D3 SERPL-MCNC: 59.1 NG/ML (ref 30–100)
ALBUMIN SERPL BCP-MCNC: 4.1 G/DL (ref 3.5–5)
ALP SERPL-CCNC: 50 U/L (ref 46–116)
ALT SERPL W P-5'-P-CCNC: 25 U/L (ref 12–78)
ANION GAP SERPL CALCULATED.3IONS-SCNC: 9 MMOL/L (ref 4–13)
AST SERPL W P-5'-P-CCNC: 17 U/L (ref 5–45)
BASOPHILS # BLD AUTO: 0.05 THOUSANDS/ΜL (ref 0–0.1)
BASOPHILS NFR BLD AUTO: 1 % (ref 0–1)
BILIRUB SERPL-MCNC: 0.5 MG/DL (ref 0.2–1)
BUN SERPL-MCNC: 15 MG/DL (ref 5–25)
CALCIUM SERPL-MCNC: 9.4 MG/DL (ref 8.3–10.1)
CHLORIDE SERPL-SCNC: 103 MMOL/L (ref 100–108)
CHOLEST SERPL-MCNC: 136 MG/DL (ref 50–200)
CK SERPL-CCNC: 96 U/L (ref 26–192)
CO2 SERPL-SCNC: 27 MMOL/L (ref 21–32)
CREAT SERPL-MCNC: 0.84 MG/DL (ref 0.6–1.3)
EOSINOPHIL # BLD AUTO: 0.12 THOUSAND/ΜL (ref 0–0.61)
EOSINOPHIL NFR BLD AUTO: 2 % (ref 0–6)
ERYTHROCYTE [DISTWIDTH] IN BLOOD BY AUTOMATED COUNT: 13.2 % (ref 11.6–15.1)
GFR SERPL CREATININE-BSD FRML MDRD: 82 ML/MIN/1.73SQ M
GLUCOSE P FAST SERPL-MCNC: 96 MG/DL (ref 65–99)
HCT VFR BLD AUTO: 38.6 % (ref 34.8–46.1)
HDLC SERPL-MCNC: 43 MG/DL (ref 40–60)
HGB BLD-MCNC: 12.7 G/DL (ref 11.5–15.4)
IMM GRANULOCYTES # BLD AUTO: 0.03 THOUSAND/UL (ref 0–0.2)
IMM GRANULOCYTES NFR BLD AUTO: 1 % (ref 0–2)
LDLC SERPL CALC-MCNC: 51 MG/DL (ref 0–100)
LYMPHOCYTES # BLD AUTO: 1.25 THOUSANDS/ΜL (ref 0.6–4.47)
LYMPHOCYTES NFR BLD AUTO: 24 % (ref 14–44)
MCH RBC QN AUTO: 27.2 PG (ref 26.8–34.3)
MCHC RBC AUTO-ENTMCNC: 32.9 G/DL (ref 31.4–37.4)
MCV RBC AUTO: 83 FL (ref 82–98)
MONOCYTES # BLD AUTO: 0.52 THOUSAND/ΜL (ref 0.17–1.22)
MONOCYTES NFR BLD AUTO: 10 % (ref 4–12)
NEUTROPHILS # BLD AUTO: 3.17 THOUSANDS/ΜL (ref 1.85–7.62)
NEUTS SEG NFR BLD AUTO: 62 % (ref 43–75)
NONHDLC SERPL-MCNC: 93 MG/DL
NRBC BLD AUTO-RTO: 0 /100 WBCS
PLATELET # BLD AUTO: 247 THOUSANDS/UL (ref 149–390)
PMV BLD AUTO: 10.7 FL (ref 8.9–12.7)
POTASSIUM SERPL-SCNC: 3.6 MMOL/L (ref 3.5–5.3)
PROT SERPL-MCNC: 7.2 G/DL (ref 6.4–8.2)
RBC # BLD AUTO: 4.67 MILLION/UL (ref 3.81–5.12)
SODIUM SERPL-SCNC: 139 MMOL/L (ref 136–145)
TRIGL SERPL-MCNC: 209 MG/DL
WBC # BLD AUTO: 5.14 THOUSAND/UL (ref 4.31–10.16)

## 2019-02-19 PROCEDURE — 80061 LIPID PANEL: CPT

## 2019-02-19 PROCEDURE — 82306 VITAMIN D 25 HYDROXY: CPT

## 2019-02-19 PROCEDURE — 36415 COLL VENOUS BLD VENIPUNCTURE: CPT | Performed by: FAMILY MEDICINE

## 2019-02-19 PROCEDURE — 82550 ASSAY OF CK (CPK): CPT

## 2019-02-19 PROCEDURE — 80053 COMPREHEN METABOLIC PANEL: CPT | Performed by: FAMILY MEDICINE

## 2019-02-19 PROCEDURE — 85025 COMPLETE CBC W/AUTO DIFF WBC: CPT | Performed by: FAMILY MEDICINE

## 2019-02-21 ENCOUNTER — OFFICE VISIT (OUTPATIENT)
Dept: INTERNAL MEDICINE CLINIC | Facility: CLINIC | Age: 49
End: 2019-02-21
Payer: COMMERCIAL

## 2019-02-21 VITALS
WEIGHT: 183.2 LBS | OXYGEN SATURATION: 98 % | HEIGHT: 62 IN | SYSTOLIC BLOOD PRESSURE: 130 MMHG | HEART RATE: 98 BPM | DIASTOLIC BLOOD PRESSURE: 60 MMHG | TEMPERATURE: 99.6 F | BODY MASS INDEX: 33.71 KG/M2

## 2019-02-21 DIAGNOSIS — F41.9 ANXIETY: ICD-10-CM

## 2019-02-21 DIAGNOSIS — E55.9 VITAMIN D DEFICIENCY: ICD-10-CM

## 2019-02-21 DIAGNOSIS — F41.1 GENERALIZED ANXIETY DISORDER: ICD-10-CM

## 2019-02-21 DIAGNOSIS — I10 BENIGN ESSENTIAL HYPERTENSION: ICD-10-CM

## 2019-02-21 DIAGNOSIS — R63.5 WEIGHT GAIN: ICD-10-CM

## 2019-02-21 DIAGNOSIS — E78.1 ESSENTIAL HYPERTRIGLYCERIDEMIA: ICD-10-CM

## 2019-02-21 DIAGNOSIS — Z12.39 BREAST CANCER SCREENING: Primary | ICD-10-CM

## 2019-02-21 DIAGNOSIS — E78.00 HYPERCHOLESTEREMIA: Primary | ICD-10-CM

## 2019-02-21 PROBLEM — J02.9 SORE THROAT: Status: RESOLVED | Noted: 2018-04-27 | Resolved: 2019-02-21

## 2019-02-21 PROCEDURE — 1036F TOBACCO NON-USER: CPT | Performed by: FAMILY MEDICINE

## 2019-02-21 PROCEDURE — 3075F SYST BP GE 130 - 139MM HG: CPT | Performed by: FAMILY MEDICINE

## 2019-02-21 PROCEDURE — 3078F DIAST BP <80 MM HG: CPT | Performed by: FAMILY MEDICINE

## 2019-02-21 PROCEDURE — 99214 OFFICE O/P EST MOD 30 MIN: CPT | Performed by: FAMILY MEDICINE

## 2019-02-21 RX ORDER — ALPRAZOLAM 0.5 MG/1
0.5 TABLET ORAL
Qty: 30 TABLET | Refills: 3 | Status: SHIPPED | OUTPATIENT
Start: 2019-02-21 | End: 2019-07-08 | Stop reason: SDUPTHER

## 2019-02-21 RX ORDER — FLUOXETINE 10 MG/1
10 TABLET, FILM COATED ORAL DAILY
Qty: 30 TABLET | Refills: 5 | Status: SHIPPED | OUTPATIENT
Start: 2019-02-21 | End: 2020-02-03 | Stop reason: SDUPTHER

## 2019-02-21 NOTE — PROGRESS NOTES
Assessment/Plan:    No problem-specific Assessment & Plan notes found for this encounter  1  Benign essential hypertension (401 1) (I10)   2  LATOSHA (obstructive sleep apnea) (327 23) (G47 33)   3  Vitamin D deficiency (268 9) (E55 9)   4  Generalized anxiety disorder (300 02) (F41 1)   5  Essential hypertriglyceridemia (272 1) (E78 1)     Discussion Summary:   GREAT JOB WITH YOUR CHOLESTEROL !!! labs reviewed  check BP at home  use oral  for new dx of LATOSHA  cholesterol in 6 months  discussed nutrition  Ok to start omega 3 and cont with DASH diet  Labs reviewed  Repeat labs in 4 months need better control of BP and anxiety  Problem List Items Addressed This Visit        Cardiovascular and Mediastinum    Benign essential hypertension       Other    Weight gain    Essential hypertriglyceridemia    Generalized anxiety disorder    Relevant Medications    ALPRAZolam (XANAX) 0 5 mg tablet    FLUoxetine (PROzac) 10 MG tablet    Vitamin D deficiency      Other Visit Diagnoses     Breast cancer screening    -  Primary    Relevant Orders    Mammo screening bilateral w cad    Anxiety        Relevant Medications    ALPRAZolam (XANAX) 0 5 mg tablet    FLUoxetine (PROzac) 10 MG tablet            Subjective:  F/up htn     Patient ID: Pk Munroe is a 50 y o  female  HPI  Hypertension (Follow-Up): The patient presents for follow-up of essential hypertension  The patient states she has been doing well with her blood pressure control since the last visit  She has no comorbid illnesses  Interval Events: home readings are very well controlled  she has them today to review  taking her meds  feels well  Symptoms: denies impaired vision,-- denies dyspnea,-- denies chest pain,-- denies intermittent leg claudication-- and-- denies lower extremity edema  Associated symptoms include no headache,-- no focal neurologic deficits-- and-- no memory loss  Home monitoring:  The patient checks her blood pressure sporadically  Blood pressure control has been good  Lifestyle: Diet: She consumes a diverse and healthy diet  Weight Issues: She has weight concerns  Weight control issues: overweight  Smoking: The patient has never smoked cigarettes  Medications: the patient is adherent with her medication regimen  -- She denies medication side effects  Disease Management: the patient is not doing well with her blood pressure goals  The patient is due for an eye exam       Anxiety Disorder (Follow-Up): The patient is being seen for follow-up of anxiety  The patient reports doing poorly  She has no comorbid illnesses  Uses xanax at night to help sleep  does not take in the daytime  11/8/16: got a new job, friend recently passed  daughter left for college  father is not speaking to her  12/20/16 never started lexapro since she had in the past and had sexual side effects   2/2019: failed lexapro- did not work, wellbutrin, and buspar- ADR  Interval symptoms:  worsened anxiety,-- worsened difficulty concentrating,-- worsened restlessness,-- worsened panic attacks,-- worsened sleep disruption-- and-- worsened depression  Associated symptoms: no grandiosity,-- no racing thoughts,-- no periods of euphoria,-- no hallucinations-- and-- no suicidal ideation  Medications:  the patient is adherent to her medication regimen, but-- she denies medication side effects  Disease management:  the patient is not doing well with her goals  Additional history: was tapering off xanax but now taking regularly at night       Hyperlipidemia (Follow-Up): The patient states her hyperlipidemia has been under good control since the last visit  Comorbid Illnesses: hypertension  Interval Events:   on fenofibrate TG were 229 and now 206,improved  Symptoms:   Medications: the patient is adherent with her medication regimen   The patient is not doing well with her hyperlipidemia goals         The following portions of the patient's history were reviewed and updated as appropriate: allergies, current medications, past family history, past medical history, past social history, past surgical history and problem list     Review of Systems      Constitutional:  Denies fever or chills , 3 lb weight gain  Eyes:  Denies change in visual acuity   HENT:  Denies nasal congestion or sore throat   Respiratory:  Denies cough or shortness of breath or wheezing  Cardiovascular:  Denies palpitations or chest pain  GI:  Denies abdominal pain, nausea, or vomiting  Integument:  Denies rash   Neurologic:  Denies headache or focal weakness, no dizziness        Objective:      /60   Pulse 98   Temp 99 6 °F (37 6 °C) (Oral)   Ht 5' 1 69" (1 567 m)   Wt 83 1 kg (183 lb 3 2 oz)   SpO2 98%   BMI 33 84 kg/m²          Physical Exam    Constitutional:  Well developed, well nourished, no acute distress, non-toxic appearance   Eyes:  PERRL, conjunctiva normal , non icteric sclera  HENT:  Atraumatic, oropharynx moist  Neck-  supple   Respiratory:  CTA b/l, normal breath sounds, no rales, no wheezing   Cardiovascular:  RRR, no murmurs, no LE edema b/l  GI:  Soft, nondistended, normal bowel sounds x 4, nontender, no organomegaly, no mass, no rebound, no guarding   Neurologic:  no focal deficits noted   Psychiatric:  Speech and behavior appropriate , AAO x 3

## 2019-03-09 DIAGNOSIS — I10 HYPERTENSION, UNSPECIFIED TYPE: ICD-10-CM

## 2019-03-11 RX ORDER — METOPROLOL SUCCINATE 50 MG/1
TABLET, EXTENDED RELEASE ORAL
Qty: 90 TABLET | Refills: 1 | OUTPATIENT
Start: 2019-03-11

## 2019-03-12 DIAGNOSIS — I10 HYPERTENSION, UNSPECIFIED TYPE: ICD-10-CM

## 2019-03-12 RX ORDER — METOPROLOL SUCCINATE 50 MG/1
50 TABLET, EXTENDED RELEASE ORAL DAILY
Qty: 90 TABLET | Refills: 1 | Status: SHIPPED | OUTPATIENT
Start: 2019-03-12 | End: 2019-09-08 | Stop reason: SDUPTHER

## 2019-03-21 DIAGNOSIS — N93.9 ABNORMAL UTERINE BLEEDING: Primary | ICD-10-CM

## 2019-03-22 RX ORDER — MEDROXYPROGESTERONE ACETATE 10 MG/1
10 TABLET ORAL DAILY
Qty: 10 TABLET | Refills: 0 | Status: SHIPPED | OUTPATIENT
Start: 2019-03-22 | End: 2019-07-09

## 2019-04-13 DIAGNOSIS — I10 ESSENTIAL HYPERTENSION: ICD-10-CM

## 2019-04-15 RX ORDER — AMLODIPINE BESYLATE 10 MG/1
TABLET ORAL
Qty: 90 TABLET | Refills: 1 | OUTPATIENT
Start: 2019-04-15

## 2019-04-19 DIAGNOSIS — I10 ESSENTIAL HYPERTENSION: ICD-10-CM

## 2019-04-19 DIAGNOSIS — I10 HYPERTENSION, UNSPECIFIED TYPE: ICD-10-CM

## 2019-04-19 RX ORDER — LISINOPRIL 20 MG/1
20 TABLET ORAL DAILY
Qty: 90 TABLET | Refills: 1 | Status: SHIPPED | OUTPATIENT
Start: 2019-04-19 | End: 2019-08-19 | Stop reason: ALTCHOICE

## 2019-04-19 RX ORDER — AMLODIPINE BESYLATE 10 MG/1
10 TABLET ORAL EVERY EVENING
Qty: 90 TABLET | Refills: 1 | Status: SHIPPED | OUTPATIENT
Start: 2019-04-19 | End: 2019-10-23 | Stop reason: SDUPTHER

## 2019-05-29 DIAGNOSIS — E55.9 VITAMIN D DEFICIENCY: ICD-10-CM

## 2019-05-29 RX ORDER — ERGOCALCIFEROL 1.25 MG/1
50000 CAPSULE ORAL WEEKLY
Qty: 12 CAPSULE | Refills: 1 | Status: SHIPPED | OUTPATIENT
Start: 2019-05-29 | End: 2020-01-07 | Stop reason: SDUPTHER

## 2019-06-19 ENCOUNTER — ANNUAL EXAM (OUTPATIENT)
Dept: GYNECOLOGY | Facility: CLINIC | Age: 49
End: 2019-06-19
Payer: COMMERCIAL

## 2019-06-19 VITALS
WEIGHT: 186 LBS | HEART RATE: 106 BPM | DIASTOLIC BLOOD PRESSURE: 90 MMHG | SYSTOLIC BLOOD PRESSURE: 138 MMHG | BODY MASS INDEX: 34.23 KG/M2 | HEIGHT: 62 IN

## 2019-06-19 DIAGNOSIS — Z01.419 ENCOUNTER FOR GYNECOLOGICAL EXAMINATION WITH PAPANICOLAOU SMEAR OF CERVIX: Primary | ICD-10-CM

## 2019-06-19 PROCEDURE — 87624 HPV HI-RISK TYP POOLED RSLT: CPT | Performed by: OBSTETRICS & GYNECOLOGY

## 2019-06-19 PROCEDURE — G0124 SCREEN C/V THIN LAYER BY MD: HCPCS | Performed by: PATHOLOGY

## 2019-06-19 PROCEDURE — G0145 SCR C/V CYTO,THINLAYER,RESCR: HCPCS | Performed by: PATHOLOGY

## 2019-06-19 PROCEDURE — S0612 ANNUAL GYNECOLOGICAL EXAMINA: HCPCS | Performed by: OBSTETRICS & GYNECOLOGY

## 2019-06-24 ENCOUNTER — OFFICE VISIT (OUTPATIENT)
Dept: INTERNAL MEDICINE CLINIC | Age: 49
End: 2019-06-24
Payer: COMMERCIAL

## 2019-06-24 VITALS
TEMPERATURE: 99.3 F | HEART RATE: 87 BPM | WEIGHT: 186.6 LBS | OXYGEN SATURATION: 98 % | BODY MASS INDEX: 34.34 KG/M2 | SYSTOLIC BLOOD PRESSURE: 150 MMHG | DIASTOLIC BLOOD PRESSURE: 100 MMHG | HEIGHT: 62 IN

## 2019-06-24 DIAGNOSIS — J06.9 VIRAL UPPER RESPIRATORY TRACT INFECTION: Primary | ICD-10-CM

## 2019-06-24 DIAGNOSIS — H61.22 IMPACTED CERUMEN, LEFT EAR: ICD-10-CM

## 2019-06-24 PROCEDURE — 69209 REMOVE IMPACTED EAR WAX UNI: CPT | Performed by: NURSE PRACTITIONER

## 2019-06-24 PROCEDURE — 3008F BODY MASS INDEX DOCD: CPT | Performed by: NURSE PRACTITIONER

## 2019-06-24 PROCEDURE — 99213 OFFICE O/P EST LOW 20 MIN: CPT | Performed by: NURSE PRACTITIONER

## 2019-06-24 RX ORDER — PREDNISONE 20 MG/1
20 TABLET ORAL DAILY
Qty: 3 TABLET | Refills: 0 | Status: SHIPPED | OUTPATIENT
Start: 2019-06-24 | End: 2019-06-28 | Stop reason: ALTCHOICE

## 2019-06-24 RX ORDER — BENZONATATE 100 MG/1
100 CAPSULE ORAL 3 TIMES DAILY PRN
Qty: 20 CAPSULE | Refills: 0 | Status: SHIPPED | OUTPATIENT
Start: 2019-06-24 | End: 2019-08-19 | Stop reason: ALTCHOICE

## 2019-06-25 LAB
LAB AP GYN PRIMARY INTERPRETATION: ABNORMAL
Lab: ABNORMAL
PATH INTERP SPEC-IMP: ABNORMAL

## 2019-06-28 ENCOUNTER — OFFICE VISIT (OUTPATIENT)
Dept: INTERNAL MEDICINE CLINIC | Facility: CLINIC | Age: 49
End: 2019-06-28
Payer: COMMERCIAL

## 2019-06-28 VITALS
OXYGEN SATURATION: 98 % | HEART RATE: 82 BPM | WEIGHT: 190.8 LBS | BODY MASS INDEX: 34.9 KG/M2 | DIASTOLIC BLOOD PRESSURE: 86 MMHG | TEMPERATURE: 99.4 F | SYSTOLIC BLOOD PRESSURE: 142 MMHG

## 2019-06-28 DIAGNOSIS — H65.92 FLUID LEVEL BEHIND TYMPANIC MEMBRANE OF LEFT EAR: Primary | ICD-10-CM

## 2019-06-28 LAB
HPV HR 12 DNA CVX QL NAA+PROBE: NEGATIVE
HPV16 DNA CVX QL NAA+PROBE: NEGATIVE
HPV18 DNA CVX QL NAA+PROBE: NEGATIVE

## 2019-06-28 PROCEDURE — 99213 OFFICE O/P EST LOW 20 MIN: CPT | Performed by: NURSE PRACTITIONER

## 2019-06-28 PROCEDURE — 1036F TOBACCO NON-USER: CPT | Performed by: NURSE PRACTITIONER

## 2019-06-28 RX ORDER — AMOXICILLIN AND CLAVULANATE POTASSIUM 875; 125 MG/1; MG/1
1 TABLET, FILM COATED ORAL EVERY 12 HOURS SCHEDULED
Qty: 14 TABLET | Refills: 0 | Status: SHIPPED | OUTPATIENT
Start: 2019-06-28 | End: 2019-07-05

## 2019-07-06 DIAGNOSIS — F41.9 ANXIETY: ICD-10-CM

## 2019-07-08 DIAGNOSIS — F41.9 ANXIETY: ICD-10-CM

## 2019-07-08 RX ORDER — ALPRAZOLAM 0.5 MG/1
0.5 TABLET ORAL
Qty: 30 TABLET | Refills: 3 | Status: SHIPPED | OUTPATIENT
Start: 2019-07-08 | End: 2019-10-30 | Stop reason: SDUPTHER

## 2019-07-08 RX ORDER — ALPRAZOLAM 0.5 MG/1
0.5 TABLET ORAL
Qty: 30 TABLET | Refills: 3 | OUTPATIENT
Start: 2019-07-08

## 2019-07-09 ENCOUNTER — OFFICE VISIT (OUTPATIENT)
Dept: INTERNAL MEDICINE CLINIC | Facility: CLINIC | Age: 49
End: 2019-07-09
Payer: COMMERCIAL

## 2019-07-09 ENCOUNTER — TRANSCRIBE ORDERS (OUTPATIENT)
Dept: ADMINISTRATIVE | Facility: HOSPITAL | Age: 49
End: 2019-07-09

## 2019-07-09 ENCOUNTER — APPOINTMENT (OUTPATIENT)
Dept: LAB | Facility: HOSPITAL | Age: 49
End: 2019-07-09
Payer: COMMERCIAL

## 2019-07-09 VITALS
HEIGHT: 62 IN | OXYGEN SATURATION: 98 % | TEMPERATURE: 98.3 F | DIASTOLIC BLOOD PRESSURE: 92 MMHG | WEIGHT: 185.6 LBS | HEART RATE: 100 BPM | SYSTOLIC BLOOD PRESSURE: 146 MMHG | BODY MASS INDEX: 34.16 KG/M2

## 2019-07-09 DIAGNOSIS — R05.9 COUGH: ICD-10-CM

## 2019-07-09 DIAGNOSIS — F41.1 GENERALIZED ANXIETY DISORDER: Primary | ICD-10-CM

## 2019-07-09 DIAGNOSIS — H05.20 PROPTOSIS: ICD-10-CM

## 2019-07-09 DIAGNOSIS — E78.1 ESSENTIAL HYPERTRIGLYCERIDEMIA: ICD-10-CM

## 2019-07-09 DIAGNOSIS — I10 BENIGN ESSENTIAL HYPERTENSION: ICD-10-CM

## 2019-07-09 PROBLEM — J06.9 VIRAL UPPER RESPIRATORY TRACT INFECTION: Status: RESOLVED | Noted: 2019-06-24 | Resolved: 2019-07-09

## 2019-07-09 LAB
ALBUMIN SERPL BCP-MCNC: 4 G/DL (ref 3.5–5)
ALP SERPL-CCNC: 56 U/L (ref 46–116)
ALT SERPL W P-5'-P-CCNC: 45 U/L (ref 12–78)
ANION GAP SERPL CALCULATED.3IONS-SCNC: 5 MMOL/L (ref 4–13)
AST SERPL W P-5'-P-CCNC: 25 U/L (ref 5–45)
BILIRUB SERPL-MCNC: 0.6 MG/DL (ref 0.2–1)
BUN SERPL-MCNC: 10 MG/DL (ref 5–25)
CALCIUM SERPL-MCNC: 9 MG/DL (ref 8.3–10.1)
CHLORIDE SERPL-SCNC: 102 MMOL/L (ref 100–108)
CHOLEST SERPL-MCNC: 142 MG/DL (ref 50–200)
CO2 SERPL-SCNC: 30 MMOL/L (ref 21–32)
CREAT SERPL-MCNC: 0.82 MG/DL (ref 0.6–1.3)
GFR SERPL CREATININE-BSD FRML MDRD: 84 ML/MIN/1.73SQ M
GLUCOSE P FAST SERPL-MCNC: 83 MG/DL (ref 65–99)
HDLC SERPL-MCNC: 46 MG/DL (ref 40–60)
LDLC SERPL CALC-MCNC: 57 MG/DL (ref 0–100)
NONHDLC SERPL-MCNC: 96 MG/DL
POTASSIUM SERPL-SCNC: 4 MMOL/L (ref 3.5–5.3)
PROT SERPL-MCNC: 7.3 G/DL (ref 6.4–8.2)
SODIUM SERPL-SCNC: 137 MMOL/L (ref 136–145)
TRIGL SERPL-MCNC: 193 MG/DL
TSH SERPL DL<=0.05 MIU/L-ACNC: 1 UIU/ML (ref 0.36–3.74)

## 2019-07-09 PROCEDURE — 99214 OFFICE O/P EST MOD 30 MIN: CPT | Performed by: FAMILY MEDICINE

## 2019-07-09 PROCEDURE — 84443 ASSAY THYROID STIM HORMONE: CPT

## 2019-07-09 PROCEDURE — 36415 COLL VENOUS BLD VENIPUNCTURE: CPT

## 2019-07-09 PROCEDURE — 80053 COMPREHEN METABOLIC PANEL: CPT

## 2019-07-09 PROCEDURE — 80061 LIPID PANEL: CPT

## 2019-07-09 NOTE — PROGRESS NOTES
Assessment/Plan:    No problem-specific Assessment & Plan notes found for this encounter  Problem List Items Addressed This Visit        Cardiovascular and Mediastinum    Benign essential hypertension       Other    Essential hypertriglyceridemia    Relevant Orders    Lipid panel    Comprehensive metabolic panel    Generalized anxiety disorder - Primary    Cough    Proptosis    Relevant Orders    TSH, 3rd generation            Discussion, patient would like to stop her lisinopril and feels that her cough is related to that  I suggested she takes a decongestant over-the-counter that does not increase her blood pressure and antihistamine for postnasal drip  I advised warm water gargles also  I advised her to check her blood pressure at home if she is going to stop her lisinopril and call me with any fluctuations  She may need a different anti a hypertensive  Also recommend starting Prozac for her continued anxiety  Recommend weight loss and exercise which she has not done so far but is looking to joining  A gym  BMI Counseling: Body mass index is 33 95 kg/m²  Discussed the patient's BMI with her  The BMI is above average  BMI counseling and education was provided to the patient  Nutrition recommendations include reducing portion sizes  Subjective:      Patient ID: Dante Abebe is a 52 y o  female  HPI     Cough, had an upper respiratory tract infection with air-fluid levels in her left ear about 2-3 weeks ago  Was treated with medication and antibiotics  She feels that her cough is still there and feels that her lisinopril is contributing to it  She did try decongestant and antihistamine also      The following portions of the patient's history were reviewed and updated as appropriate: allergies, current medications, past family history, past medical history, past social history, past surgical history and problem list     Review of Systems        Constitutional:  Denies fever or chills Eyes:  Denies change in visual acuity   HENT:  Denies nasal congestion or sore throat   Respiratory:  Denies  shortness of breath or wheezing  Cardiovascular:  Denies palpitations or chest pain  GI:  Denies abdominal pain, nausea, or vomiting  Integument:  Denies rash   Neurologic:  Denies headache or focal weakness      Objective:      /92 (BP Location: Left arm, Patient Position: Sitting, Cuff Size: Adult)   Pulse 100   Temp 98 3 °F (36 8 °C) (Oral)   Ht 5' 2" (1 575 m)   Wt 84 2 kg (185 lb 9 6 oz)   SpO2 98%   BMI 33 95 kg/m²          Physical Exam      Constitutional:  Well developed, well nourished, no acute distress, non-toxic appearance   Eyes:  PERRL, conjunctiva normal , non icteric sclera  HENT:  Atraumatic, oropharynx moist   Postnasal drip noted in posterior pharynx Neck-  supple , tympanic membrane normal with no air-fluid levels  Respiratory:  CTA b/l, normal breath sounds, no rales, no wheezing   Cardiovascular:  RRR, no murmurs, no LE edema b/l  GI:  Soft, nondistended, normal bowel sounds x 4, nontender, no organomegaly, no mass, no rebound, no guarding   Neurologic:  no focal deficits noted   Psychiatric:  Speech and behavior appropriate , AAO x 3

## 2019-07-26 DIAGNOSIS — E78.00 HYPERCHOLESTEREMIA: ICD-10-CM

## 2019-07-26 RX ORDER — FENOFIBRATE 48 MG/1
48 TABLET, COATED ORAL DAILY
Qty: 90 TABLET | Refills: 1 | Status: SHIPPED | OUTPATIENT
Start: 2019-07-26 | End: 2019-12-17 | Stop reason: SDUPTHER

## 2019-08-02 DIAGNOSIS — B00.1 RECURRENT COLD SORES: Primary | ICD-10-CM

## 2019-08-02 RX ORDER — ACYCLOVIR 50 MG/G
OINTMENT TOPICAL 4 TIMES DAILY
Qty: 15 G | Refills: 0 | Status: SHIPPED | OUTPATIENT
Start: 2019-08-02 | End: 2020-01-07 | Stop reason: SDUPTHER

## 2019-08-02 NOTE — TELEPHONE ENCOUNTER
Patient is on vacation and she would need a refill on the    acyclovir (ZOVIRAX) 5 % ointment she uses for cold sores      Patient would like if she can have a refill sent to the pharmacy on where she is at    [facility] in [address] [place] [address]

## 2019-08-19 ENCOUNTER — OFFICE VISIT (OUTPATIENT)
Dept: INTERNAL MEDICINE CLINIC | Facility: CLINIC | Age: 49
End: 2019-08-19
Payer: COMMERCIAL

## 2019-08-19 VITALS
WEIGHT: 192.2 LBS | OXYGEN SATURATION: 98 % | BODY MASS INDEX: 34.05 KG/M2 | TEMPERATURE: 98.2 F | HEART RATE: 83 BPM | SYSTOLIC BLOOD PRESSURE: 152 MMHG | HEIGHT: 63 IN | DIASTOLIC BLOOD PRESSURE: 92 MMHG

## 2019-08-19 DIAGNOSIS — I10 BENIGN ESSENTIAL HYPERTENSION: ICD-10-CM

## 2019-08-19 DIAGNOSIS — E55.9 VITAMIN D DEFICIENCY: ICD-10-CM

## 2019-08-19 DIAGNOSIS — R05.9 COUGH: Primary | ICD-10-CM

## 2019-08-19 DIAGNOSIS — E78.1 ESSENTIAL HYPERTRIGLYCERIDEMIA: ICD-10-CM

## 2019-08-19 DIAGNOSIS — F41.1 GENERALIZED ANXIETY DISORDER: ICD-10-CM

## 2019-08-19 DIAGNOSIS — R63.5 WEIGHT GAIN: Primary | ICD-10-CM

## 2019-08-19 PROBLEM — H65.92 FLUID LEVEL BEHIND TYMPANIC MEMBRANE OF LEFT EAR: Status: RESOLVED | Noted: 2019-06-28 | Resolved: 2019-08-19

## 2019-08-19 PROBLEM — H61.22 IMPACTED CERUMEN, LEFT EAR: Status: RESOLVED | Noted: 2019-06-24 | Resolved: 2019-08-19

## 2019-08-19 PROCEDURE — 3008F BODY MASS INDEX DOCD: CPT | Performed by: FAMILY MEDICINE

## 2019-08-19 PROCEDURE — 99214 OFFICE O/P EST MOD 30 MIN: CPT | Performed by: FAMILY MEDICINE

## 2019-08-19 PROCEDURE — 1036F TOBACCO NON-USER: CPT | Performed by: FAMILY MEDICINE

## 2019-08-19 RX ORDER — FLUTICASONE PROPIONATE 50 MCG
1 SPRAY, SUSPENSION (ML) NASAL AS NEEDED
Qty: 1 BOTTLE | Refills: 1 | Status: SHIPPED | OUTPATIENT
Start: 2019-08-19 | End: 2022-02-22

## 2019-08-19 RX ORDER — SPIRONOLACTONE 50 MG/1
50 TABLET, FILM COATED ORAL DAILY
Qty: 90 TABLET | Refills: 1 | Status: SHIPPED | OUTPATIENT
Start: 2019-08-19 | End: 2020-02-19 | Stop reason: SDUPTHER

## 2019-08-19 NOTE — PROGRESS NOTES
Assessment/Plan:    No problem-specific Assessment & Plan notes found for this encounter  Problem List Items Addressed This Visit        Cardiovascular and Mediastinum    Benign essential hypertension    Relevant Medications    spironolactone (ALDACTONE) 50 mg tablet    Other Relevant Orders    Lipid panel    Comprehensive metabolic panel       Other    Weight gain - Primary    Essential hypertriglyceridemia    Relevant Orders    Lipid panel    Generalized anxiety disorder    Vitamin D deficiency    BMI 34 0-34 9,adult            Discussion,  Return 1 month and 4 months with labs  Start spironolactone for better blood pressure control  She admits to eating very poorly in terms of her choices but feels that her quantity is appropriate  She Is not going to the gym  Reviewed her weight which she has had gained since last visit  Patient will return in 1 month for blood pressure check but laboratory data in 4 months  Advised to call us if she has any questions or concerns  Continue with Xanax as needed and I have advised her to start fluoxetine when she sees fit her better anxiety control  She is agreeable today  BMI Counseling: Body mass index is 34 59 kg/m²  Discussed the patient's BMI with her  The BMI is above average  BMI counseling and education was provided to the patient  Nutrition recommendations include reducing portion sizes  Subjective:      Patient ID: Agatha Huitron is a 52 y o  female  HPI     Hypertension (Follow-Up): The patient presents for follow-up of essential hypertension  The patient states she has been doing well with her blood pressure control since the last visit  She has no comorbid illnesses  Interval Events:  Compliant with her medications  Lisinopril was stopped at last visit due to cough however replacement medication has not been started yet since she joined a gym    Since that time she has had difficulty keeping up with her athletic appointments because she found it too difficult and too expensive  She will be provoking her gym membership  Symptoms: denies impaired vision,-- denies dyspnea,-- denies chest pain,-- denies intermittent leg claudication-- and-- denies lower extremity edema  Associated symptoms include no headache,-- no focal neurologic deficits-- and-- no memory loss  Home monitoring: The patient checks her blood pressure sporadically  Blood pressure control has been better than our office  Lifestyle: Diet: She consumes a diverse and healthy diet  Weight Issues: She has weight concerns  Weight control issues: overweight  Smoking: The patient has never smoked cigarettes  Medications: the patient is adherent with her medication regimen  -- She denies medication side effects  Disease Management: the patient is not doing well with her blood pressure goals  The patient is due for an eye exam       Anxiety Disorder (Follow-Up): The patient is being seen for follow-up of anxiety  The patient reports doing poorly  She has no comorbid illnesses  Uses xanax at night to help sleep  does not take in the daytime  11/8/16: got a new job, friend recently passed  daughter left for college  father is not speaking to her  12/20/16 never started lexapro since she had in the past and had sexual side effects   2/2019: failed lexapro- did not work, wellbutrin, and buspar- ADR  Interval symptoms:  worsened anxiety,-- worsened difficulty concentrating,-- worsened restlessness,-- worsened panic attacks,-- worsened sleep disruption-- and-- worsened depression  August 2019, feels relatively well controlled and did not start her fluoxetine  This week and 2 of her children will be going off to college dose she has been experiencing some stress due to that however everything is going well in her life  Associated symptoms: no grandiosity,-- no racing thoughts,-- no periods of euphoria,-- no hallucinations-- and-- no suicidal ideation     Medications:  the patient is adherent to her medication regimen, but-- she denies medication side effects  Disease management:  the patient is not doing well with her goals  Additional history: was tapering off xanax but now taking regularly at night       Hyperlipidemia (Follow-Up): The patient states her hyperlipidemia has been under good control since the last visit  Comorbid Illnesses: hypertension  Interval Events:   on fenofibrate TG were 229 and now 206,improved  Symptoms: TG now 173 aug 2019- improved  Medications: the patient is adherent with her medication regimen   The patient is not doing well with her hyperlipidemia goals    The following portions of the patient's history were reviewed and updated as appropriate: allergies, current medications, past family history, past medical history, past social history, past surgical history and problem list     Review of Systems        Constitutional:  Denies fever or chills , weight gain, 7 lbs  Eyes:  Denies change in visual acuity   HENT:  Denies nasal congestion or sore throat   Respiratory:  Denies  shortness of breath or wheezing, cough resolved after stopping lisinopril and starting decongestant  Cardiovascular:  Denies palpitations or chest pain  GI:  Denies abdominal pain, nausea, or vomiting  Integument:  Denies rash   Neurologic:  Denies headache or focal weakness      Objective:      /92   Pulse 83   Temp 98 2 °F (36 8 °C) (Oral)   Ht 5' 2 5" (1 588 m)   Wt 87 2 kg (192 lb 3 2 oz)   SpO2 98%   BMI 34 59 kg/m²          Physical Exam      Constitutional:  Well developed, well nourished, no acute distress, non-toxic appearance   Eyes:  PERRL, conjunctiva normal , non icteric sclera  HENT:  Atraumatic, oropharynx moist    Neck-  supple , tympanic membrane normal with no air-fluid levels  Respiratory:  CTA b/l, normal breath sounds, no rales, no wheezing   Cardiovascular:  RRR, no murmurs, no LE edema b/l  GI:  Soft, nondistended, normal bowel sounds x 4, nontender, no organomegaly, no mass, no rebound, no guarding   Neurologic:  no focal deficits noted   Psychiatric:  Speech and behavior appropriate , AAO x 3

## 2019-08-22 NOTE — ASSESSMENT & PLAN NOTE
Please call - let pt know I prescribed esomeprazole for pt - sent to [facility] [address], [place].      Stop famotidine.     Slightly worse with DUB and biopsy, results are negative

## 2019-09-08 DIAGNOSIS — I10 HYPERTENSION, UNSPECIFIED TYPE: ICD-10-CM

## 2019-09-09 RX ORDER — METOPROLOL SUCCINATE 50 MG/1
TABLET, EXTENDED RELEASE ORAL
Qty: 90 TABLET | Refills: 1 | Status: SHIPPED | OUTPATIENT
Start: 2019-09-09 | End: 2020-03-08

## 2019-09-17 ENCOUNTER — OFFICE VISIT (OUTPATIENT)
Dept: INTERNAL MEDICINE CLINIC | Facility: CLINIC | Age: 49
End: 2019-09-17
Payer: COMMERCIAL

## 2019-09-17 VITALS
HEIGHT: 62 IN | SYSTOLIC BLOOD PRESSURE: 130 MMHG | HEART RATE: 79 BPM | BODY MASS INDEX: 34.3 KG/M2 | DIASTOLIC BLOOD PRESSURE: 84 MMHG | WEIGHT: 186.4 LBS | OXYGEN SATURATION: 98 % | TEMPERATURE: 98.9 F

## 2019-09-17 DIAGNOSIS — I10 WHITE COAT SYNDROME WITH DIAGNOSIS OF HYPERTENSION: ICD-10-CM

## 2019-09-17 DIAGNOSIS — I10 BENIGN ESSENTIAL HYPERTENSION: Primary | ICD-10-CM

## 2019-09-17 PROCEDURE — 3079F DIAST BP 80-89 MM HG: CPT | Performed by: FAMILY MEDICINE

## 2019-09-17 PROCEDURE — 3075F SYST BP GE 130 - 139MM HG: CPT | Performed by: FAMILY MEDICINE

## 2019-09-17 PROCEDURE — 3008F BODY MASS INDEX DOCD: CPT | Performed by: FAMILY MEDICINE

## 2019-09-17 PROCEDURE — 99213 OFFICE O/P EST LOW 20 MIN: CPT | Performed by: FAMILY MEDICINE

## 2019-10-23 DIAGNOSIS — I10 ESSENTIAL HYPERTENSION: ICD-10-CM

## 2019-10-23 NOTE — TELEPHONE ENCOUNTER
Med:Amlodipine 10 mg tabs  Supply:90 day  Pharmacy: [facility]  Pt phone number: [phone]  Last OV:9/17/19  Next OV:12/17/19

## 2019-10-24 RX ORDER — AMLODIPINE BESYLATE 10 MG/1
10 TABLET ORAL EVERY EVENING
Qty: 90 TABLET | Refills: 1 | Status: SHIPPED | OUTPATIENT
Start: 2019-10-24 | End: 2020-03-23 | Stop reason: SDUPTHER

## 2019-10-30 DIAGNOSIS — F41.9 ANXIETY: ICD-10-CM

## 2019-10-30 RX ORDER — ALPRAZOLAM 0.5 MG/1
0.5 TABLET ORAL
Qty: 30 TABLET | Refills: 3 | Status: SHIPPED | OUTPATIENT
Start: 2019-10-30 | End: 2019-12-17 | Stop reason: SDUPTHER

## 2019-11-07 DIAGNOSIS — I10 HYPERTENSION, UNSPECIFIED TYPE: ICD-10-CM

## 2019-11-07 RX ORDER — LISINOPRIL 20 MG/1
TABLET ORAL
Qty: 90 TABLET | Refills: 1 | OUTPATIENT
Start: 2019-11-07

## 2019-11-08 DIAGNOSIS — I10 HYPERTENSION, UNSPECIFIED TYPE: ICD-10-CM

## 2019-11-08 RX ORDER — LISINOPRIL 20 MG/1
TABLET ORAL
Qty: 90 TABLET | Refills: 1 | OUTPATIENT
Start: 2019-11-08

## 2019-11-20 DIAGNOSIS — E55.9 VITAMIN D DEFICIENCY: ICD-10-CM

## 2019-11-20 RX ORDER — ERGOCALCIFEROL 1.25 MG/1
CAPSULE ORAL
Qty: 12 CAPSULE | Refills: 1 | OUTPATIENT
Start: 2019-11-20

## 2019-12-12 ENCOUNTER — TRANSCRIBE ORDERS (OUTPATIENT)
Dept: ADMINISTRATIVE | Facility: HOSPITAL | Age: 49
End: 2019-12-12

## 2019-12-12 ENCOUNTER — APPOINTMENT (OUTPATIENT)
Dept: LAB | Facility: HOSPITAL | Age: 49
End: 2019-12-12
Payer: COMMERCIAL

## 2019-12-12 DIAGNOSIS — I10 HYPERTENSION, UNSPECIFIED TYPE: ICD-10-CM

## 2019-12-12 DIAGNOSIS — F41.9 ANXIETY: ICD-10-CM

## 2019-12-12 DIAGNOSIS — E78.1 ESSENTIAL HYPERTRIGLYCERIDEMIA: ICD-10-CM

## 2019-12-12 DIAGNOSIS — E78.00 HYPERCHOLESTEREMIA: ICD-10-CM

## 2019-12-12 DIAGNOSIS — R53.83 OTHER FATIGUE: ICD-10-CM

## 2019-12-12 DIAGNOSIS — I10 HYPERTENSION, UNSPECIFIED TYPE: Primary | ICD-10-CM

## 2019-12-12 LAB
ALBUMIN SERPL BCP-MCNC: 4.2 G/DL (ref 3.5–5)
ALP SERPL-CCNC: 45 U/L (ref 46–116)
ALT SERPL W P-5'-P-CCNC: 43 U/L (ref 12–78)
ANION GAP SERPL CALCULATED.3IONS-SCNC: 7 MMOL/L (ref 4–13)
AST SERPL W P-5'-P-CCNC: 28 U/L (ref 5–45)
BILIRUB SERPL-MCNC: 0.9 MG/DL (ref 0.2–1)
BUN SERPL-MCNC: 14 MG/DL (ref 5–25)
CALCIUM SERPL-MCNC: 9 MG/DL (ref 8.3–10.1)
CHLORIDE SERPL-SCNC: 101 MMOL/L (ref 100–108)
CHOLEST SERPL-MCNC: 140 MG/DL (ref 50–200)
CO2 SERPL-SCNC: 29 MMOL/L (ref 21–32)
CREAT SERPL-MCNC: 0.95 MG/DL (ref 0.6–1.3)
GFR SERPL CREATININE-BSD FRML MDRD: 71 ML/MIN/1.73SQ M
GLUCOSE P FAST SERPL-MCNC: 100 MG/DL (ref 65–99)
HDLC SERPL-MCNC: 45 MG/DL
LDLC SERPL CALC-MCNC: 58 MG/DL (ref 0–100)
NONHDLC SERPL-MCNC: 95 MG/DL
POTASSIUM SERPL-SCNC: 3.8 MMOL/L (ref 3.5–5.3)
PROT SERPL-MCNC: 7.2 G/DL (ref 6.4–8.2)
SODIUM SERPL-SCNC: 137 MMOL/L (ref 136–145)
TRIGL SERPL-MCNC: 183 MG/DL

## 2019-12-12 PROCEDURE — 80061 LIPID PANEL: CPT

## 2019-12-12 PROCEDURE — 36415 COLL VENOUS BLD VENIPUNCTURE: CPT

## 2019-12-12 PROCEDURE — 80053 COMPREHEN METABOLIC PANEL: CPT

## 2019-12-13 ENCOUNTER — TELEPHONE (OUTPATIENT)
Dept: INTERNAL MEDICINE CLINIC | Age: 49
End: 2019-12-13

## 2019-12-13 NOTE — TELEPHONE ENCOUNTER
The patient had labs yesterday and would like to know the results  She is "worried" because nothing has uploaded into [employer] yet

## 2019-12-17 ENCOUNTER — OFFICE VISIT (OUTPATIENT)
Dept: INTERNAL MEDICINE CLINIC | Facility: CLINIC | Age: 49
End: 2019-12-17
Payer: COMMERCIAL

## 2019-12-17 VITALS
OXYGEN SATURATION: 99 % | WEIGHT: 184 LBS | DIASTOLIC BLOOD PRESSURE: 84 MMHG | HEIGHT: 62 IN | TEMPERATURE: 98.1 F | BODY MASS INDEX: 33.86 KG/M2 | SYSTOLIC BLOOD PRESSURE: 120 MMHG | HEART RATE: 67 BPM

## 2019-12-17 DIAGNOSIS — F41.1 GENERALIZED ANXIETY DISORDER: ICD-10-CM

## 2019-12-17 DIAGNOSIS — E78.00 HYPERCHOLESTEREMIA: ICD-10-CM

## 2019-12-17 DIAGNOSIS — E55.9 VITAMIN D DEFICIENCY: ICD-10-CM

## 2019-12-17 DIAGNOSIS — F41.9 ANXIETY: ICD-10-CM

## 2019-12-17 DIAGNOSIS — I10 WHITE COAT SYNDROME WITH DIAGNOSIS OF HYPERTENSION: ICD-10-CM

## 2019-12-17 DIAGNOSIS — E78.1 ESSENTIAL HYPERTRIGLYCERIDEMIA: ICD-10-CM

## 2019-12-17 PROCEDURE — 3008F BODY MASS INDEX DOCD: CPT | Performed by: FAMILY MEDICINE

## 2019-12-17 PROCEDURE — 99214 OFFICE O/P EST MOD 30 MIN: CPT | Performed by: FAMILY MEDICINE

## 2019-12-17 PROCEDURE — 3079F DIAST BP 80-89 MM HG: CPT | Performed by: FAMILY MEDICINE

## 2019-12-17 PROCEDURE — 3074F SYST BP LT 130 MM HG: CPT | Performed by: FAMILY MEDICINE

## 2019-12-17 PROCEDURE — 1036F TOBACCO NON-USER: CPT | Performed by: FAMILY MEDICINE

## 2019-12-17 RX ORDER — FENOFIBRATE 48 MG/1
48 TABLET, COATED ORAL
Qty: 90 TABLET | Refills: 1 | Status: SHIPPED | OUTPATIENT
Start: 2019-12-17 | End: 2020-06-05 | Stop reason: SDUPTHER

## 2019-12-17 RX ORDER — ALPRAZOLAM 0.5 MG/1
0.5 TABLET ORAL
Qty: 40 TABLET | Refills: 3 | Status: SHIPPED | OUTPATIENT
Start: 2019-12-17 | End: 2020-01-27 | Stop reason: SDUPTHER

## 2019-12-17 NOTE — PROGRESS NOTES
Assessment/Plan:    1  BMI 34 0-34 9,adult    2  Essential hypertriglyceridemia  -     Comprehensive metabolic panel; Future  -     Lipid panel; Future    3  Generalized anxiety disorder    4  Vitamin D deficiency  -     Vitamin D 25 hydroxy; Future    5  White coat syndrome with diagnosis of hypertension    6  Anxiety  -     ALPRAZolam (XANAX) 0 5 mg tablet; Take 1 tablet (0 5 mg total) by mouth daily at bedtime as needed for anxiety    7  Hypercholesteremia  -     fenofibrate (TRICOR) 48 mg tablet; Take 1 tablet (48 mg total) by mouth daily at bedtime            Reviewed labs and need better control over glucose and TG  No change to meds, advised going back on KETO diet and increasing exercise  Daughter here today  Advised to start prozac due to uncontrolled anxiety    There are no Patient Instructions on file for this visit  Return in about 6 months (around 6/17/2020) for Recheck  Subjective:      Patient ID: Ursula Qiu is a 52 y o  female  Chief Complaint   Patient presents with    Follow-up     4 month follow up blood work done on 12/12/19    Anxiety     pt stated she is sometime rarly taking 2 tablets daily discuss changing the instructions     Hypertension       HPI  Hypertension (Follow-Up): The patient presents for follow-up of essential hypertension  The patient states she has been doing well with her blood pressure control since the last visit  She has no comorbid illnesses  Interval Events:  Compliant with her medications  Lisinopril was stopped at last visit due to cough however replacement medication has not been started yet since she joined a gym  Since that time she has had difficulty keeping up with her athletic appointments because she found it too difficult and too expensive  She will be provoking her gym membership dec 2019 took 2 xanax before coming here, daughter drove her   Does not check BP at home    Symptoms: denies impaired vision,-- denies dyspnea,-- denies chest pain,-- denies intermittent leg claudication-- and-- denies lower extremity edema  Associated symptoms include no headache,-- no focal neurologic deficits-- and-- no memory loss  Home monitoring: The patient checks her blood pressure sporadically  Blood pressure control has been better than our office sept : 11/65 home reading that was checked in our office also, on spironolactone- toelrating well  Still has significant anxiety when going to any doctor's office  Lifestyle: Diet: She consumes a diverse and healthy diet  Weight Issues: She has weight concerns  Weight control issues: overweight  Smoking: The patient has never smoked cigarettes  Medications: the patient is adherent with her medication regimen  -- She denies medication side effects  Disease Management: the patient is not doing well with her blood pressure goals  The patient is due for an eye exam          Anxiety Disorder (Follow-Up): The patient is being seen for follow-up of anxiety  The patient reports doing poorly  She has no comorbid illnesses  Uses xanax at night to help sleep  does not take in the daytime  11/8/16: got a new job, friend recently passed  daughter left for college  father is not speaking to her  12/20/16 never started lexapro since she had in the past and had sexual side effects   2/2019: failed lexapro- did not work, wellbutrin, and buspar- ADR  Dec 2019 takes xanax every night to sleep  Feels well  Interval symptoms:  worsened anxiety,-- worsened difficulty concentrating,-- worsened restlessness,-- worsened panic attacks,-- worsened sleep disruption-- and-- worsened depression  August 2019, feels relatively well controlled and did not start her fluoxetine  This week and 2 of her children will be going off to college dose she has been experiencing some stress due to that however everything is going well in her life    Associated symptoms: no grandiosity,-- no racing thoughts,-- no periods of euphoria,-- no hallucinations-- and-- no suicidal ideation  Medications:  the patient is adherent to her medication regimen, but-- she denies medication side effects  Disease management:  the patient is not doing well with her goals  Additional history: was tapering off xanax but now taking regularly at night       Hyperlipidemia (Follow-Up): The patient states her hyperlipidemia has been under good control since the last visit  Comorbid Illnesses: hypertension  Interval Events:   on fenofibrate TG were 229 and now 206,improved  Symptoms: TG now 173 aug 2019- improved dec 2019, was on KETo and now is eating more  Carbs  Lost 2 lbs, TG 10 points better than last visit  Medications: the patient is adherent with her medication regimen   The patient is not doing well with her hyperlipidemia goals       The following portions of the patient's history were reviewed and updated as appropriate: allergies, current medications, past family history, past medical history, past social history, past surgical history and problem list     Review of Systems    Constitutional:  Denies fever or chills   Eyes:  Denies double or blurry vision  HENT:  Denies nasal congestion or sore throat   Respiratory:  Denies cough or shortness of breath or wheezing  Cardiovascular:  Denies palpitations or chest pain  GI:  Denies abdominal pain, nausea, or vomiting  Integument:  Denies rash , no open areas  Neurologic:  Denies headache or focal weakness          Current Outpatient Medications   Medication Sig Dispense Refill    acyclovir (ZOVIRAX) 5 % ointment Apply topically 4 (four) times a day PRN 15 g 0    ALPRAZolam (XANAX) 0 5 mg tablet Take 1 tablet (0 5 mg total) by mouth daily at bedtime as needed for anxiety 40 tablet 3    amLODIPine (NORVASC) 10 mg tablet Take 1 tablet (10 mg total) by mouth every evening 90 tablet 1    ergocalciferol (VITAMIN D2) 50,000 units Take 1 capsule (50,000 Units total) by mouth once a week 12 capsule 1    fenofibrate (TRICOR) 48 mg tablet Take 1 tablet (48 mg total) by mouth daily at bedtime 90 tablet 1    fluticasone (FLONASE) 50 mcg/act nasal spray 1 spray into each nostril as needed (prn) 1 Bottle 1    ibuprofen (MOTRIN) 200 mg tablet Take 200-800 mg by mouth every 6 (six) hours as needed        metoprolol succinate (TOPROL-XL) 50 mg 24 hr tablet TAKE 1 TABLET BY MOUTH EVERY DAY 90 tablet 1    Multiple Vitamin (MULTI-VITAMIN DAILY PO) Take 1 tablet by mouth daily      spironolactone (ALDACTONE) 50 mg tablet Take 1 tablet (50 mg total) by mouth daily 90 tablet 1    FLUoxetine (PROzac) 10 MG tablet Take 1 tablet (10 mg total) by mouth daily (Patient not taking: Reported on 8/19/2019) 30 tablet 5     No current facility-administered medications for this visit          Objective:    /84 (BP Location: Left arm, Patient Position: Sitting, Cuff Size: Large)   Pulse 67   Temp 98 1 °F (36 7 °C) (Oral)   Ht 5' 1 5" (1 562 m)   Wt 83 5 kg (184 lb)   SpO2 99%   BMI 34 20 kg/m²        Physical Exam       Constitutional:  Well developed, well nourished, no acute distress, non-toxic appearance   Eyes:  PERRL, conjunctiva normal , non icteric sclera  HENT:  Atraumatic, oropharynx moist  Neck-  supple   Respiratory:  CTA b/l, normal breath sounds, no rales, no wheezing   Cardiovascular:  RRR, no murmurs, no LE edema b/l  GI:  Soft, nondistended, normal bowel sounds x 4, nontender, no organomegaly, no mass, no rebound, no guarding   Neurologic:  no focal deficits noted   Psychiatric:  Speech and behavior appropriate , AAO x 3        Karoline Greene,

## 2020-01-07 DIAGNOSIS — B00.1 RECURRENT COLD SORES: ICD-10-CM

## 2020-01-07 DIAGNOSIS — E55.9 VITAMIN D DEFICIENCY: ICD-10-CM

## 2020-01-07 RX ORDER — ERGOCALCIFEROL 1.25 MG/1
50000 CAPSULE ORAL WEEKLY
Qty: 12 CAPSULE | Refills: 1 | Status: SHIPPED | OUTPATIENT
Start: 2020-01-07 | End: 2020-06-29

## 2020-01-07 RX ORDER — ACYCLOVIR 50 MG/G
OINTMENT TOPICAL 4 TIMES DAILY
Qty: 15 G | Refills: 0 | Status: SHIPPED | OUTPATIENT
Start: 2020-01-07 | End: 2022-02-04 | Stop reason: SDUPTHER

## 2020-01-17 DIAGNOSIS — I10 BENIGN ESSENTIAL HYPERTENSION: ICD-10-CM

## 2020-01-17 RX ORDER — SPIRONOLACTONE 50 MG/1
TABLET, FILM COATED ORAL
Qty: 90 TABLET | Refills: 0 | OUTPATIENT
Start: 2020-01-17

## 2020-01-21 ENCOUNTER — OFFICE VISIT (OUTPATIENT)
Dept: INTERNAL MEDICINE CLINIC | Age: 50
End: 2020-01-21
Payer: COMMERCIAL

## 2020-01-21 VITALS
HEIGHT: 62 IN | HEART RATE: 73 BPM | BODY MASS INDEX: 34.04 KG/M2 | TEMPERATURE: 99.5 F | OXYGEN SATURATION: 98 % | SYSTOLIC BLOOD PRESSURE: 138 MMHG | DIASTOLIC BLOOD PRESSURE: 90 MMHG | WEIGHT: 185 LBS

## 2020-01-21 DIAGNOSIS — E78.00 HYPERCHOLESTEREMIA: ICD-10-CM

## 2020-01-21 DIAGNOSIS — I10 ESSENTIAL HYPERTENSION: ICD-10-CM

## 2020-01-21 DIAGNOSIS — K42.9 UMBILICAL HERNIA WITHOUT OBSTRUCTION AND WITHOUT GANGRENE: Primary | ICD-10-CM

## 2020-01-21 PROCEDURE — 99213 OFFICE O/P EST LOW 20 MIN: CPT | Performed by: INTERNAL MEDICINE

## 2020-01-21 PROCEDURE — 3008F BODY MASS INDEX DOCD: CPT | Performed by: INTERNAL MEDICINE

## 2020-01-21 NOTE — PROGRESS NOTES
Chief Complaint   Patient presents with   Monica Joy     was diagnised 2 years ago, has started to bother her         Assessment/Plan:    Umbilical hernia - She was told by her gynecologist that she has an umbilical hernia  Pt states a couple weeks ago, it felt uncomfortable  She told three Advil tablets and it heled resolved the discomfort  She states she felt it yesterday as well  Today she feels fine  Negative for cardio, respiratory, and HENT problems  Pt states she had some diarrhea occasionally  Pt will need to complete blood work - CBC, BUN, creatinine  She will also complete CT of the abdomen  I referred her to Dr Nolia Duane  HTN - Pts BP in the office today was 138/90 while sitting  She is currently taking amlodipine 10 mg and Toprol- xl 50 mg, and aldactone 50 mg  She was advised to continue on medication and limit salt and caffeine intake  HLD - Pt is currently taking Tricor 48 mg  Lipid panel from 12/12/19 revealed elevated triglycerides at 184  She should limit her white starches and concentrated sweets  I suggest she continue on medication regimen  BMI - Pts BMI in the office today was 33 84  She was advised the following: Continue minimizing processed foods and increase natural foods in diet  Minimize white starches, carbohydrates, and concentration sugars from diet  Eat smaller portions and decrease caloric intake  Try to exercise at least 150 min/week  Health Maintenance - Pt will need to schedule annul physical  Follow up in 4 weeks or as needed  BMI Counseling: Body mass index is 33 84 kg/m²  The BMI is above normal  Nutrition recommendations include decreasing portion sizes, encouraging healthy choices of fruits and vegetables, decreasing fast food intake, consuming healthier snacks, limiting drinks that contain sugar, moderation in carbohydrate intake and reducing intake of cholesterol   Exercise recommendations include moderate physical activity 150 minutes/week and obtaining a gym membership  No pharmacotherapy was ordered  I have spent 30 minutes with Patient and family today in which greater than 50% of this time was spent in counseling/coordination of care regarding Intructions for management, Patient and family education and Importance of tx compliance  Garrett Petty was seen today for hernia  Diagnoses and all orders for this visit:    Umbilical hernia without obstruction and without gangrene    BMI 33 0-33 9,adult    Hypercholesteremia    Essential hypertension          Subjective:      Patient ID: Olga Montgomery is a 52 y o  female  This is the case of a 52year old female with a PMHx of HTN presenting in the office for possible hernia   She was told by her gynecologist that she has an umbilical hernia  Pt states a couple weeks ago, it felt uncomfortable  She told three Advil tablets and it heled resolved the discomfort  She states she felt it yesterday as well  Today she feels fine  Negative for cardio, respiratory, and HENT problems  Pt states she had some diarrhea occasionally  Pt has a 15year old at home  She denies doing any heavy work  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  Pt is on a keto diet at this time  Pt's lipid panal      Lab work was reviewed and discussed with the patient  The following portions of the patient's history were reviewed and updated as appropriate: allergies, current medications, past family history, past medical history, past social history, past surgical history and problem list     Review of Systems   Constitutional: Negative for chills, diaphoresis, fatigue and fever  HENT: Negative for congestion, mouth sores, postnasal drip and rhinorrhea  Eyes: Negative  Respiratory: Negative for cough  Cardiovascular: Negative for chest pain, palpitations and leg swelling  Gastrointestinal: Positive for abdominal distention and diarrhea  Negative for abdominal pain, blood in stool, constipation, nausea and vomiting  Genitourinary: Negative for difficulty urinating, dysuria, flank pain, frequency, urgency, vaginal bleeding, vaginal discharge and vaginal pain  Musculoskeletal: Negative  Neurological: Negative for dizziness, seizures, facial asymmetry, light-headedness, numbness and headaches  Psychiatric/Behavioral: Negative for agitation, behavioral problems, confusion, decreased concentration, dysphoric mood, hallucinations, self-injury, sleep disturbance and suicidal ideas  The patient is not nervous/anxious and is not hyperactive            Allergies   Allergen Reactions    Levofloxacin Rash     Past Medical History:   Diagnosis Date    Abnormal uterine bleeding (AUB)     Anemia     Anxiety     Endometrial polyp     High triglycerides     Hypertension     Seasonal allergies     Sleep apnea     uses mouth guard    Vitamin D deficiency     Wears contact lenses      Current Outpatient Medications on File Prior to Visit   Medication Sig Dispense Refill    acyclovir (ZOVIRAX) 5 % ointment Apply topically 4 (four) times a day PRN 15 g 0    ALPRAZolam (XANAX) 0 5 mg tablet Take 1 tablet (0 5 mg total) by mouth daily at bedtime as needed for anxiety 40 tablet 3    amLODIPine (NORVASC) 10 mg tablet Take 1 tablet (10 mg total) by mouth every evening 90 tablet 1    ergocalciferol (VITAMIN D2) 50,000 units Take 1 capsule (50,000 Units total) by mouth once a week 12 capsule 1    fenofibrate (TRICOR) 48 mg tablet Take 1 tablet (48 mg total) by mouth daily at bedtime 90 tablet 1    fluticasone (FLONASE) 50 mcg/act nasal spray 1 spray into each nostril as needed (prn) 1 Bottle 1    ibuprofen (MOTRIN) 200 mg tablet Take 200-800 mg by mouth every 6 (six) hours as needed        metoprolol succinate (TOPROL-XL) 50 mg 24 hr tablet TAKE 1 TABLET BY MOUTH EVERY DAY 90 tablet 1    Multiple Vitamin (MULTI-VITAMIN DAILY PO) Take 1 tablet by mouth daily      spironolactone (ALDACTONE) 50 mg tablet Take 1 tablet (50 mg total) by mouth daily 90 tablet 1    FLUoxetine (PROzac) 10 MG tablet Take 1 tablet (10 mg total) by mouth daily (Patient not taking: Reported on 2019) 30 tablet 5     No current facility-administered medications on file prior to visit        Past Surgical History:   Procedure Laterality Date    BREAST SURGERY       SECTION      x3    HYSTEROSCOPY W/ POLYPECTOMY      GA HYSTEROSCOPY,W/ENDO BX N/A 2019    Procedure: DILATATION AND CURETTAGE (D&C) WITH HYSTEROSCOPY WITH POLYPECTOMY;  Surgeon: Aryan Street DO;  [facility];  Service: Gynecology    GA HYSTEROSCOPY,W/ENDOMETRIAL ABLATION N/A 2019    Procedure: ABLATION ENDOMETRIAL Buffy Pali;  Surgeon: Aryan Street DO;  [facility];  Service: Gynecology    REDUCTION MAMMAPLASTY      TUBAL LIGATION Bilateral      Social History     Socioeconomic History    Marital status: /Civil Union     Spouse name: Not on file    Number of children: Not on file    Years of education: Not on file    Highest education level: Not on file   Occupational History    Occupation: HOMEMAKER     Comment: AS [employer]   Social Needs    Financial resource strain: Not on file    Food insecurity:     Worry: Not on file     Inability: Not on file    Transportation needs:     Medical: Not on file     Non-medical: Not on file   Tobacco Use    Smoking status: Former Smoker     Last attempt to quit: 1989     Years since quittin 0    Smokeless tobacco: Never Used    Tobacco comment: smoked for 1 year in college   Substance and Sexual Activity    Alcohol use: Yes     Comment: occasional    Drug use: No    Sexual activity: Yes     Birth control/protection: Other   Lifestyle    Physical activity:     Days per week: Not on file     Minutes per session: Not on file    Stress: Not on file   Relationships    Social connections:     Talks on phone: Not on file     Gets together: Not on file     Attends Advent service: Not on file     Active member of club or organization: Not on file     Attends meetings of clubs or organizations: Not on file     Relationship status: Not on file    Intimate partner violence:     Fear of current or ex partner: Not on file     Emotionally abused: Not on file     Physically abused: Not on file     Forced sexual activity: Not on file   Other Topics Concern    Not on file   Social History Narrative    Currently      WEIGHTLIFTING AND CARDIO, 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    RECREATIONAL ACTIVITIES: WEIGHTLIFTING       Objective:      /90 (BP Location: Left arm, Patient Position: Sitting, Cuff Size: Adult)   Pulse 73   Temp 99 5 °F (37 5 °C) (Tympanic)   Ht 5' 2" (1 575 m)   Wt 83 9 kg (185 lb)   SpO2 98%   BMI 33 84 kg/m²          Physical Exam          Attestation:   By signing my name below, Shereen Cadet, attest that this documentation has been prepared under the direction and in the presence of Adelaide Gastelum MD  Electronically Signed: Boogie Bates  1/21/20      I, Adelaide Gastelum, personally performed the services described in this documentation  All medical record entries made by the neftaliibwolf were at my direction and in my presence  I have reviewed the chart and discharge instructions and agree that the record reflects my personal performance and is accurate and complete   Adelaide Gastelum MD  1/21/20

## 2020-01-21 NOTE — PATIENT INSTRUCTIONS
1  [facility] at [phone]  - press 5 and then ext [phone] to make appt  2  Complete all blood work  3  Schedule annual physical    4  Suggest 10% weight loss  5  Limit your salt and caffeine intake  6  Continue minimizing processed foods and increase natural foods in diet  7  Minimize white starches, carbohydrates, and concentration sugars from diet  8  Eat smaller portions and decrease caloric intake  9  Try to exercise at least 150 min/week  10  Follow up in 4 weeks or as needed     11  Next time you think of beautiful things, don't forget to count yourself in  =)

## 2020-01-22 ENCOUNTER — APPOINTMENT (OUTPATIENT)
Dept: LAB | Facility: HOSPITAL | Age: 50
End: 2020-01-22
Attending: INTERNAL MEDICINE
Payer: COMMERCIAL

## 2020-01-22 ENCOUNTER — TRANSCRIBE ORDERS (OUTPATIENT)
Dept: ADMINISTRATIVE | Facility: HOSPITAL | Age: 50
End: 2020-01-22

## 2020-01-22 DIAGNOSIS — K42.9 UMBILICAL HERNIA WITHOUT OBSTRUCTION AND WITHOUT GANGRENE: ICD-10-CM

## 2020-01-22 DIAGNOSIS — E78.00 HYPERCHOLESTEREMIA: ICD-10-CM

## 2020-01-22 LAB
BASOPHILS # BLD AUTO: 0.05 THOUSANDS/ΜL (ref 0–0.1)
BASOPHILS NFR BLD AUTO: 1 % (ref 0–1)
BUN SERPL-MCNC: 14 MG/DL (ref 5–25)
CREAT SERPL-MCNC: 0.9 MG/DL (ref 0.6–1.3)
EOSINOPHIL # BLD AUTO: 0.09 THOUSAND/ΜL (ref 0–0.61)
EOSINOPHIL NFR BLD AUTO: 1 % (ref 0–6)
ERYTHROCYTE [DISTWIDTH] IN BLOOD BY AUTOMATED COUNT: 13.1 % (ref 11.6–15.1)
GFR SERPL CREATININE-BSD FRML MDRD: 75 ML/MIN/1.73SQ M
HCT VFR BLD AUTO: 37.8 % (ref 34.8–46.1)
HGB BLD-MCNC: 13.2 G/DL (ref 11.5–15.4)
IMM GRANULOCYTES # BLD AUTO: 0.08 THOUSAND/UL (ref 0–0.2)
IMM GRANULOCYTES NFR BLD AUTO: 1 % (ref 0–2)
LYMPHOCYTES # BLD AUTO: 1.24 THOUSANDS/ΜL (ref 0.6–4.47)
LYMPHOCYTES NFR BLD AUTO: 17 % (ref 14–44)
MCH RBC QN AUTO: 30.1 PG (ref 26.8–34.3)
MCHC RBC AUTO-ENTMCNC: 34.9 G/DL (ref 31.4–37.4)
MCV RBC AUTO: 86 FL (ref 82–98)
MONOCYTES # BLD AUTO: 0.57 THOUSAND/ΜL (ref 0.17–1.22)
MONOCYTES NFR BLD AUTO: 8 % (ref 4–12)
NEUTROPHILS # BLD AUTO: 5.15 THOUSANDS/ΜL (ref 1.85–7.62)
NEUTS SEG NFR BLD AUTO: 72 % (ref 43–75)
NRBC BLD AUTO-RTO: 0 /100 WBCS
PLATELET # BLD AUTO: 242 THOUSANDS/UL (ref 149–390)
PMV BLD AUTO: 10.6 FL (ref 8.9–12.7)
RBC # BLD AUTO: 4.39 MILLION/UL (ref 3.81–5.12)
WBC # BLD AUTO: 7.18 THOUSAND/UL (ref 4.31–10.16)

## 2020-01-22 PROCEDURE — 82565 ASSAY OF CREATININE: CPT

## 2020-01-22 PROCEDURE — 84520 ASSAY OF UREA NITROGEN: CPT

## 2020-01-22 PROCEDURE — 85025 COMPLETE CBC W/AUTO DIFF WBC: CPT

## 2020-01-22 PROCEDURE — 36415 COLL VENOUS BLD VENIPUNCTURE: CPT

## 2020-01-23 ENCOUNTER — HOSPITAL ENCOUNTER (OUTPATIENT)
Dept: RADIOLOGY | Age: 50
Discharge: HOME | End: 2020-01-23
Payer: COMMERCIAL

## 2020-01-23 DIAGNOSIS — K42.9 UMBILICAL HERNIA WITHOUT OBSTRUCTION AND WITHOUT GANGRENE: ICD-10-CM

## 2020-01-23 PROCEDURE — 74177 CT ABD & PELVIS W/CONTRAST: CPT

## 2020-01-23 RX ADMIN — IOHEXOL 100 ML: 350 INJECTION, SOLUTION INTRAVENOUS at 14:21

## 2020-01-27 ENCOUNTER — TELEPHONE (OUTPATIENT)
Dept: INTERNAL MEDICINE CLINIC | Age: 50
End: 2020-01-27

## 2020-01-27 DIAGNOSIS — F41.9 ANXIETY: ICD-10-CM

## 2020-01-27 RX ORDER — ALPRAZOLAM 0.5 MG/1
TABLET ORAL
Qty: 40 TABLET | Refills: 0 | OUTPATIENT
Start: 2020-01-27

## 2020-01-27 RX ORDER — ALPRAZOLAM 0.5 MG/1
0.5 TABLET ORAL
Qty: 40 TABLET | Refills: 3 | Status: SHIPPED | OUTPATIENT
Start: 2020-01-27 | End: 2020-03-23 | Stop reason: SDUPTHER

## 2020-01-27 NOTE — TELEPHONE ENCOUNTER
Pt reports that Dr Eric Henley wrote for her to have 10 extra tabs if needed during the month     Appears to be so my order in chart, however orders on medication were not changed     Please change orders to state 1-2 tabs daily as needed so that pt can fill medication         Will call pharmacy and cancel previous order, once new order is submitted

## 2020-01-28 NOTE — TELEPHONE ENCOUNTER
I spoke to the pt and she is going to Dr Florence Landaverde for her hernia  She will be in touch with her GYN for her problems identified on her CT scan of abdomen and pelvis

## 2020-01-28 NOTE — TELEPHONE ENCOUNTER
Please review CT of the abdomen which was ordered for an umbilical hernia  It showed a endometrium irregular margins and cysti changes  Please review and call pt with your recommendation  Thank you       Dr Anuja Toledo

## 2020-01-28 NOTE — TELEPHONE ENCOUNTER
Patient received phone call from [facility] on [facility] told patient to early to fill Prescription For Alprazolam not until 02/04/2020  State limitations on filling medication

## 2020-02-03 ENCOUNTER — DOCUMENTATION (OUTPATIENT)
Dept: GYNECOLOGY | Facility: CLINIC | Age: 50
End: 2020-02-03

## 2020-02-03 ENCOUNTER — OFFICE VISIT (OUTPATIENT)
Dept: GYNECOLOGY | Facility: CLINIC | Age: 50
End: 2020-02-03
Payer: COMMERCIAL

## 2020-02-03 VITALS
DIASTOLIC BLOOD PRESSURE: 98 MMHG | WEIGHT: 184.4 LBS | SYSTOLIC BLOOD PRESSURE: 142 MMHG | HEIGHT: 62 IN | HEART RATE: 89 BPM | BODY MASS INDEX: 33.93 KG/M2

## 2020-02-03 DIAGNOSIS — N93.9 MENSTRUAL BLEEDING PROBLEM: Primary | ICD-10-CM

## 2020-02-03 DIAGNOSIS — F41.9 ANXIETY: ICD-10-CM

## 2020-02-03 PROCEDURE — 3008F BODY MASS INDEX DOCD: CPT | Performed by: FAMILY MEDICINE

## 2020-02-03 PROCEDURE — 99213 OFFICE O/P EST LOW 20 MIN: CPT | Performed by: OBSTETRICS & GYNECOLOGY

## 2020-02-03 PROCEDURE — 3008F BODY MASS INDEX DOCD: CPT | Performed by: OBSTETRICS & GYNECOLOGY

## 2020-02-03 PROCEDURE — 3077F SYST BP >= 140 MM HG: CPT | Performed by: OBSTETRICS & GYNECOLOGY

## 2020-02-03 PROCEDURE — 3080F DIAST BP >= 90 MM HG: CPT | Performed by: OBSTETRICS & GYNECOLOGY

## 2020-02-03 PROCEDURE — 1036F TOBACCO NON-USER: CPT | Performed by: OBSTETRICS & GYNECOLOGY

## 2020-02-03 NOTE — PROGRESS NOTES
Pt called last night she had an episode of a pain like cramp  Did not last long, then today at work she started with a brown gush of blood, and is very nervous about is  is quite upset and crying  Spoke to Juany she will see her in the office today  Made appt for pt today

## 2020-02-03 NOTE — PROGRESS NOTES
Assessment/Plan:    Discussed with patient the probability that bleeding is likely menstrual  She was advised to use ibuprofen 600mg every 6 hours with food for a day or two to decrease cramping/bleeding  She will schedule TVS with Dr Howard Irvin for peace of mind to evaluate the endometrium  I have spent 15 mins with patient today in which greater than 50% of this time was spent in counseling  Diagnoses and all orders for this visit:    Menstrual bleeding problem        Subjective:      Patient ID: Georgie Estrada is a 52 y o  female  The patient called this morning of very upset because she had cramping last night and bleeding this morning  She is s/p endometrial ablation 1/2019 and this is her first episode of bleeding  She used on pad this morning and states the bleeding did slow down  She is concerned because she is scheduled for abdominal hernia repair and CT was done that showed irregular margins of endometrium with cystic changes  She has been reassured that the irregular margins of endometrium are secondary to ablation, but she is upset and anxious that something else is wrong  Her  is present with patient at visit today  She denies any other gyn concerns  The following portions of the patient's history were reviewed and updated as appropriate: allergies, current medications, past family history, past medical history, past social history, past surgical history and problem list     Review of Systems   Constitutional: Negative  HENT: Negative  Respiratory: Negative  Cardiovascular: Negative  Gastrointestinal: Negative  Endocrine: Negative  Genitourinary: Positive for menstrual problem  Negative for difficulty urinating, dyspareunia, dysuria, frequency, pelvic pain, urgency, vaginal bleeding, vaginal discharge and vaginal pain  Musculoskeletal: Negative  Skin: Negative  Neurological: Negative  Psychiatric/Behavioral: Negative  Objective:      /98 (BP Location: Right arm)   Pulse 89   Ht 5' 2" (1 575 m)   Wt 83 6 kg (184 lb 6 4 oz)   BMI 33 73 kg/m²          Physical Exam   Constitutional: She appears well-developed and well-nourished  HENT:   Head: Normocephalic and atraumatic  Abdominal: Hernia confirmed negative in the right inguinal area and confirmed negative in the left inguinal area  Genitourinary: Pelvic exam was performed with patient supine  There is no rash, tenderness, lesion or injury on the right labia  There is no rash, tenderness, lesion or injury on the left labia  Cervix exhibits no motion tenderness, no discharge and no friability  There is bleeding (consistent with menstrual bleeding noted) in the vagina  No erythema or tenderness in the vagina  No foreign body in the vagina  No signs of injury around the vagina  No vaginal discharge found  Lymphadenopathy: No inguinal adenopathy noted on the right or left side  Nursing note and vitals reviewed

## 2020-02-04 RX ORDER — FLUOXETINE 10 MG/1
10 TABLET, FILM COATED ORAL DAILY
Qty: 30 TABLET | Refills: 4 | Status: SHIPPED | OUTPATIENT
Start: 2020-02-04 | End: 2021-04-06

## 2020-02-05 ENCOUNTER — ULTRASOUND (OUTPATIENT)
Dept: GYNECOLOGY | Facility: CLINIC | Age: 50
End: 2020-02-05
Payer: COMMERCIAL

## 2020-02-05 ENCOUNTER — OFFICE VISIT (OUTPATIENT)
Dept: GYNECOLOGY | Facility: CLINIC | Age: 50
End: 2020-02-05
Payer: COMMERCIAL

## 2020-02-05 DIAGNOSIS — N93.9 ABNORMAL UTERINE BLEEDING (AUB): Primary | ICD-10-CM

## 2020-02-05 DIAGNOSIS — Z01.812 ENCOUNTER FOR PRE-OPERATIVE LABORATORY TESTING: Primary | ICD-10-CM

## 2020-02-05 DIAGNOSIS — N80.0 ADENOMYOSIS: ICD-10-CM

## 2020-02-05 DIAGNOSIS — Z98.890 HISTORY OF ENDOMETRIAL ABLATION: ICD-10-CM

## 2020-02-05 DIAGNOSIS — R93.89 ENDOMETRIAL THICKENING ON ULTRASOUND: ICD-10-CM

## 2020-02-05 PROBLEM — N80.03 UTERUS, ADENOMYOSIS: Status: ACTIVE | Noted: 2020-02-05

## 2020-02-05 PROBLEM — N93.8 DYSFUNCTIONAL UTERINE BLEEDING: Status: ACTIVE | Noted: 2020-02-05

## 2020-02-05 PROBLEM — N80.00 UTERUS, ADENOMYOSIS: Status: ACTIVE | Noted: 2020-02-05

## 2020-02-05 PROCEDURE — 3077F SYST BP >= 140 MM HG: CPT | Performed by: OBSTETRICS & GYNECOLOGY

## 2020-02-05 PROCEDURE — 76830 TRANSVAGINAL US NON-OB: CPT | Performed by: OBSTETRICS & GYNECOLOGY

## 2020-02-05 PROCEDURE — 3080F DIAST BP >= 90 MM HG: CPT | Performed by: OBSTETRICS & GYNECOLOGY

## 2020-02-05 PROCEDURE — 1036F TOBACCO NON-USER: CPT | Performed by: OBSTETRICS & GYNECOLOGY

## 2020-02-05 PROCEDURE — 58100 BIOPSY OF UTERUS LINING: CPT | Performed by: OBSTETRICS & GYNECOLOGY

## 2020-02-05 PROCEDURE — 99214 OFFICE O/P EST MOD 30 MIN: CPT | Performed by: OBSTETRICS & GYNECOLOGY

## 2020-02-05 NOTE — PROGRESS NOTES
Assessment/Plan:    Reviewed findings of the CT scan and the transvaginal scan with patient along with the inability to access endometrial cavity to rule out uterine cancer  We discussed options at this point including following versus medical management for suspected adenomyosis versus surgical management  Patient is opted to proceed with a total laparoscopic hysterectomy with bilateral salpingectomy  The risks of the surgery were discussed including but not limited to infection, hemorrhage, bowel, bladder, ureteral injury, possible necessity for laparotomy  Diagnoses and all orders for this visit:    Abnormal uterine bleeding (AUB)    Endometrial thickening on ultrasound    Adenomyosis    History of endometrial ablation        Subjective:      Patient ID: Rei Upton is a 52 y o  female  HPI  P3 status post endometrial ablation with polypectomy in January of 2019  She presented to the office complaining of moderate vaginal bleeding with cramping on February 3rd of this year  She describes this as a moderate flow  A CT scan was done cyst to evaluate for an umbilical hernia  CT scan read revealed an endometrial irregularity with cystic areas  She presented to the office for transvaginal sonography which revealed endometrial thickening of 11 mm  An endometrial biopsy was attempted but due to intrauterine scarring this was not accessible  3 C sections    The following portions of the patient's history were reviewed and updated as appropriate:   She  has a past medical history of Abnormal uterine bleeding (AUB), Anemia, Anxiety, Endometrial polyp, High triglycerides, Hypertension, Seasonal allergies, Sleep apnea, Vitamin D deficiency, and Wears contact lenses    She   Patient Active Problem List    Diagnosis Date Noted    White coat syndrome with diagnosis of hypertension 09/17/2019    BMI 34 0-34 9,adult 08/19/2019    Proptosis 07/09/2019    Status post endometrial ablation 01/21/2019    Endometrial polyp 01/15/2019    Abnormal uterine bleeding 01/15/2019    Weight gain 2018    Vitamin D deficiency 2018    Abnormal uterine bleeding unrelated to menstrual cycle 2017    Essential hypertriglyceridemia 2015    Benign essential hypertension 02/10/2014    Generalized anxiety disorder 10/09/2013     She  has a past surgical history that includes  section; Breast surgery; Tubal ligation (Bilateral); Reduction mammaplasty; Hysteroscopy w/ polypectomy; pr hysteroscopy,w/endo bx (N/A, 2019); and pr hysteroscopy,w/endometrial ablation (N/A, 2019)  Her family history includes Hyperlipidemia in her father; Hypertension in her father; No Known Problems in her mother  She  reports that she quit smoking about 31 years ago  She has never used smokeless tobacco  She reports that she drinks alcohol  She reports that she does not use drugs    Current Outpatient Medications   Medication Sig Dispense Refill    acyclovir (ZOVIRAX) 5 % ointment Apply topically 4 (four) times a day PRN 15 g 0    ALPRAZolam (XANAX) 0 5 mg tablet Take 1 tablet (0 5 mg total) by mouth daily at bedtime as needed for anxiety May take 1 extra tab daily if needed 40 tablet 3    amLODIPine (NORVASC) 10 mg tablet Take 1 tablet (10 mg total) by mouth every evening 90 tablet 1    ergocalciferol (VITAMIN D2) 50,000 units Take 1 capsule (50,000 Units total) by mouth once a week 12 capsule 1    fenofibrate (TRICOR) 48 mg tablet Take 1 tablet (48 mg total) by mouth daily at bedtime 90 tablet 1    FLUoxetine (PROzac) 10 MG tablet Take 1 tablet (10 mg total) by mouth daily 30 tablet 4    fluticasone (FLONASE) 50 mcg/act nasal spray 1 spray into each nostril as needed (prn) 1 Bottle 1    ibuprofen (MOTRIN) 200 mg tablet Take 200-800 mg by mouth every 6 (six) hours as needed        metoprolol succinate (TOPROL-XL) 50 mg 24 hr tablet TAKE 1 TABLET BY MOUTH EVERY DAY 90 tablet 1    Multiple Vitamin (MULTI-VITAMIN DAILY PO) Take 1 tablet by mouth daily      spironolactone (ALDACTONE) 50 mg tablet Take 1 tablet (50 mg total) by mouth daily 90 tablet 1     No current facility-administered medications for this visit  Current Outpatient Medications on File Prior to Visit   Medication Sig    acyclovir (ZOVIRAX) 5 % ointment Apply topically 4 (four) times a day PRN    ALPRAZolam (XANAX) 0 5 mg tablet Take 1 tablet (0 5 mg total) by mouth daily at bedtime as needed for anxiety May take 1 extra tab daily if needed    amLODIPine (NORVASC) 10 mg tablet Take 1 tablet (10 mg total) by mouth every evening    ergocalciferol (VITAMIN D2) 50,000 units Take 1 capsule (50,000 Units total) by mouth once a week    fenofibrate (TRICOR) 48 mg tablet Take 1 tablet (48 mg total) by mouth daily at bedtime    FLUoxetine (PROzac) 10 MG tablet Take 1 tablet (10 mg total) by mouth daily    fluticasone (FLONASE) 50 mcg/act nasal spray 1 spray into each nostril as needed (prn)    ibuprofen (MOTRIN) 200 mg tablet Take 200-800 mg by mouth every 6 (six) hours as needed      metoprolol succinate (TOPROL-XL) 50 mg 24 hr tablet TAKE 1 TABLET BY MOUTH EVERY DAY    Multiple Vitamin (MULTI-VITAMIN DAILY PO) Take 1 tablet by mouth daily    spironolactone (ALDACTONE) 50 mg tablet Take 1 tablet (50 mg total) by mouth daily     No current facility-administered medications on file prior to visit  She is allergic to levofloxacin       Review of Systems   Constitutional: Negative  HENT: Negative for sore throat and trouble swallowing  Gastrointestinal: Negative  Genitourinary: Positive for menstrual problem and pelvic pain  Objective: There were no vitals taken for this visit  Physical Exam   Constitutional: She appears well-nourished  Neck: Normal range of motion  Neck supple  No thyromegaly present  Cardiovascular: Normal rate, regular rhythm and normal heart sounds     Pulmonary/Chest: Effort normal and breath sounds normal  No respiratory distress  Abdominal: Soft  Bowel sounds are normal  She exhibits no distension and no mass  There is no tenderness  There is no rebound and no guarding  No hernia  Hernia confirmed negative in the right inguinal area and confirmed negative in the left inguinal area  Genitourinary: There is no rash, tenderness or lesion on the right labia  There is no rash, tenderness or lesion on the left labia  Uterus is not deviated, not enlarged, not fixed and not tender  Cervix exhibits no motion tenderness, no discharge and no friability  Right adnexum displays no mass, no tenderness and no fullness  Left adnexum displays no mass, no tenderness and no fullness  No erythema, tenderness or bleeding in the vagina  No vaginal discharge found  Lymphadenopathy:     She has no cervical adenopathy  No inguinal adenopathy noted on the right or left side

## 2020-02-05 NOTE — PROGRESS NOTES
AMB US Pelvic Non OB  Date/Time: 2/5/2020 12:29 PM  Performed by: Carisa Ceron  Authorized by: Rubén Goodson DO     Procedure details:     Indications: non-obstetric vaginal bleeding      Technique:  Transvaginal US, Non-OB    Position: lithotomy exam    Uterine findings:     Length (cm): 9 25    Height (cm):  5 66    Width (cm):  7 84    Endometrial stripe: identified      Endometrium thickness (mm):  11 7  Left ovary findings:     Left ovary:  Visualized    Length (cm): 2 19    Height (cm): 1 45    Width (cm): 1 66  Right ovary findings:     Right ovary:  Visualized    Length (cm): 2 1    Height (cm): 1 39    Width (cm): 1 34  Other findings:     Free pelvic fluid: not identified      Free peritoneal fluid: not identified    Post-Procedure Details:     Impression:  Anteverted uterus is inhomogeneous throughout and contains an anterior subserosal fibroid 2 1cm  The texture of the myometrium appears consistent with adenomyosis  The bilateral ovaries appear within normal limits  No free fluid  Tolerance: Tolerated well, no immediate complications    Complications: no complications    Additional Procedure Comments:      Experience Headphones P5 transvaginal transducer E8C with Serial Number [id number] was used during procedure and subsequently cleaned with high level disinfection utilizing the Troph[employer] PixelPlay  Ultrasound performed at:     [facility] [address] [facility] [place]  [address], [address] [facility]  Phone: [phone]  Fax:  [phone]    Endometrial biopsy  Date/Time: 2/5/2020 12:30 PM  Performed by: Rubén Goodson DO  Authorized by: Rubén Goodson DO   Unsuccessful attempt    Consent:     Consent obtained:  Verbal and written    Consent given by:  Patient    Patient questions answered: yes      Patient agrees, verbalizes understanding, and wants to proceed: yes      Instructions and paperwork completed: yes    Indication:     Indications:  Other disorder of menstruation and other abnormal bleeding from female genital tract    Pre-procedure:     Pre-procedure timeout performed: yes    Procedure:     Procedure: endometrial biopsy with Pipelle      A bivalve speculum was placed in the vagina: yes      Cervix cleaned and prepped: yes    Comments:      A biopsy was attempted and subsequently discontinued due to patient discomfort

## 2020-02-05 NOTE — H&P (VIEW-ONLY)
Assessment/Plan:    Reviewed findings of the CT scan and the transvaginal scan with patient along with the inability to access endometrial cavity to rule out uterine cancer  We discussed options at this point including following versus medical management for suspected adenomyosis versus surgical management  Patient is opted to proceed with a total laparoscopic hysterectomy with bilateral salpingectomy  The risks of the surgery were discussed including but not limited to infection, hemorrhage, bowel, bladder, ureteral injury, possible necessity for laparotomy  Diagnoses and all orders for this visit:    Abnormal uterine bleeding (AUB)    Endometrial thickening on ultrasound    Adenomyosis    History of endometrial ablation        Subjective:      Patient ID: Leyla Rendon is a 52 y o  female  HPI  P3 status post endometrial ablation with polypectomy in January of 2019  She presented to the office complaining of moderate vaginal bleeding with cramping on February 3rd of this year  She describes this as a moderate flow  A CT scan was done cyst to evaluate for an umbilical hernia  CT scan read revealed an endometrial irregularity with cystic areas  She presented to the office for transvaginal sonography which revealed endometrial thickening of 11 mm  An endometrial biopsy was attempted but due to intrauterine scarring this was not accessible  3 C sections    The following portions of the patient's history were reviewed and updated as appropriate:   She  has a past medical history of Abnormal uterine bleeding (AUB), Anemia, Anxiety, Endometrial polyp, High triglycerides, Hypertension, Seasonal allergies, Sleep apnea, Vitamin D deficiency, and Wears contact lenses    She   Patient Active Problem List    Diagnosis Date Noted    White coat syndrome with diagnosis of hypertension 09/17/2019    BMI 34 0-34 9,adult 08/19/2019    Proptosis 07/09/2019    Status post endometrial ablation 01/21/2019    Endometrial polyp 01/15/2019    Abnormal uterine bleeding 01/15/2019    Weight gain 2018    Vitamin D deficiency 2018    Abnormal uterine bleeding unrelated to menstrual cycle 2017    Essential hypertriglyceridemia 2015    Benign essential hypertension 02/10/2014    Generalized anxiety disorder 10/09/2013     She  has a past surgical history that includes  section; Breast surgery; Tubal ligation (Bilateral); Reduction mammaplasty; Hysteroscopy w/ polypectomy; pr hysteroscopy,w/endo bx (N/A, 2019); and pr hysteroscopy,w/endometrial ablation (N/A, 2019)  Her family history includes Hyperlipidemia in her father; Hypertension in her father; No Known Problems in her mother  She  reports that she quit smoking about 31 years ago  She has never used smokeless tobacco  She reports that she drinks alcohol  She reports that she does not use drugs    Current Outpatient Medications   Medication Sig Dispense Refill    acyclovir (ZOVIRAX) 5 % ointment Apply topically 4 (four) times a day PRN 15 g 0    ALPRAZolam (XANAX) 0 5 mg tablet Take 1 tablet (0 5 mg total) by mouth daily at bedtime as needed for anxiety May take 1 extra tab daily if needed 40 tablet 3    amLODIPine (NORVASC) 10 mg tablet Take 1 tablet (10 mg total) by mouth every evening 90 tablet 1    ergocalciferol (VITAMIN D2) 50,000 units Take 1 capsule (50,000 Units total) by mouth once a week 12 capsule 1    fenofibrate (TRICOR) 48 mg tablet Take 1 tablet (48 mg total) by mouth daily at bedtime 90 tablet 1    FLUoxetine (PROzac) 10 MG tablet Take 1 tablet (10 mg total) by mouth daily 30 tablet 4    fluticasone (FLONASE) 50 mcg/act nasal spray 1 spray into each nostril as needed (prn) 1 Bottle 1    ibuprofen (MOTRIN) 200 mg tablet Take 200-800 mg by mouth every 6 (six) hours as needed        metoprolol succinate (TOPROL-XL) 50 mg 24 hr tablet TAKE 1 TABLET BY MOUTH EVERY DAY 90 tablet 1    Multiple Vitamin (MULTI-VITAMIN DAILY PO) Take 1 tablet by mouth daily      spironolactone (ALDACTONE) 50 mg tablet Take 1 tablet (50 mg total) by mouth daily 90 tablet 1     No current facility-administered medications for this visit  Current Outpatient Medications on File Prior to Visit   Medication Sig    acyclovir (ZOVIRAX) 5 % ointment Apply topically 4 (four) times a day PRN    ALPRAZolam (XANAX) 0 5 mg tablet Take 1 tablet (0 5 mg total) by mouth daily at bedtime as needed for anxiety May take 1 extra tab daily if needed    amLODIPine (NORVASC) 10 mg tablet Take 1 tablet (10 mg total) by mouth every evening    ergocalciferol (VITAMIN D2) 50,000 units Take 1 capsule (50,000 Units total) by mouth once a week    fenofibrate (TRICOR) 48 mg tablet Take 1 tablet (48 mg total) by mouth daily at bedtime    FLUoxetine (PROzac) 10 MG tablet Take 1 tablet (10 mg total) by mouth daily    fluticasone (FLONASE) 50 mcg/act nasal spray 1 spray into each nostril as needed (prn)    ibuprofen (MOTRIN) 200 mg tablet Take 200-800 mg by mouth every 6 (six) hours as needed      metoprolol succinate (TOPROL-XL) 50 mg 24 hr tablet TAKE 1 TABLET BY MOUTH EVERY DAY    Multiple Vitamin (MULTI-VITAMIN DAILY PO) Take 1 tablet by mouth daily    spironolactone (ALDACTONE) 50 mg tablet Take 1 tablet (50 mg total) by mouth daily     No current facility-administered medications on file prior to visit  She is allergic to levofloxacin       Review of Systems   Constitutional: Negative  HENT: Negative for sore throat and trouble swallowing  Gastrointestinal: Negative  Genitourinary: Positive for menstrual problem and pelvic pain  Objective: There were no vitals taken for this visit  Physical Exam   Constitutional: She appears well-nourished  Neck: Normal range of motion  Neck supple  No thyromegaly present  Cardiovascular: Normal rate, regular rhythm and normal heart sounds     Pulmonary/Chest: Effort normal and breath sounds normal  No respiratory distress  Abdominal: Soft  Bowel sounds are normal  She exhibits no distension and no mass  There is no tenderness  There is no rebound and no guarding  No hernia  Hernia confirmed negative in the right inguinal area and confirmed negative in the left inguinal area  Genitourinary: There is no rash, tenderness or lesion on the right labia  There is no rash, tenderness or lesion on the left labia  Uterus is not deviated, not enlarged, not fixed and not tender  Cervix exhibits no motion tenderness, no discharge and no friability  Right adnexum displays no mass, no tenderness and no fullness  Left adnexum displays no mass, no tenderness and no fullness  No erythema, tenderness or bleeding in the vagina  No vaginal discharge found  Lymphadenopathy:     She has no cervical adenopathy  No inguinal adenopathy noted on the right or left side

## 2020-02-05 NOTE — LETTER
February 5, 2020     Patient: Elmo Thakur   YOB: 1970   Date of Visit: 2/5/2020       To Whom It May Concern: It is my medical opinion that Adrian Graham will be having surgery and will need 2 weeks off   If you have any questions or concerns, please don't hesitate to call  Sincerely,        Robin ZHANG     CC: No Recipients

## 2020-02-10 ENCOUNTER — TELEPHONE (OUTPATIENT)
Dept: INTERNAL MEDICINE CLINIC | Age: 50
End: 2020-02-10

## 2020-02-10 ENCOUNTER — DOCUMENTATION (OUTPATIENT)
Dept: GYNECOLOGY | Facility: CLINIC | Age: 50
End: 2020-02-10

## 2020-02-10 NOTE — PROGRESS NOTES
L/m on pts phone about labs she needs,  orders given to pt about what labs she needs 7-10 days prior to procedure,  Also list of meds given to her about what   she needs to stop before surgery as well  PAT will go over all her meds Friday when they call her with the time of surgery

## 2020-02-10 NOTE — TELEPHONE ENCOUNTER
Spoke with Kenna at [employer] office and I asked about the medication issues the patient is wondering on what to stop  Also I stated about the pre op clearance due to her medical history  She stated that she will have Lauren Noval the RN to call me back on this issue

## 2020-02-10 NOTE — TELEPHONE ENCOUNTER
Patient is calling to find out that she is to stop the medications prior to having the hysterectomy done on 02/24/2020  They gave her a list of certain medications to stop taking prior to this surgery and not sure what to do  She called last Thursday and was asked if she needed a pre op clearance prior to going for the surgery and patient wasn't sure if she should have it done or not        Please call her back on the mobile

## 2020-02-13 ENCOUNTER — APPOINTMENT (OUTPATIENT)
Dept: LAB | Facility: HOSPITAL | Age: 50
End: 2020-02-13
Payer: COMMERCIAL

## 2020-02-13 DIAGNOSIS — Z01.812 ENCOUNTER FOR PRE-OPERATIVE LABORATORY TESTING: ICD-10-CM

## 2020-02-13 LAB
ABO GROUP BLD: NORMAL
BLD GP AB SCN SERPL QL: NEGATIVE
EST. AVERAGE GLUCOSE BLD GHB EST-MCNC: 88 MG/DL
HBA1C MFR BLD: 4.7 %
RH BLD: POSITIVE
SPECIMEN EXPIRATION DATE: NORMAL

## 2020-02-13 PROCEDURE — 86900 BLOOD TYPING SEROLOGIC ABO: CPT

## 2020-02-13 PROCEDURE — 86901 BLOOD TYPING SEROLOGIC RH(D): CPT

## 2020-02-13 PROCEDURE — 86850 RBC ANTIBODY SCREEN: CPT

## 2020-02-13 PROCEDURE — 83036 HEMOGLOBIN GLYCOSYLATED A1C: CPT

## 2020-02-13 PROCEDURE — 36415 COLL VENOUS BLD VENIPUNCTURE: CPT

## 2020-02-18 NOTE — PRE-PROCEDURE INSTRUCTIONS
Pre-Surgery Instructions:   Medication Instructions    acyclovir (ZOVIRAX) 5 % ointment Instructed patient per Anesthesia Guidelines   ALPRAZolam (XANAX) 0 5 mg tablet Instructed patient per Anesthesia Guidelines   amLODIPine (NORVASC) 10 mg tablet Instructed patient per Anesthesia Guidelines   ergocalciferol (VITAMIN D2) 50,000 units Instructed patient per Anesthesia Guidelines   fenofibrate (TRICOR) 48 mg tablet Instructed patient per Anesthesia Guidelines   fluticasone (FLONASE) 50 mcg/act nasal spray Instructed patient per Anesthesia Guidelines   ibuprofen (MOTRIN) 200 mg tablet Instructed patient per Anesthesia Guidelines   metoprolol succinate (TOPROL-XL) 50 mg 24 hr tablet Instructed patient per Anesthesia Guidelines   Multiple Vitamin (MULTI-VITAMIN DAILY PO) Instructed patient per Anesthesia Guidelines   spironolactone (ALDACTONE) 50 mg tablet Instructed patient per Anesthesia Guidelines  Instructed to take Metoprolol and may take a Xanax prn with sip of water the morning of surgery per anesthesia guidelines  No aspirin, NSAIDs, vitamins, or supplements 1 week before surgery

## 2020-02-19 DIAGNOSIS — I10 BENIGN ESSENTIAL HYPERTENSION: ICD-10-CM

## 2020-02-19 RX ORDER — SPIRONOLACTONE 50 MG/1
50 TABLET, FILM COATED ORAL DAILY
Qty: 90 TABLET | Refills: 1 | Status: SHIPPED | OUTPATIENT
Start: 2020-02-19 | End: 2020-08-01

## 2020-02-21 ENCOUNTER — ANESTHESIA EVENT (OUTPATIENT)
Dept: PERIOP | Facility: HOSPITAL | Age: 50
End: 2020-02-21
Payer: COMMERCIAL

## 2020-02-24 ENCOUNTER — HOSPITAL ENCOUNTER (OUTPATIENT)
Facility: HOSPITAL | Age: 50
Setting detail: OUTPATIENT SURGERY
Discharge: HOME | End: 2020-02-24
Attending: OBSTETRICS & GYNECOLOGY | Admitting: OBSTETRICS & GYNECOLOGY
Payer: COMMERCIAL

## 2020-02-24 ENCOUNTER — ANESTHESIA (OUTPATIENT)
Dept: PERIOP | Facility: HOSPITAL | Age: 50
End: 2020-02-24
Payer: COMMERCIAL

## 2020-02-24 VITALS
WEIGHT: 184 LBS | HEART RATE: 92 BPM | DIASTOLIC BLOOD PRESSURE: 67 MMHG | SYSTOLIC BLOOD PRESSURE: 119 MMHG | HEIGHT: 62 IN | OXYGEN SATURATION: 97 % | BODY MASS INDEX: 33.86 KG/M2 | RESPIRATION RATE: 16 BRPM | TEMPERATURE: 99.2 F

## 2020-02-24 DIAGNOSIS — N93.8 DYSFUNCTIONAL UTERINE BLEEDING: ICD-10-CM

## 2020-02-24 DIAGNOSIS — R52 PAIN: Primary | ICD-10-CM

## 2020-02-24 DIAGNOSIS — N80.0 UTERUS, ADENOMYOSIS: ICD-10-CM

## 2020-02-24 PROBLEM — Z90.710 STATUS POST LAPAROSCOPIC HYSTERECTOMY: Status: ACTIVE | Noted: 2020-02-24

## 2020-02-24 LAB
ABO GROUP BLD: NORMAL
EXT PREGNANCY TEST URINE: NEGATIVE
EXT. CONTROL: NORMAL
RH BLD: POSITIVE

## 2020-02-24 PROCEDURE — 86900 BLOOD TYPING SEROLOGIC ABO: CPT | Performed by: OBSTETRICS & GYNECOLOGY

## 2020-02-24 PROCEDURE — 88307 TISSUE EXAM BY PATHOLOGIST: CPT | Performed by: PATHOLOGY

## 2020-02-24 PROCEDURE — 81025 URINE PREGNANCY TEST: CPT | Performed by: OBSTETRICS & GYNECOLOGY

## 2020-02-24 PROCEDURE — 86901 BLOOD TYPING SEROLOGIC RH(D): CPT | Performed by: OBSTETRICS & GYNECOLOGY

## 2020-02-24 PROCEDURE — 58571 TLH W/T/O 250 G OR LESS: CPT | Performed by: OBSTETRICS & GYNECOLOGY

## 2020-02-24 RX ORDER — KETOROLAC TROMETHAMINE 30 MG/ML
INJECTION, SOLUTION INTRAMUSCULAR; INTRAVENOUS AS NEEDED
Status: DISCONTINUED | OUTPATIENT
Start: 2020-02-24 | End: 2020-02-24 | Stop reason: SURG

## 2020-02-24 RX ORDER — PROMETHAZINE HYDROCHLORIDE 25 MG/ML
12.5 INJECTION, SOLUTION INTRAMUSCULAR; INTRAVENOUS EVERY 4 HOURS PRN
Status: DISCONTINUED | OUTPATIENT
Start: 2020-02-24 | End: 2020-02-24 | Stop reason: HOSPADM

## 2020-02-24 RX ORDER — ONDANSETRON 2 MG/ML
4 INJECTION INTRAMUSCULAR; INTRAVENOUS EVERY 6 HOURS PRN
Status: DISCONTINUED | OUTPATIENT
Start: 2020-02-24 | End: 2020-02-24 | Stop reason: HOSPADM

## 2020-02-24 RX ORDER — HYDROMORPHONE HCL/PF 1 MG/ML
SYRINGE (ML) INJECTION AS NEEDED
Status: DISCONTINUED | OUTPATIENT
Start: 2020-02-24 | End: 2020-02-24 | Stop reason: SURG

## 2020-02-24 RX ORDER — MIDAZOLAM HYDROCHLORIDE 2 MG/2ML
INJECTION, SOLUTION INTRAMUSCULAR; INTRAVENOUS AS NEEDED
Status: DISCONTINUED | OUTPATIENT
Start: 2020-02-24 | End: 2020-02-24 | Stop reason: SURG

## 2020-02-24 RX ORDER — ACETAMINOPHEN 500 MG
500 TABLET ORAL EVERY 6 HOURS PRN
COMMUNITY
End: 2021-10-20 | Stop reason: ALTCHOICE

## 2020-02-24 RX ORDER — OXYCODONE HYDROCHLORIDE AND ACETAMINOPHEN 5; 325 MG/1; MG/1
1 TABLET ORAL EVERY 4 HOURS PRN
Qty: 15 TABLET | Refills: 0 | Status: SHIPPED | OUTPATIENT
Start: 2020-02-24 | End: 2020-03-05

## 2020-02-24 RX ORDER — FENTANYL CITRATE 50 UG/ML
INJECTION, SOLUTION INTRAMUSCULAR; INTRAVENOUS AS NEEDED
Status: DISCONTINUED | OUTPATIENT
Start: 2020-02-24 | End: 2020-02-24 | Stop reason: SURG

## 2020-02-24 RX ORDER — OXYCODONE HYDROCHLORIDE 5 MG/1
5 TABLET ORAL EVERY 4 HOURS PRN
Status: DISCONTINUED | OUTPATIENT
Start: 2020-02-24 | End: 2020-02-24 | Stop reason: HOSPADM

## 2020-02-24 RX ORDER — SODIUM CHLORIDE 9 MG/ML
125 INJECTION, SOLUTION INTRAVENOUS CONTINUOUS
Status: DISCONTINUED | OUTPATIENT
Start: 2020-02-24 | End: 2020-02-24 | Stop reason: HOSPADM

## 2020-02-24 RX ORDER — ONDANSETRON 2 MG/ML
INJECTION INTRAMUSCULAR; INTRAVENOUS AS NEEDED
Status: DISCONTINUED | OUTPATIENT
Start: 2020-02-24 | End: 2020-02-24 | Stop reason: SURG

## 2020-02-24 RX ORDER — GLYCOPYRROLATE 0.2 MG/ML
INJECTION INTRAMUSCULAR; INTRAVENOUS AS NEEDED
Status: DISCONTINUED | OUTPATIENT
Start: 2020-02-24 | End: 2020-02-24 | Stop reason: SURG

## 2020-02-24 RX ORDER — OXYCODONE HYDROCHLORIDE 5 MG/1
10 TABLET ORAL EVERY 4 HOURS PRN
Status: DISCONTINUED | OUTPATIENT
Start: 2020-02-24 | End: 2020-02-24 | Stop reason: HOSPADM

## 2020-02-24 RX ORDER — DEXAMETHASONE SODIUM PHOSPHATE 4 MG/ML
INJECTION, SOLUTION INTRA-ARTICULAR; INTRALESIONAL; INTRAMUSCULAR; INTRAVENOUS; SOFT TISSUE AS NEEDED
Status: DISCONTINUED | OUTPATIENT
Start: 2020-02-24 | End: 2020-02-24 | Stop reason: SURG

## 2020-02-24 RX ORDER — ROCURONIUM BROMIDE 10 MG/ML
INJECTION, SOLUTION INTRAVENOUS AS NEEDED
Status: DISCONTINUED | OUTPATIENT
Start: 2020-02-24 | End: 2020-02-24 | Stop reason: SURG

## 2020-02-24 RX ORDER — MAGNESIUM HYDROXIDE 1200 MG/15ML
LIQUID ORAL AS NEEDED
Status: DISCONTINUED | OUTPATIENT
Start: 2020-02-24 | End: 2020-02-24 | Stop reason: HOSPADM

## 2020-02-24 RX ORDER — FENTANYL CITRATE 50 UG/ML
25 INJECTION, SOLUTION INTRAMUSCULAR; INTRAVENOUS
Status: DISCONTINUED | OUTPATIENT
Start: 2020-02-24 | End: 2020-02-24 | Stop reason: HOSPADM

## 2020-02-24 RX ORDER — ACETAMINOPHEN 325 MG/1
975 TABLET ORAL EVERY 8 HOURS SCHEDULED
Status: DISCONTINUED | OUTPATIENT
Start: 2020-02-24 | End: 2020-02-24 | Stop reason: HOSPADM

## 2020-02-24 RX ORDER — HYDROMORPHONE HCL/PF 1 MG/ML
0.5 SYRINGE (ML) INJECTION
Status: DISCONTINUED | OUTPATIENT
Start: 2020-02-24 | End: 2020-02-24 | Stop reason: HOSPADM

## 2020-02-24 RX ORDER — NEOSTIGMINE METHYLSULFATE 1 MG/ML
INJECTION INTRAVENOUS AS NEEDED
Status: DISCONTINUED | OUTPATIENT
Start: 2020-02-24 | End: 2020-02-24 | Stop reason: SURG

## 2020-02-24 RX ORDER — SODIUM CHLORIDE 9 MG/ML
INJECTION, SOLUTION INTRAVENOUS CONTINUOUS PRN
Status: DISCONTINUED | OUTPATIENT
Start: 2020-02-24 | End: 2020-02-24 | Stop reason: SURG

## 2020-02-24 RX ORDER — PROPOFOL 10 MG/ML
INJECTION, EMULSION INTRAVENOUS AS NEEDED
Status: DISCONTINUED | OUTPATIENT
Start: 2020-02-24 | End: 2020-02-24 | Stop reason: SURG

## 2020-02-24 RX ADMIN — LIDOCAINE HYDROCHLORIDE 50 MG: 20 INJECTION, SOLUTION INTRAVENOUS at 13:08

## 2020-02-24 RX ADMIN — ONDANSETRON 4 MG: 2 INJECTION INTRAMUSCULAR; INTRAVENOUS at 15:04

## 2020-02-24 RX ADMIN — FENTANYL CITRATE 50 MCG: 50 INJECTION, SOLUTION INTRAMUSCULAR; INTRAVENOUS at 13:31

## 2020-02-24 RX ADMIN — DEXAMETHASONE SODIUM PHOSPHATE 4 MG: 4 INJECTION, SOLUTION INTRAMUSCULAR; INTRAVENOUS at 13:31

## 2020-02-24 RX ADMIN — FENTANYL CITRATE 25 MCG: 50 INJECTION, SOLUTION INTRAMUSCULAR; INTRAVENOUS at 16:23

## 2020-02-24 RX ADMIN — ROCURONIUM BROMIDE 50 MG: 50 INJECTION, SOLUTION INTRAVENOUS at 13:09

## 2020-02-24 RX ADMIN — GLYCOPYRROLATE 0.4 MG: 0.2 INJECTION INTRAMUSCULAR; INTRAVENOUS at 15:07

## 2020-02-24 RX ADMIN — NEOSTIGMINE METHYLSULFATE 3 MG: 1 INJECTION, SOLUTION INTRAVENOUS at 15:07

## 2020-02-24 RX ADMIN — MIDAZOLAM 2 MG: 1 INJECTION INTRAMUSCULAR; INTRAVENOUS at 12:59

## 2020-02-24 RX ADMIN — MIDAZOLAM 2 MG: 1 INJECTION INTRAMUSCULAR; INTRAVENOUS at 13:02

## 2020-02-24 RX ADMIN — FENTANYL CITRATE 25 MCG: 50 INJECTION, SOLUTION INTRAMUSCULAR; INTRAVENOUS at 16:06

## 2020-02-24 RX ADMIN — ONDANSETRON 4 MG: 2 INJECTION INTRAMUSCULAR; INTRAVENOUS at 16:41

## 2020-02-24 RX ADMIN — SODIUM CHLORIDE: 0.9 INJECTION, SOLUTION INTRAVENOUS at 13:13

## 2020-02-24 RX ADMIN — FENTANYL CITRATE 50 MCG: 50 INJECTION, SOLUTION INTRAMUSCULAR; INTRAVENOUS at 13:19

## 2020-02-24 RX ADMIN — PROPOFOL 200 MG: 10 INJECTION, EMULSION INTRAVENOUS at 13:08

## 2020-02-24 RX ADMIN — KETOROLAC TROMETHAMINE 30 MG: 30 INJECTION, SOLUTION INTRAMUSCULAR at 15:13

## 2020-02-24 RX ADMIN — Medication 2000 MG: at 13:00

## 2020-02-24 RX ADMIN — HYDROMORPHONE HYDROCHLORIDE 0.5 MG: 1 INJECTION, SOLUTION INTRAMUSCULAR; INTRAVENOUS; SUBCUTANEOUS at 14:22

## 2020-02-24 RX ADMIN — SODIUM CHLORIDE 125 ML/HR: 0.9 INJECTION, SOLUTION INTRAVENOUS at 15:57

## 2020-02-24 RX ADMIN — FENTANYL CITRATE 100 MCG: 50 INJECTION, SOLUTION INTRAMUSCULAR; INTRAVENOUS at 13:08

## 2020-02-24 RX ADMIN — SODIUM CHLORIDE 125 ML/HR: 0.9 INJECTION, SOLUTION INTRAVENOUS at 09:35

## 2020-02-24 NOTE — INTERVAL H&P NOTE
H&P reviewed  After examining the patient I find no changes in the patients condition since the H&P had been written  CV: RRR  Lungs: CTA    There were no vitals filed for this visit

## 2020-02-24 NOTE — ANESTHESIA PREPROCEDURE EVALUATION
Review of Systems/Medical History  Patient summary reviewed  Chart reviewed      Cardiovascular  Exercise tolerance (METS): >4,  Hyperlipidemia, Hypertension controlled,    Pulmonary  Sleep apnea ,        GI/Hepatic  Negative GI/hepatic ROS          Negative  ROS        Endo/Other    Obesity    GYN  Negative gynecology ROS          Hematology  Anemia ,     Musculoskeletal  Negative musculoskeletal ROS        Neurology  Negative neurology ROS      Psychology   Anxiety,              Physical Exam    Airway    Mallampati score: II  TM Distance: >3 FB  Neck ROM: full     Dental       Cardiovascular  Rhythm: regular, Rate: normal,     Pulmonary  Breath sounds clear to auscultation,     Other Findings        Anesthesia Plan  ASA Score- 2     Anesthesia Type- general with ASA Monitors  Additional Monitors:   Airway Plan:         Plan Factors- Patient instructed to abstain from smoking on day of procedure  Patient did not smoke on day of surgery  Induction- intravenous  Postoperative Plan- Plan for postoperative opioid use  Planned trial extubation    Informed Consent- Anesthetic plan and risks discussed with patient

## 2020-02-24 NOTE — OP NOTE
OPERATIVE REPORT  PATIENT NAME: Mariola Dillard    :  1970  MRN: [id number]  Pt Location: [facility] OR ROOM 01    SURGERY DATE: 2020    Surgeon(s) and Role:     Cyndi Bradley,  - Primary     * Fanta Vanessa MD - Assisting    Preop Diagnosis:  Uterus, adenomyosis [N80 0]  Dysfunctional uterine bleeding [N93 8]    Post-Op Diagnosis Codes:     * Uterus, adenomyosis [N80 0]     * Dysfunctional uterine bleeding [N93 8]    Procedure(s) (LRB):  HYSTERECTOMY LAPAROSCOPIC TOTAL (LTH) WITH B/L SALPINGECTOMY (N/A)  CYSTOSCOPY (N/A)    Specimen(s):  ID Type Source Tests Collected by Time Destination   1 : Uterus, cervix, bilateral fallopian tubes Tissue Uterus TISSUE Marzena Lyle DO 2020 1501        Estimated Blood Loss:   50 mL    Drains:  [REMOVED] Urethral Catheter Double-lumen;Non-latex 16 Fr  (Removed)   Number of days: 0       Anesthesia Type:   General    Operative Indications:  Uterus, adenomyosis [N80 0]  Dysfunctional uterine bleeding [N93 8]      Operative Findings:  1  Normal appearing external genitalia  2  Cervix without lesion   3  Uterus sounded to 9 cm, normal size and consistency  4  Omental adhesions to the superior anterior abdominal wall  5  Vesicouterine adhesions localized to the midline  6  Normal appearing bilateral ovaries, evidence of bilateral tubal ligation     Complications:   None    Procedure and Technique:  Brief History  All risks, benefits, and alternatives to the procedure were discussed with the patient and she had the opportunity to ask questions  Informed consent was obtained  Description of Procedure  Patient was taken to the operating room were a time out was performed to confirm correct patient and correct procedure  General endotracheal anesthesia (GET) was administered and the patient was positioned on the OR table in the dorsal lithotomy position  She received 2g Ancef for prophylaxis    Arms were positioned in a neutral position at the sides and secured by tucking a draw sheet  All pressure points were padded and a tay hugger was placed to maintain control of core body temperature  A bimanual exam was performed and the uterus was noted to be anteverted, normal in size and consistency with no palpable adnexal masses or fullness  The patient was prepped and draped in the usual sterile fashion with chloroprep on the abdomen and CBD prep on the vagina and perineum  Operative Technique  A weighted speculum was inserted into the vagina and a [place] retractor was used to visualize the anterior lip of the cervix, which was then grasped with a single toothed tenaculum  Under direct visualization the uterus was sounded to approximately 8 cm  Cervix was dilated for introduction of the DAVID uterine manipulator  DAVID was secured in place with a stitch of 0 Vicryl  Amin catheter was placed and the intravaginal occluder balloon was inflated  Sterile gloves were exchanged and attention was turned to the abdomen  Next, the deepest part of the umbilicus was grasped and everted with an Allis clamp  The edges of the umbilicus were grasped with 2 towel clamps to elevate the abdominal wall away from the abdominal organs and vessels  A Veress needle was gently inserted into the umbilicus at a 45 degree angle  Once entry into the abdominal cavity was confirmed, the abdomen was insufflated with CO2 gas to 15 mmHg  Next, a 10 mm diagnostic laparoscope was inserted via direct entry into the umbilicus  The patient was then placed in Trendelenburg for better visualization of the pelvis  Next, a 5 mm trocar was inserted into the upper right lateral quadrant under direct visualization and another 5 mm trocar at palmers point  The superior omental adhesions were taken down with the Ligasure device without complication  Attention was then turned to the lower pelvis and two additional trocars were placed in the right and left lateral lower quadrants      After placement of the left lateral trocar additional adhesiolysis was performed in order to gain access to the left adnexa  The left fallopian tube was grasped at its fimbriated end and elevated to visualize the mesosalpinx  The Ligasure device was used to ligate along the mesosalpinx, working proximally and taking care to avoid ovarian vasculature  Attention was then turned to the contralateral tube, which was amputated in similar fashion  The utero-ovarian ligament was identified, sealed, and transected with the Ligasure device  The round ligaments were cauterized and divided  Dense scar tissue was encountered with development of the bladder flap  The vesicouterine peritoneal reflection was divided using the Ligasure and the bladder was mobilized anteriorly  The uterine vessels were cauterized and divided bilaterally  The cardinal ligaments were cauterized and divided bilaterally down to the level of the manipulator cup  At this point, a circumferential colpotomy was made using electrocautery  The specimen was removed through the vagina  The vaginal cuff was closed vaginally using 0-Vicryl  A figure of eight was placed at each angle  The remainder of the cuff was closed with a separate running stitch  Airtight closure and excellent hemostasis was obtained  Copious irrigation was used and hemostasis was confirmed at low intraperitoneal pressures  All trocars were removed and pneumoperitoneum was completely released with the assistance of Valsalva maneuvers  The skin at all port sites was closed using a subcuticular stitch  Vaginal exam revealed excellent closure and hemostasis  The Amin catheter was removed  The bladder was filled in retrograde fashion with approximately 300 mL of normal saline  Using the 5 mm cystoscope, cystoscopy was performed and the above-mentioned findings were noted  The bladder was emptied with a Amin catheter which was removed at the completion of the procedure      At the conclusion of the procedure, all needle, sponge, and instrument counts were noted to be correct x2  Patient tolerated the procedure well and was transferred to PACU in stable condition  Dr Zoraida Mojica was present and participated in all key portions of the case      Patient Disposition:  PACU     SIGNATURE: Isaias Quintero MD  DATE: February 24, 2020  TIME: 3:09 PM

## 2020-02-24 NOTE — DISCHARGE INSTRUCTIONS
Laparoscopic Hysterectomy   WHAT YOU SHOULD KNOW:   Laparoscopic hysterectomy [facility]) is surgery to remove your uterus only (partial hysterectomy), or your uterus and cervix (total hysterectomy)  Other organs, such as your ovaries and fallopian tubes, may also be removed  AFTER YOU LEAVE:   Medicines:   · Medicines  may be given to decrease pain or to treat or prevent a bacterial infection  Ask your primary healthcare provider [facility]) how to take prescription pain medicine safely  You may also need to take hormone medicine, such as estrogen  · Take your medicine as directed  Contact your PHP if you think your medicine is not helping or if you have side effects  Tell him if you are allergic to any medicine  Keep a list of the medicines, vitamins, and herbs you take  Include the amounts, and when and why you take them  Bring the list or the pill bottles to follow-up visits  Carry your medicine list with you in case of an emergency  Activity guidelines: You may feel like resting more after surgery  Slowly start to do more each day  Rest when you feel it is needed  Ask when you can return to your usual activities  What to expect after surgery:   · Ask your gynecologist or PHP about routine tests that you will need  · It is normal to have some bleeding from your vagina after certain types of hysterectomy surgery  Ask your gynecologist or PHP if you should expect bleeding, and how much bleeding to expect  Contact your gynecologist or PHP if:   · You have a fever  · You have pain that gets worse or does not decrease or go away with treatment  · You notice pus or a foul-smelling drainage coming from an incision, or it is red or swollen  · You have new or more bright red bleeding from your vagina or your incisions  · You have yellow, green, or foul-smelling discharge coming from your vagina  · You have trouble urinating, burning when you urinate, or feel a need to urinate often      · You have trouble having a bowel movement  · Your skin is itchy, swollen, or has a rash  · You have questions or concerns about your condition or care  Seek care immediately or call 911 if:   · You are bleeding from your vagina, or bleeding more than you were told to expect  · Your arm or leg feels warm, tender, and painful  It may look swollen and red  · You feel lightheaded, short of breath, and have chest pain  · You cough up blood  © 2014 [address] is for End User's use only and may not be sold, redistributed or otherwise used for commercial purposes  All illustrations and images included in CareNotes® are the copyrighted property of A D A M , Inc  or Anil Russell  The above information is an  only  It is not intended as medical advice for individual conditions or treatments  Talk to your doctor, nurse or pharmacist before following any medical regimen to see if it is safe and effective for you

## 2020-02-24 NOTE — ANESTHESIA POSTPROCEDURE EVALUATION
Post-Op Assessment Note    CV Status:  Stable    Pain management: adequate     Mental Status:  Alert and awake   Hydration Status:  Euvolemic   PONV Controlled:  Controlled   Airway Patency:  Patent  Airway: intubated   Post Op Vitals Reviewed: Yes      Staff: Anesthesiologist, CRNA           BP      Temp      Pulse     Resp      SpO2

## 2020-03-04 ENCOUNTER — OFFICE VISIT (OUTPATIENT)
Dept: GYNECOLOGY | Facility: CLINIC | Age: 50
End: 2020-03-04

## 2020-03-04 VITALS
HEIGHT: 62 IN | BODY MASS INDEX: 33.65 KG/M2 | HEART RATE: 90 BPM | DIASTOLIC BLOOD PRESSURE: 80 MMHG | SYSTOLIC BLOOD PRESSURE: 122 MMHG

## 2020-03-04 DIAGNOSIS — Z48.89 POSTOPERATIVE VISIT: Primary | ICD-10-CM

## 2020-03-04 PROCEDURE — 3079F DIAST BP 80-89 MM HG: CPT | Performed by: OBSTETRICS & GYNECOLOGY

## 2020-03-04 PROCEDURE — 99024 POSTOP FOLLOW-UP VISIT: CPT | Performed by: OBSTETRICS & GYNECOLOGY

## 2020-03-04 PROCEDURE — 3074F SYST BP LT 130 MM HG: CPT | Performed by: OBSTETRICS & GYNECOLOGY

## 2020-03-04 NOTE — PROGRESS NOTES
Patient presents to the office for postoperative check  She is status post T LH BS on February 24th for abnormal uterine bleeding failed ablation adenomyosis  Pathology report revealed adenomyosis  Otherwise benign tissue    Physical exam:  Her abdominal port sites are healing well with no erythema or exudate      Impression:  Postoperative check    Plan:  Return to the office in 1 month for 2nd postop check

## 2020-03-08 ENCOUNTER — TELEPHONE (OUTPATIENT)
Dept: OTHER | Facility: OTHER | Age: 50
End: 2020-03-08

## 2020-03-08 DIAGNOSIS — I10 HYPERTENSION, UNSPECIFIED TYPE: ICD-10-CM

## 2020-03-08 RX ORDER — METOPROLOL SUCCINATE 50 MG/1
TABLET, EXTENDED RELEASE ORAL
Qty: 90 TABLET | Refills: 1 | Status: SHIPPED | OUTPATIENT
Start: 2020-03-08 | End: 2020-09-10 | Stop reason: SDUPTHER

## 2020-03-08 NOTE — TELEPHONE ENCOUNTER
Pt supposed to go to work tomorrow but is bleeding more & wonders if this is common and or if she should be concerned

## 2020-03-09 ENCOUNTER — OFFICE VISIT (OUTPATIENT)
Dept: GYNECOLOGY | Facility: CLINIC | Age: 50
End: 2020-03-09

## 2020-03-09 DIAGNOSIS — N93.9 VAGINAL BLEEDING: Primary | ICD-10-CM

## 2020-03-09 PROCEDURE — 99024 POSTOP FOLLOW-UP VISIT: CPT | Performed by: OBSTETRICS & GYNECOLOGY

## 2020-03-09 PROCEDURE — 3079F DIAST BP 80-89 MM HG: CPT | Performed by: OBSTETRICS & GYNECOLOGY

## 2020-03-09 PROCEDURE — 3074F SYST BP LT 130 MM HG: CPT | Performed by: OBSTETRICS & GYNECOLOGY

## 2020-03-09 NOTE — PROGRESS NOTES
Assessment/Plan:    Patient instructed on what to expect after monsels  She will continue to monitor bleeding and call with any concerns  Diagnoses and all orders for this visit:    Vaginal bleeding        Subjective:      Patient ID: Mariola Dillard is a 52 y o  female  Patient presents with vaginal bleeding  She is s/p TLH B/L salpingectomy 2/24/20  She was seen for post op visit on 3/4/20 and was doing well  Patient states she began having increased bleeding 2 days ago with bright red bleeding with wiping and on pad last night  She admits to lifting during grocery shopping the day before, otherwise no significant activity  Bleeding has lessened this morning  Denies any pain, discharge  The following portions of the patient's history were reviewed and updated as appropriate: allergies, current medications, past family history, past medical history, past social history, past surgical history and problem list     Review of Systems      Objective: There were no vitals taken for this visit  Physical Exam   Constitutional: She appears well-developed and well-nourished  HENT:   Head: Normocephalic and atraumatic  Genitourinary: There is no rash, tenderness, lesion or injury on the right labia  There is no rash, tenderness, lesion or injury on the left labia  There is bleeding (minimal amount of bleeding noted, with slow capillary ooze noted and cauterized with monsels/ incision intact) in the vagina  No tenderness in the vagina  No foreign body in the vagina  No signs of injury around the vagina  No vaginal discharge found  Nursing note and vitals reviewed

## 2020-03-23 DIAGNOSIS — I10 ESSENTIAL HYPERTENSION: ICD-10-CM

## 2020-03-23 DIAGNOSIS — F41.9 ANXIETY: ICD-10-CM

## 2020-03-23 NOTE — TELEPHONE ENCOUNTER
Pt is requesting refill on Amlodipine and also requesting refill on xanax  Pt is requesting that the qty for the xanax be placed at 60 instead of 40 because she is taking them 2 times a day now  Does have 2 more refills but wants qty changed    Last OV:01/21/2020  Future OV:06/05/2020

## 2020-03-24 RX ORDER — AMLODIPINE BESYLATE 10 MG/1
10 TABLET ORAL EVERY EVENING
Qty: 90 TABLET | Refills: 1 | Status: SHIPPED | OUTPATIENT
Start: 2020-03-24 | End: 2020-10-05 | Stop reason: SDUPTHER

## 2020-03-24 RX ORDER — ALPRAZOLAM 0.5 MG/1
0.5 TABLET ORAL
Qty: 60 TABLET | Refills: 3 | Status: SHIPPED | OUTPATIENT
Start: 2020-03-24 | End: 2020-10-12 | Stop reason: SDUPTHER

## 2020-04-01 ENCOUNTER — TELEMEDICINE (OUTPATIENT)
Dept: GYNECOLOGY | Facility: CLINIC | Age: 50
End: 2020-04-01

## 2020-04-01 DIAGNOSIS — Z48.89 POSTOPERATIVE VISIT: Primary | ICD-10-CM

## 2020-04-01 PROCEDURE — 3079F DIAST BP 80-89 MM HG: CPT | Performed by: OBSTETRICS & GYNECOLOGY

## 2020-04-01 PROCEDURE — 99024 POSTOP FOLLOW-UP VISIT: CPT | Performed by: OBSTETRICS & GYNECOLOGY

## 2020-04-01 PROCEDURE — 3074F SYST BP LT 130 MM HG: CPT | Performed by: OBSTETRICS & GYNECOLOGY

## 2020-06-04 ENCOUNTER — TRANSCRIBE ORDERS (OUTPATIENT)
Dept: ADMINISTRATIVE | Age: 50
End: 2020-06-04

## 2020-06-04 ENCOUNTER — APPOINTMENT (OUTPATIENT)
Dept: LAB | Age: 50
End: 2020-06-04
Payer: COMMERCIAL

## 2020-06-04 DIAGNOSIS — E55.9 VITAMIN D DEFICIENCY DISEASE: ICD-10-CM

## 2020-06-04 DIAGNOSIS — E55.9 VITAMIN D DEFICIENCY: ICD-10-CM

## 2020-06-04 DIAGNOSIS — E78.1 PURE HYPERGLYCERIDEMIA: Primary | ICD-10-CM

## 2020-06-04 DIAGNOSIS — E78.1 PURE HYPERGLYCERIDEMIA: ICD-10-CM

## 2020-06-04 LAB
25(OH)D3 SERPL-MCNC: 56.1 NG/ML (ref 30–100)
ALBUMIN SERPL BCP-MCNC: 4.7 G/DL (ref 3.5–5)
ALP SERPL-CCNC: 48 U/L (ref 46–116)
ALT SERPL W P-5'-P-CCNC: 96 U/L (ref 12–78)
ANION GAP SERPL CALCULATED.3IONS-SCNC: 4 MMOL/L (ref 4–13)
AST SERPL W P-5'-P-CCNC: 57 U/L (ref 5–45)
BILIRUB SERPL-MCNC: 0.85 MG/DL (ref 0.2–1)
BUN SERPL-MCNC: 12 MG/DL (ref 5–25)
CALCIUM SERPL-MCNC: 10 MG/DL (ref 8.3–10.1)
CHLORIDE SERPL-SCNC: 108 MMOL/L (ref 100–108)
CHOLEST SERPL-MCNC: 146 MG/DL (ref 50–200)
CO2 SERPL-SCNC: 26 MMOL/L (ref 21–32)
CREAT SERPL-MCNC: 0.99 MG/DL (ref 0.6–1.3)
GFR SERPL CREATININE-BSD FRML MDRD: 67 ML/MIN/1.73SQ M
GLUCOSE P FAST SERPL-MCNC: 115 MG/DL (ref 65–99)
HDLC SERPL-MCNC: 39 MG/DL
LDLC SERPL CALC-MCNC: 50 MG/DL (ref 0–100)
NONHDLC SERPL-MCNC: 107 MG/DL
POTASSIUM SERPL-SCNC: 4.5 MMOL/L (ref 3.5–5.3)
PROT SERPL-MCNC: 7.7 G/DL (ref 6.4–8.2)
SODIUM SERPL-SCNC: 138 MMOL/L (ref 136–145)
TRIGL SERPL-MCNC: 285 MG/DL

## 2020-06-04 PROCEDURE — 82306 VITAMIN D 25 HYDROXY: CPT

## 2020-06-04 PROCEDURE — 80061 LIPID PANEL: CPT

## 2020-06-04 PROCEDURE — 36415 COLL VENOUS BLD VENIPUNCTURE: CPT

## 2020-06-04 PROCEDURE — 80053 COMPREHEN METABOLIC PANEL: CPT

## 2020-06-05 ENCOUNTER — OFFICE VISIT (OUTPATIENT)
Dept: INTERNAL MEDICINE CLINIC | Facility: CLINIC | Age: 50
End: 2020-06-05
Payer: COMMERCIAL

## 2020-06-05 VITALS
SYSTOLIC BLOOD PRESSURE: 126 MMHG | OXYGEN SATURATION: 98 % | TEMPERATURE: 98.7 F | HEART RATE: 85 BPM | DIASTOLIC BLOOD PRESSURE: 92 MMHG

## 2020-06-05 DIAGNOSIS — E78.1 ESSENTIAL HYPERTRIGLYCERIDEMIA: ICD-10-CM

## 2020-06-05 DIAGNOSIS — R73.09 ABNORMAL GLUCOSE: ICD-10-CM

## 2020-06-05 DIAGNOSIS — E55.9 VITAMIN D DEFICIENCY: ICD-10-CM

## 2020-06-05 DIAGNOSIS — Z90.710 STATUS POST LAPAROSCOPIC HYSTERECTOMY: ICD-10-CM

## 2020-06-05 DIAGNOSIS — R63.5 WEIGHT GAIN: ICD-10-CM

## 2020-06-05 DIAGNOSIS — E78.00 HYPERCHOLESTEREMIA: ICD-10-CM

## 2020-06-05 DIAGNOSIS — I10 BENIGN ESSENTIAL HYPERTENSION: Primary | ICD-10-CM

## 2020-06-05 DIAGNOSIS — F41.1 GENERALIZED ANXIETY DISORDER: ICD-10-CM

## 2020-06-05 PROCEDURE — 1036F TOBACCO NON-USER: CPT | Performed by: FAMILY MEDICINE

## 2020-06-05 PROCEDURE — 99214 OFFICE O/P EST MOD 30 MIN: CPT | Performed by: FAMILY MEDICINE

## 2020-06-05 PROCEDURE — 3074F SYST BP LT 130 MM HG: CPT | Performed by: FAMILY MEDICINE

## 2020-06-05 PROCEDURE — 3080F DIAST BP >= 90 MM HG: CPT | Performed by: FAMILY MEDICINE

## 2020-06-05 RX ORDER — FENOFIBRATE 48 MG/1
48 TABLET, COATED ORAL
Qty: 90 TABLET | Refills: 1 | Status: SHIPPED | OUTPATIENT
Start: 2020-06-05 | End: 2021-01-15 | Stop reason: SDUPTHER

## 2020-06-08 ENCOUNTER — TELEPHONE (OUTPATIENT)
Dept: INTERNAL MEDICINE CLINIC | Age: 50
End: 2020-06-08

## 2020-06-26 DIAGNOSIS — E55.9 VITAMIN D DEFICIENCY: ICD-10-CM

## 2020-06-29 RX ORDER — ERGOCALCIFEROL 1.25 MG/1
CAPSULE ORAL
Qty: 12 CAPSULE | Refills: 1 | Status: SHIPPED | OUTPATIENT
Start: 2020-06-29 | End: 2021-01-11

## 2020-07-13 ENCOUNTER — TELEPHONE (OUTPATIENT)
Dept: INTERNAL MEDICINE CLINIC | Age: 50
End: 2020-07-13

## 2020-07-13 NOTE — TELEPHONE ENCOUNTER
Patient has been referred before to Colorectal surgery  Dr Amanda Marina or Dr Janette Harrison today in [facility]    Thank you

## 2020-07-13 NOTE — TELEPHONE ENCOUNTER
Dr Dilip Echeverria,    Patient would like to have the order colonscopy since she told 48years old      She would like to have the order and also a good one to take her     Please call her back at the mobile phone

## 2020-07-14 DIAGNOSIS — Z12.11 SCREENING FOR COLON CANCER: Primary | ICD-10-CM

## 2020-07-14 NOTE — TELEPHONE ENCOUNTER
I don't have the orders in her chart  Please put the order in her chart for me to mail    I have the one for her  but nothing for her

## 2020-07-27 ENCOUNTER — TELEPHONE (OUTPATIENT)
Dept: INTERNAL MEDICINE CLINIC | Age: 50
End: 2020-07-27

## 2020-07-27 NOTE — TELEPHONE ENCOUNTER
Medication last filled 6/5/2020 90 day supply with 1 refill     Pt needs to contact pharmacy for refill       Message left with pt daughter, who came into office to inquire about medication

## 2020-07-27 NOTE — TELEPHONE ENCOUNTER
This patient called the office today and needs a refill of Fenobirate and the pharmacy is [facility] on [address]

## 2020-07-30 DIAGNOSIS — I10 BENIGN ESSENTIAL HYPERTENSION: ICD-10-CM

## 2020-08-01 RX ORDER — SPIRONOLACTONE 50 MG/1
TABLET, FILM COATED ORAL
Qty: 90 TABLET | Refills: 1 | Status: SHIPPED | OUTPATIENT
Start: 2020-08-01 | End: 2021-02-22 | Stop reason: SDUPTHER

## 2020-09-10 DIAGNOSIS — I10 HYPERTENSION, UNSPECIFIED TYPE: ICD-10-CM

## 2020-09-10 RX ORDER — METOPROLOL SUCCINATE 50 MG/1
50 TABLET, EXTENDED RELEASE ORAL DAILY
Qty: 90 TABLET | Refills: 1 | Status: SHIPPED | OUTPATIENT
Start: 2020-09-10 | End: 2021-03-09 | Stop reason: SDUPTHER

## 2020-09-17 DIAGNOSIS — I10 ESSENTIAL HYPERTENSION: ICD-10-CM

## 2020-09-17 RX ORDER — AMLODIPINE BESYLATE 10 MG/1
TABLET ORAL
Qty: 90 TABLET | Refills: 1 | OUTPATIENT
Start: 2020-09-17

## 2020-09-28 ENCOUNTER — TRANSCRIBE ORDERS (OUTPATIENT)
Dept: ADMINISTRATIVE | Facility: HOSPITAL | Age: 50
End: 2020-09-28

## 2020-10-01 ENCOUNTER — APPOINTMENT (OUTPATIENT)
Dept: LAB | Facility: HOSPITAL | Age: 50
End: 2020-10-01
Payer: COMMERCIAL

## 2020-10-01 DIAGNOSIS — E78.00 HYPERCHOLESTEREMIA: ICD-10-CM

## 2020-10-01 DIAGNOSIS — R73.09 ABNORMAL GLUCOSE: ICD-10-CM

## 2020-10-01 LAB
ALBUMIN SERPL BCP-MCNC: 4.3 G/DL (ref 3.5–5)
ALP SERPL-CCNC: 49 U/L (ref 46–116)
ALT SERPL W P-5'-P-CCNC: <6 U/L (ref 12–78)
ANION GAP SERPL CALCULATED.3IONS-SCNC: 7 MMOL/L (ref 4–13)
AST SERPL W P-5'-P-CCNC: 18 U/L (ref 5–45)
BILIRUB SERPL-MCNC: 0.8 MG/DL (ref 0.2–1)
BUN SERPL-MCNC: 20 MG/DL (ref 5–25)
CALCIUM SERPL-MCNC: 9.6 MG/DL (ref 8.3–10.1)
CHLORIDE SERPL-SCNC: 103 MMOL/L (ref 100–108)
CHOLEST SERPL-MCNC: 131 MG/DL (ref 50–200)
CO2 SERPL-SCNC: 28 MMOL/L (ref 21–32)
CREAT SERPL-MCNC: 0.91 MG/DL (ref 0.6–1.3)
GFR SERPL CREATININE-BSD FRML MDRD: 74 ML/MIN/1.73SQ M
GLUCOSE P FAST SERPL-MCNC: 102 MG/DL (ref 65–99)
HDLC SERPL-MCNC: 45 MG/DL
LDLC SERPL CALC-MCNC: 60 MG/DL (ref 0–100)
NONHDLC SERPL-MCNC: 86 MG/DL
POTASSIUM SERPL-SCNC: 4.1 MMOL/L (ref 3.5–5.3)
PROT SERPL-MCNC: 7.5 G/DL (ref 6.4–8.2)
SODIUM SERPL-SCNC: 138 MMOL/L (ref 136–145)
TRIGL SERPL-MCNC: 130 MG/DL

## 2020-10-01 PROCEDURE — 80061 LIPID PANEL: CPT

## 2020-10-01 PROCEDURE — 36415 COLL VENOUS BLD VENIPUNCTURE: CPT

## 2020-10-01 PROCEDURE — 80053 COMPREHEN METABOLIC PANEL: CPT

## 2020-10-05 ENCOUNTER — OFFICE VISIT (OUTPATIENT)
Dept: INTERNAL MEDICINE CLINIC | Facility: CLINIC | Age: 50
End: 2020-10-05
Payer: COMMERCIAL

## 2020-10-05 VITALS
BODY MASS INDEX: 32.39 KG/M2 | OXYGEN SATURATION: 97 % | TEMPERATURE: 99.2 F | HEART RATE: 89 BPM | WEIGHT: 176 LBS | HEIGHT: 62 IN | DIASTOLIC BLOOD PRESSURE: 88 MMHG | SYSTOLIC BLOOD PRESSURE: 128 MMHG

## 2020-10-05 DIAGNOSIS — F41.1 GENERALIZED ANXIETY DISORDER: ICD-10-CM

## 2020-10-05 DIAGNOSIS — E78.1 ESSENTIAL HYPERTRIGLYCERIDEMIA: ICD-10-CM

## 2020-10-05 DIAGNOSIS — M20.42 HAMMER TOES OF BOTH FEET: ICD-10-CM

## 2020-10-05 DIAGNOSIS — Z23 NEED FOR DIPHTHERIA-TETANUS-PERTUSSIS (TDAP) VACCINE: ICD-10-CM

## 2020-10-05 DIAGNOSIS — I10 ESSENTIAL HYPERTENSION: ICD-10-CM

## 2020-10-05 DIAGNOSIS — E55.9 VITAMIN D DEFICIENCY: ICD-10-CM

## 2020-10-05 DIAGNOSIS — I10 BENIGN ESSENTIAL HYPERTENSION: ICD-10-CM

## 2020-10-05 DIAGNOSIS — Z12.31 ENCOUNTER FOR SCREENING MAMMOGRAM FOR MALIGNANT NEOPLASM OF BREAST: Primary | ICD-10-CM

## 2020-10-05 DIAGNOSIS — M20.41 HAMMER TOES OF BOTH FEET: ICD-10-CM

## 2020-10-05 PROBLEM — R63.5 WEIGHT GAIN: Status: RESOLVED | Noted: 2018-02-13 | Resolved: 2020-10-05

## 2020-10-05 PROCEDURE — 90715 TDAP VACCINE 7 YRS/> IM: CPT

## 2020-10-05 PROCEDURE — 1036F TOBACCO NON-USER: CPT | Performed by: FAMILY MEDICINE

## 2020-10-05 PROCEDURE — 3079F DIAST BP 80-89 MM HG: CPT | Performed by: FAMILY MEDICINE

## 2020-10-05 PROCEDURE — 99214 OFFICE O/P EST MOD 30 MIN: CPT | Performed by: FAMILY MEDICINE

## 2020-10-05 PROCEDURE — 90471 IMMUNIZATION ADMIN: CPT

## 2020-10-05 RX ORDER — AMLODIPINE BESYLATE 10 MG/1
10 TABLET ORAL EVERY EVENING
Qty: 90 TABLET | Refills: 1 | Status: SHIPPED | OUTPATIENT
Start: 2020-10-05 | End: 2021-04-19 | Stop reason: DRUGHIGH

## 2020-10-12 DIAGNOSIS — F41.9 ANXIETY: ICD-10-CM

## 2020-10-12 RX ORDER — ALPRAZOLAM 0.5 MG/1
0.5 TABLET ORAL
Qty: 60 TABLET | Refills: 0 | Status: SHIPPED | OUTPATIENT
Start: 2020-10-12 | End: 2020-12-07 | Stop reason: SDUPTHER

## 2020-11-05 ENCOUNTER — LAB REQUISITION (OUTPATIENT)
Dept: LAB | Facility: HOSPITAL | Age: 50
End: 2020-11-05
Payer: COMMERCIAL

## 2020-11-05 DIAGNOSIS — Z12.11 ENCOUNTER FOR SCREENING FOR MALIGNANT NEOPLASM OF COLON: ICD-10-CM

## 2020-11-05 DIAGNOSIS — K57.30 DIVERTICULOSIS OF LARGE INTESTINE WITHOUT PERFORATION OR ABSCESS WITHOUT BLEEDING: ICD-10-CM

## 2020-11-05 DIAGNOSIS — K63.5 POLYP OF COLON: ICD-10-CM

## 2020-11-05 PROCEDURE — 88305 TISSUE EXAM BY PATHOLOGIST: CPT | Performed by: PATHOLOGY

## 2020-11-24 DIAGNOSIS — Z12.39 BREAST CANCER SCREENING: ICD-10-CM

## 2020-12-07 DIAGNOSIS — F41.9 ANXIETY: ICD-10-CM

## 2020-12-07 RX ORDER — ALPRAZOLAM 0.5 MG/1
0.5 TABLET ORAL
Qty: 60 TABLET | Refills: 0 | Status: SHIPPED | OUTPATIENT
Start: 2020-12-07 | End: 2021-01-26 | Stop reason: SDUPTHER

## 2021-01-11 DIAGNOSIS — E55.9 VITAMIN D DEFICIENCY: ICD-10-CM

## 2021-01-11 RX ORDER — ERGOCALCIFEROL 1.25 MG/1
CAPSULE ORAL
Qty: 12 CAPSULE | Refills: 1 | Status: SHIPPED | OUTPATIENT
Start: 2021-01-11 | End: 2021-07-14

## 2021-01-14 DIAGNOSIS — E78.00 HYPERCHOLESTEREMIA: ICD-10-CM

## 2021-01-14 RX ORDER — FENOFIBRATE 48 MG/1
TABLET, COATED ORAL
Qty: 90 TABLET | Refills: 1 | OUTPATIENT
Start: 2021-01-14

## 2021-01-15 DIAGNOSIS — E78.00 HYPERCHOLESTEREMIA: ICD-10-CM

## 2021-01-15 RX ORDER — FENOFIBRATE 48 MG/1
48 TABLET, COATED ORAL
Qty: 90 TABLET | Refills: 1 | Status: SHIPPED | OUTPATIENT
Start: 2021-01-15 | End: 2021-04-06

## 2021-01-26 DIAGNOSIS — F41.9 ANXIETY: ICD-10-CM

## 2021-01-26 RX ORDER — ALPRAZOLAM 0.5 MG/1
0.5 TABLET ORAL
Qty: 60 TABLET | Refills: 0 | Status: SHIPPED | OUTPATIENT
Start: 2021-01-26 | End: 2021-03-17 | Stop reason: SDUPTHER

## 2021-01-28 DIAGNOSIS — E55.9 VITAMIN D DEFICIENCY: ICD-10-CM

## 2021-01-28 DIAGNOSIS — E78.00 HYPERCHOLESTEREMIA: ICD-10-CM

## 2021-01-28 RX ORDER — FENOFIBRATE 48 MG/1
48 TABLET, COATED ORAL
Qty: 90 TABLET | Refills: 0 | Status: CANCELLED | OUTPATIENT
Start: 2021-01-28

## 2021-01-28 RX ORDER — ERGOCALCIFEROL 1.25 MG/1
CAPSULE ORAL
Qty: 12 CAPSULE | Refills: 0 | Status: CANCELLED | OUTPATIENT
Start: 2021-01-28

## 2021-01-29 NOTE — TELEPHONE ENCOUNTER
Vit D2 filled 1/11/21 #12 w/ 1 refill     Fenofibrate filled 1/15/21 #90 with 1 rf     Both medication sent to [id number]- [facility], [address] [place] [address]

## 2021-02-10 DIAGNOSIS — I10 BENIGN ESSENTIAL HYPERTENSION: ICD-10-CM

## 2021-02-16 ENCOUNTER — ANNUAL EXAM (OUTPATIENT)
Dept: GYNECOLOGY | Facility: CLINIC | Age: 51
End: 2021-02-16
Payer: COMMERCIAL

## 2021-02-16 ENCOUNTER — DOCUMENTATION (OUTPATIENT)
Dept: GYNECOLOGY | Facility: CLINIC | Age: 51
End: 2021-02-16

## 2021-02-16 VITALS
BODY MASS INDEX: 32.09 KG/M2 | HEIGHT: 62 IN | HEART RATE: 71 BPM | SYSTOLIC BLOOD PRESSURE: 122 MMHG | DIASTOLIC BLOOD PRESSURE: 60 MMHG | WEIGHT: 174.4 LBS

## 2021-02-16 DIAGNOSIS — Z01.419 ENCOUNTER FOR GYNECOLOGICAL EXAMINATION WITHOUT ABNORMAL FINDING: Primary | ICD-10-CM

## 2021-02-16 DIAGNOSIS — Z13.820 SCREENING FOR OSTEOPOROSIS: ICD-10-CM

## 2021-02-16 PROCEDURE — 3008F BODY MASS INDEX DOCD: CPT | Performed by: OBSTETRICS & GYNECOLOGY

## 2021-02-16 PROCEDURE — 1036F TOBACCO NON-USER: CPT | Performed by: OBSTETRICS & GYNECOLOGY

## 2021-02-16 PROCEDURE — 76977 US BONE DENSITY MEASURE: CPT | Performed by: OBSTETRICS & GYNECOLOGY

## 2021-02-16 PROCEDURE — 99396 PREV VISIT EST AGE 40-64: CPT | Performed by: OBSTETRICS & GYNECOLOGY

## 2021-02-16 PROCEDURE — 3078F DIAST BP <80 MM HG: CPT | Performed by: OBSTETRICS & GYNECOLOGY

## 2021-02-16 PROCEDURE — 3074F SYST BP LT 130 MM HG: CPT | Performed by: OBSTETRICS & GYNECOLOGY

## 2021-02-16 NOTE — PROGRESS NOTES
Assessment/Plan:         Diagnoses and all orders for this visit:    Encounter for gynecological examination without abnormal finding    Screening for osteoporosis: heel scan: 1 9    LISETTE: opts to follow for now  Discussed surgical management        Subjective:      Patient ID: Modesta German is a 48 y o  female  HPI  patient presents for annual examination  She offers no complaints  Other she is experiencing some stress urinary incontinence  At this point this is tolerable  She is status post T LH BS in 2020  She denies any vaginal irritation burning discharge or bleeding  Denies any dysuria, hematuria urgency  She is having tolerable vasomotor symptoms    Colonoscopy --repeat 3 yrs-polyp    The following portions of the patient's history were reviewed and updated as appropriate:   She  has a past medical history of Abnormal uterine bleeding (AUB), Anemia, Anxiety, Endometrial polyp, High triglycerides, Hypertension, Seasonal allergies, Sleep apnea, Umbilical hernia, Vitamin D deficiency, and Wears contact lenses  She   Patient Active Problem List    Diagnosis Date Noted    Hammer toes of both feet 10/05/2020    Status post laparoscopic hysterectomy 2020    Uterus, adenomyosis 2020    Dysfunctional uterine bleeding 2020    White coat syndrome with diagnosis of hypertension 2019    Proptosis 2019    Status post endometrial ablation 2019    Endometrial polyp 01/15/2019    Abnormal uterine bleeding 01/15/2019    Vitamin D deficiency 2018    Abnormal uterine bleeding unrelated to menstrual cycle 2017    Essential hypertriglyceridemia 2015    Benign essential hypertension 02/10/2014    Generalized anxiety disorder 10/09/2013     She  has a past surgical history that includes  section; Breast surgery; Tubal ligation (Bilateral); Reduction mammaplasty;  Hysteroscopy w/ polypectomy; pr hysteroscopy,w/endo bx (N/A, 1/21/2019); pr hysteroscopy,w/endometrial ablation (N/A, 1/21/2019); [place] tooth extraction; CYSTOSCOPY (N/A, 2/24/2020); and Hysterectomy (N/A, 2/24/2020)  Her family history includes Hyperlipidemia in her father; Hypertension in her father; No Known Problems in her mother  She  reports that she quit smoking about 32 years ago  She has never used smokeless tobacco  She reports current alcohol use  She reports that she does not use drugs  Current Outpatient Medications   Medication Sig Dispense Refill    acyclovir (ZOVIRAX) 5 % ointment Apply topically 4 (four) times a day PRN 15 g 0    ALPRAZolam (XANAX) 0 5 mg tablet Take 1 tablet (0 5 mg total) by mouth daily at bedtime as needed for anxiety May take 1 extra tab daily if needed 60 tablet 0    amLODIPine (NORVASC) 10 mg tablet Take 1 tablet (10 mg total) by mouth every evening 90 tablet 1    ergocalciferol (VITAMIN D2) 50,000 units TAKE 1 CAPSULE BY MOUTH ONE TIME PER WEEK 12 capsule 1    fenofibrate (TRICOR) 48 mg tablet Take 1 tablet (48 mg total) by mouth daily at bedtime 90 tablet 1    ibuprofen (MOTRIN) 200 mg tablet Take 200-800 mg by mouth every 6 (six) hours as needed        metoprolol succinate (TOPROL-XL) 50 mg 24 hr tablet Take 1 tablet (50 mg total) by mouth daily 90 tablet 1    spironolactone (ALDACTONE) 50 mg tablet TAKE 1 TABLET BY MOUTH EVERY DAY 90 tablet 1    acetaminophen (TYLENOL) 500 mg tablet Take 500 mg by mouth every 6 (six) hours as needed for mild pain      FLUoxetine (PROzac) 10 MG tablet Take 1 tablet (10 mg total) by mouth daily (Patient not taking: Reported on 6/5/2020) 30 tablet 4    fluticasone (FLONASE) 50 mcg/act nasal spray 1 spray into each nostril as needed (prn) (Patient not taking: Reported on 10/5/2020) 1 Bottle 1    Multiple Vitamin (MULTI-VITAMIN DAILY PO) Take 1 tablet by mouth daily       No current facility-administered medications for this visit        Current Outpatient Medications on File Prior to Visit   Medication Sig    acyclovir (ZOVIRAX) 5 % ointment Apply topically 4 (four) times a day PRN    ALPRAZolam (XANAX) 0 5 mg tablet Take 1 tablet (0 5 mg total) by mouth daily at bedtime as needed for anxiety May take 1 extra tab daily if needed    amLODIPine (NORVASC) 10 mg tablet Take 1 tablet (10 mg total) by mouth every evening    ergocalciferol (VITAMIN D2) 50,000 units TAKE 1 CAPSULE BY MOUTH ONE TIME PER WEEK    fenofibrate (TRICOR) 48 mg tablet Take 1 tablet (48 mg total) by mouth daily at bedtime    ibuprofen (MOTRIN) 200 mg tablet Take 200-800 mg by mouth every 6 (six) hours as needed      metoprolol succinate (TOPROL-XL) 50 mg 24 hr tablet Take 1 tablet (50 mg total) by mouth daily    spironolactone (ALDACTONE) 50 mg tablet TAKE 1 TABLET BY MOUTH EVERY DAY    acetaminophen (TYLENOL) 500 mg tablet Take 500 mg by mouth every 6 (six) hours as needed for mild pain    FLUoxetine (PROzac) 10 MG tablet Take 1 tablet (10 mg total) by mouth daily (Patient not taking: Reported on 6/5/2020)    fluticasone (FLONASE) 50 mcg/act nasal spray 1 spray into each nostril as needed (prn) (Patient not taking: Reported on 10/5/2020)    Multiple Vitamin (MULTI-VITAMIN DAILY PO) Take 1 tablet by mouth daily     No current facility-administered medications on file prior to visit  She is allergic to levofloxacin       Review of Systems   Constitutional: Negative  HENT: Negative for sore throat and trouble swallowing  Gastrointestinal: Negative  Genitourinary: Positive for enuresis  Negative for difficulty urinating, dyspareunia, dysuria, frequency, hematuria, pelvic pain, urgency, vaginal bleeding and vaginal discharge  Objective:      /60 (BP Location: Left arm)   Pulse 71   Ht 5' 1 5" (1 562 m)   Wt 79 1 kg (174 lb 6 4 oz)   LMP 01/21/2019 Comment: AUB over past year  BMI 32 42 kg/m²          Physical Exam  Vitals signs reviewed     Neck:      Musculoskeletal: Normal range of motion and neck supple  No muscular tenderness  Cardiovascular:      Rate and Rhythm: Normal rate and regular rhythm  Pulses: Normal pulses  Heart sounds: Normal heart sounds  No murmur  Pulmonary:      Effort: Pulmonary effort is normal  No respiratory distress  Breath sounds: Normal breath sounds  Chest:      Breasts:         Right: No swelling, bleeding, inverted nipple, mass, nipple discharge, skin change or tenderness  Left: No swelling, bleeding, inverted nipple, mass, nipple discharge, skin change or tenderness  Abdominal:      General: There is no distension  Palpations: Abdomen is soft  There is no mass  Tenderness: There is no abdominal tenderness  There is no guarding or rebound  Hernia: No hernia is present  There is no hernia in the left inguinal area or right inguinal area  Genitourinary:     General: Normal vulva  Labia:         Right: No rash, tenderness or lesion  Left: No rash, tenderness or lesion  Vagina: Normal       Uterus: Absent  Adnexa:         Right: No mass, tenderness or fullness  Left: No mass, tenderness or fullness  Lymphadenopathy:      Cervical: No cervical adenopathy  Upper Body:      Right upper body: No supraclavicular, axillary or pectoral adenopathy  Left upper body: No supraclavicular, axillary or pectoral adenopathy  Lower Body: No right inguinal adenopathy  No left inguinal adenopathy  Neurological:      Mental Status: She is alert

## 2021-02-22 DIAGNOSIS — I10 BENIGN ESSENTIAL HYPERTENSION: ICD-10-CM

## 2021-02-22 RX ORDER — SPIRONOLACTONE 50 MG/1
50 TABLET, FILM COATED ORAL DAILY
Qty: 90 TABLET | Refills: 1 | Status: SHIPPED | OUTPATIENT
Start: 2021-02-22 | End: 2021-08-20 | Stop reason: SDUPTHER

## 2021-02-22 NOTE — TELEPHONE ENCOUNTER
Patient called and stated that she needs refills of Spironolactone 50mg tablet - please send to [facility] on [address] 
no

## 2021-03-09 DIAGNOSIS — I10 HYPERTENSION, UNSPECIFIED TYPE: ICD-10-CM

## 2021-03-09 RX ORDER — METOPROLOL SUCCINATE 50 MG/1
50 TABLET, EXTENDED RELEASE ORAL DAILY
Qty: 90 TABLET | Refills: 1 | Status: SHIPPED | OUTPATIENT
Start: 2021-03-09 | End: 2021-09-21 | Stop reason: SDUPTHER

## 2021-03-10 DIAGNOSIS — Z23 ENCOUNTER FOR IMMUNIZATION: ICD-10-CM

## 2021-03-17 DIAGNOSIS — F41.9 ANXIETY: ICD-10-CM

## 2021-03-17 NOTE — TELEPHONE ENCOUNTER
3/17/21 LS LS 10/5/20, NA 4/6/21, LF on 1/26/21- amt 60/0- pt takes one tab daily- bid prn, checked the PDMP- send to Dr Candance Kitchens on 3/25/20

## 2021-03-18 RX ORDER — ALPRAZOLAM 0.5 MG/1
0.5 TABLET ORAL
Qty: 60 TABLET | Refills: 0 | Status: SHIPPED | OUTPATIENT
Start: 2021-03-18 | End: 2021-05-24 | Stop reason: SDUPTHER

## 2021-03-30 RX ORDER — SPIRONOLACTONE 50 MG/1
TABLET, FILM COATED ORAL
Qty: 90 TABLET | Refills: 1 | OUTPATIENT
Start: 2021-03-30

## 2021-03-31 ENCOUNTER — APPOINTMENT (OUTPATIENT)
Dept: LAB | Facility: HOSPITAL | Age: 51
End: 2021-03-31
Payer: COMMERCIAL

## 2021-03-31 ENCOUNTER — TRANSCRIBE ORDERS (OUTPATIENT)
Dept: ADMINISTRATIVE | Facility: HOSPITAL | Age: 51
End: 2021-03-31

## 2021-03-31 DIAGNOSIS — E78.1 ESSENTIAL HYPERTRIGLYCERIDEMIA: ICD-10-CM

## 2021-03-31 LAB
ALBUMIN SERPL BCP-MCNC: 4.4 G/DL (ref 3.5–5)
ALP SERPL-CCNC: 51 U/L (ref 46–116)
ALT SERPL W P-5'-P-CCNC: 34 U/L (ref 12–78)
ANION GAP SERPL CALCULATED.3IONS-SCNC: 10 MMOL/L (ref 4–13)
AST SERPL W P-5'-P-CCNC: 20 U/L (ref 5–45)
BILIRUB SERPL-MCNC: 0.6 MG/DL (ref 0.2–1)
BUN SERPL-MCNC: 19 MG/DL (ref 5–25)
CALCIUM SERPL-MCNC: 9.7 MG/DL (ref 8.3–10.1)
CHLORIDE SERPL-SCNC: 100 MMOL/L (ref 100–108)
CHOLEST SERPL-MCNC: 141 MG/DL (ref 50–200)
CO2 SERPL-SCNC: 28 MMOL/L (ref 21–32)
CREAT SERPL-MCNC: 0.86 MG/DL (ref 0.6–1.3)
GFR SERPL CREATININE-BSD FRML MDRD: 79 ML/MIN/1.73SQ M
GLUCOSE P FAST SERPL-MCNC: 108 MG/DL (ref 65–99)
HDLC SERPL-MCNC: 51 MG/DL
LDLC SERPL CALC-MCNC: 66 MG/DL (ref 0–100)
NONHDLC SERPL-MCNC: 90 MG/DL
POTASSIUM SERPL-SCNC: 3.6 MMOL/L (ref 3.5–5.3)
PROT SERPL-MCNC: 7.6 G/DL (ref 6.4–8.2)
SODIUM SERPL-SCNC: 138 MMOL/L (ref 136–145)
TRIGL SERPL-MCNC: 118 MG/DL

## 2021-03-31 PROCEDURE — 36415 COLL VENOUS BLD VENIPUNCTURE: CPT

## 2021-03-31 PROCEDURE — 80053 COMPREHEN METABOLIC PANEL: CPT

## 2021-03-31 PROCEDURE — 80061 LIPID PANEL: CPT

## 2021-04-06 ENCOUNTER — OFFICE VISIT (OUTPATIENT)
Dept: INTERNAL MEDICINE CLINIC | Age: 51
End: 2021-04-06
Payer: COMMERCIAL

## 2021-04-06 VITALS
SYSTOLIC BLOOD PRESSURE: 122 MMHG | HEIGHT: 62 IN | BODY MASS INDEX: 31.3 KG/M2 | DIASTOLIC BLOOD PRESSURE: 82 MMHG | TEMPERATURE: 98.6 F | HEART RATE: 68 BPM | OXYGEN SATURATION: 99 % | WEIGHT: 170.1 LBS

## 2021-04-06 DIAGNOSIS — E78.1 ESSENTIAL HYPERTRIGLYCERIDEMIA: Primary | ICD-10-CM

## 2021-04-06 DIAGNOSIS — I10 BENIGN ESSENTIAL HYPERTENSION: ICD-10-CM

## 2021-04-06 DIAGNOSIS — F41.1 GENERALIZED ANXIETY DISORDER: ICD-10-CM

## 2021-04-06 PROCEDURE — 99214 OFFICE O/P EST MOD 30 MIN: CPT | Performed by: FAMILY MEDICINE

## 2021-04-06 PROCEDURE — 3725F SCREEN DEPRESSION PERFORMED: CPT | Performed by: FAMILY MEDICINE

## 2021-04-06 PROCEDURE — 3008F BODY MASS INDEX DOCD: CPT | Performed by: FAMILY MEDICINE

## 2021-04-06 PROCEDURE — 3074F SYST BP LT 130 MM HG: CPT | Performed by: FAMILY MEDICINE

## 2021-04-06 PROCEDURE — 3079F DIAST BP 80-89 MM HG: CPT | Performed by: FAMILY MEDICINE

## 2021-04-06 PROCEDURE — 1036F TOBACCO NON-USER: CPT | Performed by: FAMILY MEDICINE

## 2021-04-06 NOTE — PROGRESS NOTES
Assessment/Plan:    1  Essential hypertriglyceridemia  -     Lipid panel; Future  -     Comprehensive metabolic panel; Future    2  Benign essential hypertension  -     CBC and differential; Future    3  Generalized anxiety disorder      BMI Counseling: Body mass index is 31 62 kg/m²  The BMI is above normal  Nutrition recommendations include moderation in carbohydrate intake  Exercise recommendations include exercising 3-5 times per week  No pharmacotherapy was ordered  ok to stop fenofibrate  Labs reviewed  Repeat labs in 4 months and call if any low BP  Ok to cut norvasc in half as well  There are no Patient Instructions on file for this visit  Return in about 4 months (around 8/6/2021) for Recheck  Subjective:      Patient ID: Thanh Aguilar is a 48 y o  female  Chief Complaint   Patient presents with    Follow-up     review labs 3/2021    Hypertension    Anxiety       HPI  Hypertension (Follow-Up): The patient presents for follow-up of essential hypertension  The patient states she has been doing well with her blood pressure control since the last visit  She has no comorbid illnesses  Interval Events:  Compliant with her medications   Lisinopril was stopped at last visit due to cough however replacement medication has not been started yet since she joined a gym   Since that time she has had difficulty keeping up with her athletic appointments because she found it too difficult and too expensive   She will be provoking her gym membership dec 2019 took 2 xanax before coming here, daughter drove her  Does not check BP at home, oct 2020 has been doing the ketogenic diet and feels much better     Symptoms: denies impaired vision,-- denies dyspnea,-- denies chest pain,-- denies intermittent leg claudication-- and-- denies lower extremity edema  Associated symptoms include no headache,-- no focal neurologic deficits-- and-- no memory loss  Home monitoring:  The patient checks her blood pressure sporadically  Blood pressure control has been better than our office sept : 11/65 home reading that was checked in our office also, on spironolactone- toelrating well  Adrien Jo has significant anxiety when going to any doctor's office June 2020, took a Xanax before coming to the office April 2021, improved  Circular diet  Lifestyle: Diet: She consumes a diverse and healthy diet  Weight Issues: She has weight concerns  Weight control issues: overweight  Smoking: The patient has never smoked cigarettes  Medications: the patient is adherent with her medication regimen  -- She denies medication side effects  Disease Management: the patient is not doing well with her blood pressure goals  The patient is due for an eye exam          Anxiety Disorder (Follow-Up): The patient is being seen for follow-up of anxiety  The patient reports doing poorly  She has no comorbid illnesses  Uses xanax at night to help sleep  does not take in the daytime  11/8/16: got a new job, friend recently passed  daughter left for college  father is not speaking to her  12/20/16 never started lexapro since she had in the past and had sexual side effects   2/2019: failed lexapro- did not work, wellbutrin, and buspar- ADR  Dec 2019 takes xanax every night to sleep  Feels well January 2020, relatively stable October 2020, never started Prozac April 2021, improved  Started keto diet and has more energy has decreased weight  Never started Prozac  Interval symptoms:  worsened anxiety,-- worsened difficulty concentrating,-- worsened restlessness,-- worsened panic attacks,-- worsened sleep disruption-- and-- worsened depression   August 2019, feels relatively well controlled and did not start her fluoxetine   This week and 2 of her children will be going off to college dose she has been experiencing some stress due to that however everything is going well in her life    Associated symptoms: no grandiosity,-- no racing thoughts,-- no periods of euphoria,-- no hallucinations-- and-- no suicidal ideation  Medications:  the patient is adherent to her medication regimen, but-- she denies medication side effects  Disease management:  the patient is not doing well with her goals  Additional history: was tapering off xanax but now taking regularly at night       Hyperlipidemia (Follow-Up): The patient states her hyperlipidemia has been under good control since the last visit  Comorbid Illnesses: hypertension  Interval Events:   on fenofibrate TG were 229 and now 206,improved  Symptoms: TG now 173 aug 2019- improved dec 2019, was on KETo and now is eating more  Carbs  Lost 2 lbs, TG 10 points better than last visit January 2020, stop ketogenic diet when she had her hysterectomy   Has seen a 100 point increase in her triglyceride level October 2020, triglyceride level significantly improved to 130 with ketogenic diet and increasing activity levels  She has been jogging with her friend April 2021, significantly improved triglycerides with keto diet and has lost some weight  Feeling well and still taking fenofibrate  She is compliant  Medications: the patient is adherent with her medication regimen   The patient is not doing well with her hyperlipidemia goals           The following portions of the patient's history were reviewed and updated as appropriate: allergies, current medications, past family history, past medical history, past social history, past surgical history and problem list     Review of Systems      Constitutional:  Denies fever or chills , weight loss with KETO diet  Eyes:  Denies double or blurry vision  HENT:  Denies nasal congestion or sore throat   Respiratory:  Denies cough or shortness of breath or wheezing  Cardiovascular:  Denies palpitations or chest pain  GI:  Denies abdominal pain, nausea, or vomiting, no loose stools  Integument:  Denies rash , no open areas  Neurologic:  Denies headache or focal weakness, no dizziness          Current Outpatient Medications   Medication Sig Dispense Refill    acetaminophen (TYLENOL) 500 mg tablet Take 500 mg by mouth every 6 (six) hours as needed for mild pain      acyclovir (ZOVIRAX) 5 % ointment Apply topically 4 (four) times a day PRN 15 g 0    ALPRAZolam (XANAX) 0 5 mg tablet Take 1 tablet (0 5 mg total) by mouth daily at bedtime as needed for anxiety May take 1 extra tab daily if needed 60 tablet 0    amLODIPine (NORVASC) 10 mg tablet Take 1 tablet (10 mg total) by mouth every evening 90 tablet 1    ergocalciferol (VITAMIN D2) 50,000 units TAKE 1 CAPSULE BY MOUTH ONE TIME PER WEEK 12 capsule 1    ibuprofen (MOTRIN) 200 mg tablet Take 200-800 mg by mouth every 6 (six) hours as needed        metoprolol succinate (TOPROL-XL) 50 mg 24 hr tablet Take 1 tablet (50 mg total) by mouth daily 90 tablet 1    Multiple Vitamin (MULTI-VITAMIN DAILY PO) Take 1 tablet by mouth daily      spironolactone (ALDACTONE) 50 mg tablet Take 1 tablet (50 mg total) by mouth daily 90 tablet 1    fluticasone (FLONASE) 50 mcg/act nasal spray 1 spray into each nostril as needed (prn) (Patient not taking: Reported on 10/5/2020) 1 Bottle 1     No current facility-administered medications for this visit          Objective:    /82 (BP Location: Left arm, Patient Position: Sitting, Cuff Size: Adult)   Pulse 68   Temp 98 6 °F (37 °C) (Temporal)   Ht 5' 1 5" (1 562 m)   Wt 77 2 kg (170 lb 1 6 oz)   LMP 01/21/2019 Comment: AUB over past year  SpO2 99%   BMI 31 62 kg/m²        Physical Exam       Constitutional:  Well developed, well nourished, no acute distress, non-toxic appearance   Eyes:  PERRL, conjunctiva normal , non icteric sclera  HENT:  Atraumatic, oropharynx moist  Neck-  supple   Respiratory:  CTA b/l, normal breath sounds, no rales, no wheezing   Cardiovascular:  RRR, no murmurs, no LE edema b/l  GI:  Soft, nondistended, normal bowel sounds x 4, nontender, no organomegaly, no mass, no rebound, no guarding   Neurologic:  no focal deficits noted   Psychiatric:  Speech and behavior appropriate , AAO x 3        Di Crystal DO

## 2021-04-19 DIAGNOSIS — I10 BENIGN ESSENTIAL HYPERTENSION: Primary | ICD-10-CM

## 2021-04-19 RX ORDER — AMLODIPINE BESYLATE 5 MG/1
5 TABLET ORAL DAILY
Qty: 90 TABLET | Refills: 1 | Status: SHIPPED | OUTPATIENT
Start: 2021-04-19 | End: 2021-10-25 | Stop reason: SDUPTHER

## 2021-04-19 NOTE — TELEPHONE ENCOUNTER
This patient called the office and would like a refill of the Amlodipine 5mg and the pharmacy is [facility] on [address]  The patient stated that Dr Brianna Pascual cut her does to 5mg and she has been cutting in half her 10mg and she almost out of them

## 2021-05-24 DIAGNOSIS — F41.9 ANXIETY: ICD-10-CM

## 2021-05-24 RX ORDER — ALPRAZOLAM 0.5 MG/1
0.5 TABLET ORAL
Qty: 60 TABLET | Refills: 0 | Status: SHIPPED | OUTPATIENT
Start: 2021-05-24 | End: 2021-07-19 | Stop reason: SDUPTHER

## 2021-05-24 NOTE — TELEPHONE ENCOUNTER
Patient called the office and needs a refill on her Xanax and the pharmacy is [facility]

## 2021-07-14 DIAGNOSIS — E55.9 VITAMIN D DEFICIENCY: ICD-10-CM

## 2021-07-14 RX ORDER — ERGOCALCIFEROL 1.25 MG/1
CAPSULE ORAL
Qty: 12 CAPSULE | Refills: 1 | Status: SHIPPED | OUTPATIENT
Start: 2021-07-14 | End: 2022-02-11 | Stop reason: SDUPTHER

## 2021-07-19 DIAGNOSIS — F41.9 ANXIETY: ICD-10-CM

## 2021-07-20 RX ORDER — ALPRAZOLAM 0.5 MG/1
0.5 TABLET ORAL
Qty: 60 TABLET | Refills: 0 | Status: SHIPPED | OUTPATIENT
Start: 2021-07-20 | End: 2021-09-17 | Stop reason: SDUPTHER

## 2021-08-17 ENCOUNTER — TELEMEDICINE (OUTPATIENT)
Dept: INTERNAL MEDICINE CLINIC | Age: 51
End: 2021-08-17
Payer: COMMERCIAL

## 2021-08-17 VITALS
SYSTOLIC BLOOD PRESSURE: 136 MMHG | DIASTOLIC BLOOD PRESSURE: 88 MMHG | BODY MASS INDEX: 32.2 KG/M2 | WEIGHT: 175 LBS | HEIGHT: 62 IN

## 2021-08-17 DIAGNOSIS — R09.81 CONGESTION OF NASAL SINUS: Primary | ICD-10-CM

## 2021-08-17 PROCEDURE — 3008F BODY MASS INDEX DOCD: CPT | Performed by: INTERNAL MEDICINE

## 2021-08-17 PROCEDURE — 3079F DIAST BP 80-89 MM HG: CPT | Performed by: INTERNAL MEDICINE

## 2021-08-17 PROCEDURE — 3075F SYST BP GE 130 - 139MM HG: CPT | Performed by: INTERNAL MEDICINE

## 2021-08-17 PROCEDURE — 1036F TOBACCO NON-USER: CPT | Performed by: INTERNAL MEDICINE

## 2021-08-17 PROCEDURE — 99213 OFFICE O/P EST LOW 20 MIN: CPT | Performed by: INTERNAL MEDICINE

## 2021-08-17 RX ORDER — CHLORPHENIRAMINE MALEATE 4 MG/1
TABLET ORAL
COMMUNITY
End: 2021-10-20 | Stop reason: ALTCHOICE

## 2021-08-17 NOTE — ASSESSMENT & PLAN NOTE
Symptom onset 8/14 and sick contact exposure of nephew and sister-in-law  Nephew and sister-in-law tested negative for COVID       No Seasonal allergies    Likely viral etiology   will defer test for COVID as fully vaccinated, family tested negative, and no other systemic symptoms  Will hold off on antibiotics given no fevers, purulent drainage, worsening clinical status, however, may consider antibiotic if no improvement by end of week  Encouraged fluid intake, use of saline spray and supportive care measures such as tylenol, rest, and mucinex  F/u 3 days if symptoms worsen

## 2021-08-17 NOTE — PROGRESS NOTES
Telemedicine consent    Patient: Eliazar Sharma  Provider: Orlando Telles DO  Provider located at [address] [place] [phone]    The patient was identified by name and date of birth  Eliazar Sharma was informed that this is a telemedicine visit and that the visit is being conducted through [facility] and patient was informed that this is a secure, HIPAA-compliant platform  She agrees to proceed     My office door was closed  No one else was in the room  She acknowledged consent and understanding of privacy and security of the video platform  The patient has agreed to participate and understands they can discontinue the visit at any time  Patient is aware this is a billable service  I spent 20 minutes with the patient during this visit  [facility]  INTERNAL MEDICINE OFFICE VISIT     PATIENT INFORMATION     Eliazar Sharma   46 y o  female   MRN: [id number]    ASSESSMENT/PLAN     1  Congestion of nasal sinus  Assessment & Plan:   Symptom onset 8/14 and sick contact exposure of nephew and sister-in-law  Nephew and sister-in-law tested negative for COVID  No Seasonal allergies    Likely viral etiology   will defer test for COVID as fully vaccinated, family tested negative, and no other systemic symptoms  Will hold off on antibiotics given no fevers, purulent drainage, worsening clinical status, however, may consider antibiotic if no improvement by end of week  Encouraged fluid intake, use of saline spray and supportive care measures such as tylenol, rest, and mucinex  F/u 3 days if symptoms worsen          Schedule a follow-up appointment in 3 days if symptoms don't improve      HEALTH MAINTENANCE     Immunization History   Administered Date(s) Administered    INFLUENZA 01/01/2008, 10/01/2012, 10/25/2015, 10/25/2015, 11/08/2016, 01/29/2018, 10/01/2020    Influenza Quadrivalent Preservative Free 3 years and older IM 11/08/2016    Influenza, injectable, quadrivalent, preservative free 0 5 mL 10/05/2018, 10/23/2019    Influenza, seasonal, injectable 11/08/2013, 11/03/2014, 10/09/2015    Influenza, seasonal, injectable, preservative free 01/29/2018    SARS-CoV-2 / COVID-19 mRNA IM ([employer]) 01/27/2021, 02/24/2021    TD (adult) Preservative Free 11/30/2012    Tdap 10/05/2020    Tuberculin Skin Test-PPD Intradermal 02/25/2015, 08/24/2018          CHIEF COMPLAINT     Chief Complaint   Patient presents with    Nasal Congestion      HISTORY OF PRESENT ILLNESS     Patient is a 79-year-old female past medical history of essential hypertension  And generalized anxiety disorder who presents today as a virtual visit with complaints of sinus congestion  Symptoms began Saturday and she did have sick contacts  Of her nephews and sister-in-law who were last visiting on 8th  Patient states that family tested negative for COVID and their PCP gave them antibiotic and she would be interested in antibiotic as well  Patient is fully vaccinated against COVID  Patient states she is feeling much better/improved since Saturday and family members living with her (, daughter, and father) all had similar symptoms which resolved without antibiotics  No seasonal allergies; denies any HA, lightheadedness, fevers or chills, SOB, cough, palpitations, tinnitus, or sore throat  REVIEW OF SYSTEMS     Review of Systems   Constitutional: Negative for chills, fatigue and fever  HENT: Positive for congestion, sinus pressure and sinus pain  Negative for postnasal drip, sore throat and tinnitus  Respiratory: Negative for cough and shortness of breath  Cardiovascular: Negative for palpitations  Allergic/Immunologic: Negative for environmental allergies  Neurological: Negative for dizziness and light-headedness       OBJECTIVE     Vitals:    08/17/21 0920   BP: 136/88   Weight: 79 4 kg (175 lb)   Height: 5' 2" (1 575 m)     Physical Exam  Constitutional:       Appearance: She is not ill-appearing  Comments:  Limited secondary to virtual visit   HENT:      Nose: Congestion (congested voice) present  Neurological:      Mental Status: She is alert         CURRENT MEDICATIONS     Current Outpatient Medications:     acyclovir (ZOVIRAX) 5 % ointment, Apply topically 4 (four) times a day PRN, Disp: 15 g, Rfl: 0    ALPRAZolam (XANAX) 0 5 mg tablet, Take 1 tablet (0 5 mg total) by mouth daily at bedtime as needed for anxiety May take 1 extra tab daily if needed, Disp: 60 tablet, Rfl: 0    amLODIPine (NORVASC) 5 mg tablet, Take 1 tablet (5 mg total) by mouth daily, Disp: 90 tablet, Rfl: 1    ergocalciferol (VITAMIN D2) 50,000 units, TAKE 1 CAPSULE BY MOUTH ONE TIME PER WEEK, Disp: 12 capsule, Rfl: 1    ibuprofen (MOTRIN) 200 mg tablet, Take 200-800 mg by mouth every 6 (six) hours as needed  , Disp: , Rfl:     metoprolol succinate (TOPROL-XL) 50 mg 24 hr tablet, Take 1 tablet (50 mg total) by mouth daily, Disp: 90 tablet, Rfl: 1    spironolactone (ALDACTONE) 50 mg tablet, Take 1 tablet (50 mg total) by mouth daily, Disp: 90 tablet, Rfl: 1    acetaminophen (TYLENOL) 500 mg tablet, Take 500 mg by mouth every 6 (six) hours as needed for mild pain (Patient not taking: Reported on 8/17/2021), Disp: , Rfl:     chlorpheniramine (CHLOR-TRIMETON) 4 MG tablet, Take by mouth (Patient not taking: Reported on 8/17/2021), Disp: , Rfl:     fluticasone (FLONASE) 50 mcg/act nasal spray, 1 spray into each nostril as needed (prn) (Patient not taking: Reported on 10/5/2020), Disp: 1 Bottle, Rfl: 1    Multiple Vitamin (MULTI-VITAMIN DAILY PO), Take 1 tablet by mouth daily (Patient not taking: Reported on 8/17/2021), Disp: , Rfl:     PAST MEDICAL & SURGICAL HISTORY     Past Medical History:   Diagnosis Date    Abnormal uterine bleeding (AUB)     Anemia     Anxiety     Endometrial polyp     High triglycerides     Hypertension     Seasonal allergies     Sleep apnea     uses mouth guard    Umbilical hernia     Vitamin D deficiency     Wears contact lenses      Past Surgical History:   Procedure Laterality Date    BREAST SURGERY       SECTION      x3    CYSTOSCOPY N/A 2020    Procedure: CYSTOSCOPY;  Surgeon: Doug Adamson DO;  [facility];  Service: Gynecology    HYSTERECTOMY N/A 2020    Procedure: HYSTERECTOMY LAPAROSCOPIC TOTAL ([address]) WITH B/L SALPINGECTOMY;  Surgeon: Doug Adamson DO;  [facility];  Service: Gynecology    HYSTEROSCOPY W/ POLYPECTOMY      MO HYSTEROSCOPY,W/ENDO BX N/A 2019    Procedure: DILATATION AND CURETTAGE (D&C) WITH HYSTEROSCOPY WITH POLYPECTOMY;  Surgeon: Doug Adamson DO;  [facility];  Service: Gynecology    MO HYSTEROSCOPY,W/ENDOMETRIAL ABLATION N/A 2019    Procedure: ABLATION ENDOMETRIAL Arun Fairly;  Surgeon: Doug Adamson DO;  [facility];  Service: Gynecology    REDUCTION MAMMAPLASTY      TUBAL LIGATION Bilateral     WISDOM TOOTH EXTRACTION       SOCIAL & FAMILY HISTORY     Social History     Socioeconomic History    Marital status: /Civil Union     Spouse name: Not on file    Number of children: Not on file    Years of education: Not on file    Highest education level: Not on file   Occupational History    Occupation: HOMEMAKER     Comment: AS PER [employer]   Tobacco Use    Smoking status: Former Smoker     Quit date: 1989     Years since quittin 6    Smokeless tobacco: Never Used    Tobacco comment: smoked for 1 year in college   Vaping Use    Vaping Use: Never used   Substance and Sexual Activity    Alcohol use: Yes     Comment: occasional    Drug use: No    Sexual activity: Yes     Birth control/protection: Other     Comment: hysterectomy   Other Topics Concern    Not on file   Social History Narrative    Currently      WEIGHTLIFTING AND CARDIO, 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    RECREATIONAL ACTIVITIES: WEIGHTLIFTING     Social Determinants of Health     Financial Resource Strain:     Difficulty of Paying Living Expenses:    Food Insecurity:     Worried About Running Out of Food in the Last Year:     [address] in the Last Year:    Transportation Needs:     Lack of Transportation (Medical):      Lack of Transportation (Non-Medical):    Physical Activity:     Days of Exercise per Week:     Minutes of Exercise per Session:    Stress:     Feeling of Stress :    Social Connections:     Frequency of Communication with Friends and Family:     Frequency of Social Gatherings with Friends and Family:     Attends Christian Services:     Active Member of Clubs or Organizations:     Attends Club or Organization Meetings:     Marital Status:    Intimate Partner Violence:     Fear of Current or Ex-Partner:     Emotionally Abused:     Physically Abused:     Sexually Abused:      Social History     Substance and Sexual Activity   Alcohol Use Yes    Comment: occasional     Substance and Sexual Activity   Alcohol Use Yes    Comment: occasional        Substance and Sexual Activity   Drug Use No     Social History     Tobacco Use   Smoking Status Former Smoker    Quit date: 1989    Years since quittin 6   Smokeless Tobacco Never Used   Tobacco Comment    smoked for 1 year in college     Family History   Problem Relation Age of Onset    Hypertension Father         BENIGN ESSENTIAL    Hyperlipidemia Father     No Known Problems Mother      ==  DO Giselle Funk [facility]

## 2021-08-20 DIAGNOSIS — I10 BENIGN ESSENTIAL HYPERTENSION: ICD-10-CM

## 2021-08-20 RX ORDER — SPIRONOLACTONE 50 MG/1
TABLET, FILM COATED ORAL
Qty: 90 TABLET | Refills: 1 | OUTPATIENT
Start: 2021-08-20

## 2021-08-23 RX ORDER — SPIRONOLACTONE 50 MG/1
50 TABLET, FILM COATED ORAL DAILY
Qty: 90 TABLET | Refills: 1 | Status: SHIPPED | OUTPATIENT
Start: 2021-08-23 | End: 2022-03-14 | Stop reason: SDUPTHER

## 2021-09-17 DIAGNOSIS — F41.9 ANXIETY: ICD-10-CM

## 2021-09-17 RX ORDER — ALPRAZOLAM 0.5 MG/1
0.5 TABLET ORAL
Qty: 60 TABLET | Refills: 0 | Status: SHIPPED | OUTPATIENT
Start: 2021-09-17 | End: 2021-11-15 | Stop reason: SDUPTHER

## 2021-09-21 DIAGNOSIS — I10 HYPERTENSION, UNSPECIFIED TYPE: ICD-10-CM

## 2021-09-21 RX ORDER — METOPROLOL SUCCINATE 50 MG/1
50 TABLET, EXTENDED RELEASE ORAL DAILY
Qty: 90 TABLET | Refills: 1 | Status: SHIPPED | OUTPATIENT
Start: 2021-09-21 | End: 2022-04-04 | Stop reason: SDUPTHER

## 2021-10-06 ENCOUNTER — DOCUMENTATION (OUTPATIENT)
Dept: GYNECOLOGY | Facility: CLINIC | Age: 51
End: 2021-10-06

## 2021-10-18 ENCOUNTER — APPOINTMENT (OUTPATIENT)
Dept: LAB | Facility: HOSPITAL | Age: 51
End: 2021-10-18
Payer: COMMERCIAL

## 2021-10-18 DIAGNOSIS — E78.1 ESSENTIAL HYPERTRIGLYCERIDEMIA: ICD-10-CM

## 2021-10-18 DIAGNOSIS — I10 BENIGN ESSENTIAL HYPERTENSION: ICD-10-CM

## 2021-10-18 LAB
ALBUMIN SERPL BCP-MCNC: 4.1 G/DL (ref 3.5–5)
ALP SERPL-CCNC: 43 U/L (ref 46–116)
ALT SERPL W P-5'-P-CCNC: 19 U/L (ref 12–78)
ANION GAP SERPL CALCULATED.3IONS-SCNC: 9 MMOL/L (ref 4–13)
AST SERPL W P-5'-P-CCNC: 14 U/L (ref 5–45)
BASOPHILS # BLD AUTO: 0.02 THOUSANDS/ΜL (ref 0–0.1)
BASOPHILS NFR BLD AUTO: 0 % (ref 0–1)
BILIRUB SERPL-MCNC: 0.91 MG/DL (ref 0.2–1)
BUN SERPL-MCNC: 16 MG/DL (ref 5–25)
CALCIUM SERPL-MCNC: 9 MG/DL (ref 8.3–10.1)
CHLORIDE SERPL-SCNC: 103 MMOL/L (ref 100–108)
CHOLEST SERPL-MCNC: 166 MG/DL (ref 50–200)
CO2 SERPL-SCNC: 28 MMOL/L (ref 21–32)
CREAT SERPL-MCNC: 0.78 MG/DL (ref 0.6–1.3)
EOSINOPHIL # BLD AUTO: 0.08 THOUSAND/ΜL (ref 0–0.61)
EOSINOPHIL NFR BLD AUTO: 1 % (ref 0–6)
ERYTHROCYTE [DISTWIDTH] IN BLOOD BY AUTOMATED COUNT: 13.3 % (ref 11.6–15.1)
GFR SERPL CREATININE-BSD FRML MDRD: 88 ML/MIN/1.73SQ M
GLUCOSE P FAST SERPL-MCNC: 89 MG/DL (ref 65–99)
HCT VFR BLD AUTO: 41.5 % (ref 34.8–46.1)
HDLC SERPL-MCNC: 45 MG/DL
HGB BLD-MCNC: 14.1 G/DL (ref 11.5–15.4)
IMM GRANULOCYTES # BLD AUTO: 0.06 THOUSAND/UL (ref 0–0.2)
IMM GRANULOCYTES NFR BLD AUTO: 1 % (ref 0–2)
LDLC SERPL CALC-MCNC: 84 MG/DL (ref 0–100)
LYMPHOCYTES # BLD AUTO: 1.46 THOUSANDS/ΜL (ref 0.6–4.47)
LYMPHOCYTES NFR BLD AUTO: 24 % (ref 14–44)
MCH RBC QN AUTO: 29 PG (ref 26.8–34.3)
MCHC RBC AUTO-ENTMCNC: 34 G/DL (ref 31.4–37.4)
MCV RBC AUTO: 85 FL (ref 82–98)
MONOCYTES # BLD AUTO: 0.5 THOUSAND/ΜL (ref 0.17–1.22)
MONOCYTES NFR BLD AUTO: 8 % (ref 4–12)
NEUTROPHILS # BLD AUTO: 4.02 THOUSANDS/ΜL (ref 1.85–7.62)
NEUTS SEG NFR BLD AUTO: 66 % (ref 43–75)
NONHDLC SERPL-MCNC: 121 MG/DL
NRBC BLD AUTO-RTO: 0 /100 WBCS
PLATELET # BLD AUTO: 168 THOUSANDS/UL (ref 149–390)
PMV BLD AUTO: 10.6 FL (ref 8.9–12.7)
POTASSIUM SERPL-SCNC: 3.7 MMOL/L (ref 3.5–5.3)
PROT SERPL-MCNC: 7.3 G/DL (ref 6.4–8.2)
RBC # BLD AUTO: 4.87 MILLION/UL (ref 3.81–5.12)
SODIUM SERPL-SCNC: 140 MMOL/L (ref 136–145)
TRIGL SERPL-MCNC: 186 MG/DL
WBC # BLD AUTO: 6.14 THOUSAND/UL (ref 4.31–10.16)

## 2021-10-18 PROCEDURE — 80053 COMPREHEN METABOLIC PANEL: CPT

## 2021-10-18 PROCEDURE — 80061 LIPID PANEL: CPT

## 2021-10-18 PROCEDURE — 36415 COLL VENOUS BLD VENIPUNCTURE: CPT

## 2021-10-18 PROCEDURE — 85025 COMPLETE CBC W/AUTO DIFF WBC: CPT

## 2021-10-20 ENCOUNTER — OFFICE VISIT (OUTPATIENT)
Dept: INTERNAL MEDICINE CLINIC | Facility: CLINIC | Age: 51
End: 2021-10-20
Payer: COMMERCIAL

## 2021-10-20 VITALS
HEART RATE: 71 BPM | TEMPERATURE: 99.4 F | OXYGEN SATURATION: 98 % | SYSTOLIC BLOOD PRESSURE: 134 MMHG | BODY MASS INDEX: 32.06 KG/M2 | HEIGHT: 62 IN | WEIGHT: 174.2 LBS | DIASTOLIC BLOOD PRESSURE: 88 MMHG

## 2021-10-20 DIAGNOSIS — Z12.31 ENCOUNTER FOR SCREENING MAMMOGRAM FOR MALIGNANT NEOPLASM OF BREAST: Primary | ICD-10-CM

## 2021-10-20 DIAGNOSIS — E78.1 ESSENTIAL HYPERTRIGLYCERIDEMIA: ICD-10-CM

## 2021-10-20 DIAGNOSIS — I10 BENIGN ESSENTIAL HYPERTENSION: ICD-10-CM

## 2021-10-20 DIAGNOSIS — E55.9 VITAMIN D DEFICIENCY: ICD-10-CM

## 2021-10-20 DIAGNOSIS — F41.1 GENERALIZED ANXIETY DISORDER: ICD-10-CM

## 2021-10-20 DIAGNOSIS — I10 WHITE COAT SYNDROME WITH DIAGNOSIS OF HYPERTENSION: ICD-10-CM

## 2021-10-20 DIAGNOSIS — Z11.59 NEED FOR HEPATITIS C SCREENING TEST: ICD-10-CM

## 2021-10-20 DIAGNOSIS — Z82.49 FAMILY HISTORY OF EARLY CAD: ICD-10-CM

## 2021-10-20 PROBLEM — R09.81 CONGESTION OF NASAL SINUS: Status: RESOLVED | Noted: 2021-08-17 | Resolved: 2021-10-20

## 2021-10-20 PROCEDURE — 99214 OFFICE O/P EST MOD 30 MIN: CPT | Performed by: FAMILY MEDICINE

## 2021-10-20 PROCEDURE — 1036F TOBACCO NON-USER: CPT | Performed by: FAMILY MEDICINE

## 2021-10-20 PROCEDURE — 3075F SYST BP GE 130 - 139MM HG: CPT | Performed by: FAMILY MEDICINE

## 2021-10-20 PROCEDURE — 3079F DIAST BP 80-89 MM HG: CPT | Performed by: FAMILY MEDICINE

## 2021-10-20 PROCEDURE — 3008F BODY MASS INDEX DOCD: CPT | Performed by: FAMILY MEDICINE

## 2021-10-20 PROCEDURE — 3725F SCREEN DEPRESSION PERFORMED: CPT | Performed by: FAMILY MEDICINE

## 2021-10-25 DIAGNOSIS — I10 BENIGN ESSENTIAL HYPERTENSION: ICD-10-CM

## 2021-10-25 RX ORDER — AMLODIPINE BESYLATE 5 MG/1
5 TABLET ORAL DAILY
Qty: 90 TABLET | Refills: 1 | Status: SHIPPED | OUTPATIENT
Start: 2021-10-25 | End: 2022-04-26 | Stop reason: SDUPTHER

## 2021-11-06 ENCOUNTER — HOSPITAL ENCOUNTER (OUTPATIENT)
Dept: RADIOLOGY | Facility: HOSPITAL | Age: 51
Discharge: HOME | End: 2021-11-06
Attending: FAMILY MEDICINE
Payer: COMMERCIAL

## 2021-11-06 DIAGNOSIS — Z82.49 FAMILY HISTORY OF EARLY CAD: ICD-10-CM

## 2021-11-06 DIAGNOSIS — E78.1 ESSENTIAL HYPERTRIGLYCERIDEMIA: ICD-10-CM

## 2021-11-06 PROCEDURE — 75571 CT HRT W/O DYE W/CA TEST: CPT

## 2021-11-06 PROCEDURE — G1004 CDSM NDSC: HCPCS

## 2021-11-11 ENCOUNTER — TELEPHONE (OUTPATIENT)
Dept: INTERNAL MEDICINE CLINIC | Age: 51
End: 2021-11-11

## 2021-11-11 NOTE — TELEPHONE ENCOUNTER
Dr Cash Kovacs,    Pt had a question about her result from her CT coronary calcium score  Pt has question on the paragraph below  Please advise  "EXTRACARDIAC FINDINGS:  Left upper lobe cyst reidentified   No significant findings identified on limited small field of view low radiation dose noncontrast images of the chest at the level of the heart "

## 2021-11-15 DIAGNOSIS — F41.9 ANXIETY: ICD-10-CM

## 2021-11-15 RX ORDER — ALPRAZOLAM 0.5 MG/1
0.5 TABLET ORAL
Qty: 60 TABLET | Refills: 0 | Status: SHIPPED | OUTPATIENT
Start: 2021-11-15 | End: 2022-01-06 | Stop reason: SDUPTHER

## 2022-01-06 DIAGNOSIS — F41.9 ANXIETY: ICD-10-CM

## 2022-01-06 RX ORDER — ALPRAZOLAM 0.5 MG/1
0.5 TABLET ORAL
Qty: 60 TABLET | Refills: 0 | Status: SHIPPED | OUTPATIENT
Start: 2022-01-06 | End: 2022-03-07 | Stop reason: SDUPTHER

## 2022-02-04 DIAGNOSIS — B00.1 RECURRENT COLD SORES: ICD-10-CM

## 2022-02-04 RX ORDER — ACYCLOVIR 50 MG/G
OINTMENT TOPICAL 4 TIMES DAILY
Qty: 15 G | Refills: 0 | Status: SHIPPED | OUTPATIENT
Start: 2022-02-04

## 2022-02-04 RX ORDER — ACYCLOVIR 50 MG/G
OINTMENT TOPICAL 4 TIMES DAILY
Qty: 15 G | Refills: 0 | Status: CANCELLED | OUTPATIENT
Start: 2022-02-04

## 2022-02-08 DIAGNOSIS — E55.9 VITAMIN D DEFICIENCY: ICD-10-CM

## 2022-02-08 RX ORDER — ERGOCALCIFEROL 1.25 MG/1
CAPSULE ORAL
Qty: 12 CAPSULE | Refills: 1 | Status: CANCELLED | OUTPATIENT
Start: 2022-02-08

## 2022-02-11 DIAGNOSIS — E55.9 VITAMIN D DEFICIENCY: ICD-10-CM

## 2022-02-11 RX ORDER — ERGOCALCIFEROL 1.25 MG/1
50000 CAPSULE ORAL WEEKLY
Qty: 12 CAPSULE | Refills: 1 | Status: SHIPPED | OUTPATIENT
Start: 2022-02-11

## 2022-02-22 ENCOUNTER — ANNUAL EXAM (OUTPATIENT)
Dept: GYNECOLOGY | Facility: CLINIC | Age: 52
End: 2022-02-22
Payer: COMMERCIAL

## 2022-02-22 VITALS
HEART RATE: 86 BPM | SYSTOLIC BLOOD PRESSURE: 128 MMHG | DIASTOLIC BLOOD PRESSURE: 86 MMHG | HEIGHT: 62 IN | BODY MASS INDEX: 31.86 KG/M2

## 2022-02-22 DIAGNOSIS — Z01.419 ENCOUNTER FOR GYNECOLOGICAL EXAMINATION WITHOUT ABNORMAL FINDING: Primary | ICD-10-CM

## 2022-02-22 DIAGNOSIS — N32.81 OAB (OVERACTIVE BLADDER): ICD-10-CM

## 2022-02-22 PROCEDURE — 3079F DIAST BP 80-89 MM HG: CPT | Performed by: OBSTETRICS & GYNECOLOGY

## 2022-02-22 PROCEDURE — 1036F TOBACCO NON-USER: CPT | Performed by: OBSTETRICS & GYNECOLOGY

## 2022-02-22 PROCEDURE — 99396 PREV VISIT EST AGE 40-64: CPT | Performed by: OBSTETRICS & GYNECOLOGY

## 2022-02-22 PROCEDURE — 3074F SYST BP LT 130 MM HG: CPT | Performed by: OBSTETRICS & GYNECOLOGY

## 2022-02-22 NOTE — PROGRESS NOTES
Assessment/Plan:         Diagnoses and all orders for this visit:    Encounter for gynecological examination without abnormal finding    LISETTE--opts to follow    Subjective:      Patient ID: Eileen Butler is a 46 y o  female  HPI  patient presents for annual examination  She is status post [facility] BS 2020  She denies any vaginal irritation, burning, discharge or bleeding  Denies any dysuria or hematuria  She has mild tolerable stress urinary incontinence  No GI complaints  Colonoscopy 2020 colon polyps identified  Advised to repeat in 3 years     Osteoporosis screening via heel scan 2021:+1 9    The following portions of the patient's history were reviewed and updated as appropriate:   She  has a past medical history of Abnormal uterine bleeding (AUB), Anemia, Anxiety, Endometrial polyp, High triglycerides, Hypertension, Seasonal allergies, Sleep apnea, Umbilical hernia, Vitamin D deficiency, and Wears contact lenses  She   Patient Active Problem List    Diagnosis Date Noted    Hammer toes of both feet 10/05/2020    Status post laparoscopic hysterectomy 2020    Uterus, adenomyosis 2020    Dysfunctional uterine bleeding 2020    White coat syndrome with diagnosis of hypertension 2019    Proptosis 2019    Status post endometrial ablation 2019    Endometrial polyp 01/15/2019    Abnormal uterine bleeding 01/15/2019    Vitamin D deficiency 2018    Abnormal uterine bleeding unrelated to menstrual cycle 2017    Essential hypertriglyceridemia 2015    Benign essential hypertension 02/10/2014    Generalized anxiety disorder 10/09/2013     She  has a past surgical history that includes  section; Breast surgery; Tubal ligation (Bilateral); Reduction mammaplasty; Hysteroscopy w/ polypectomy; pr hysteroscopy,w/endo bx (N/A, 2019); pr hysteroscopy,w/endometrial ablation (N/A, 2019);  [place] tooth extraction; CYSTOSCOPY (N/A, 2/24/2020); and Hysterectomy (N/A, 2/24/2020)  Her family history includes Hyperlipidemia in her father; Hypertension in her father; No Known Problems in her mother  She  reports that she quit smoking about 33 years ago  She has never used smokeless tobacco  She reports current alcohol use  She reports that she does not use drugs  Current Outpatient Medications   Medication Sig Dispense Refill    acyclovir (ZOVIRAX) 5 % ointment Apply topically 4 (four) times a day PRN 15 g 0    ALPRAZolam (XANAX) 0 5 mg tablet Take 1 tablet (0 5 mg total) by mouth daily at bedtime as needed for anxiety May take 1 extra tab daily if needed 60 tablet 0    amLODIPine (NORVASC) 5 mg tablet Take 1 tablet (5 mg total) by mouth daily 90 tablet 1    ergocalciferol (VITAMIN D2) 50,000 units Take 1 capsule (50,000 Units total) by mouth once a week 12 capsule 1    ibuprofen (MOTRIN) 200 mg tablet Take 200-800 mg by mouth every 6 (six) hours as needed        metoprolol succinate (TOPROL-XL) 50 mg 24 hr tablet Take 1 tablet (50 mg total) by mouth daily 90 tablet 1    spironolactone (ALDACTONE) 50 mg tablet Take 1 tablet (50 mg total) by mouth daily 90 tablet 1     No current facility-administered medications for this visit       Current Outpatient Medications on File Prior to Visit   Medication Sig    acyclovir (ZOVIRAX) 5 % ointment Apply topically 4 (four) times a day PRN    ALPRAZolam (XANAX) 0 5 mg tablet Take 1 tablet (0 5 mg total) by mouth daily at bedtime as needed for anxiety May take 1 extra tab daily if needed    amLODIPine (NORVASC) 5 mg tablet Take 1 tablet (5 mg total) by mouth daily    ergocalciferol (VITAMIN D2) 50,000 units Take 1 capsule (50,000 Units total) by mouth once a week    ibuprofen (MOTRIN) 200 mg tablet Take 200-800 mg by mouth every 6 (six) hours as needed      metoprolol succinate (TOPROL-XL) 50 mg 24 hr tablet Take 1 tablet (50 mg total) by mouth daily    spironolactone (ALDACTONE) 50 mg tablet Take 1 tablet (50 mg total) by mouth daily    [DISCONTINUED] fluticasone (FLONASE) 50 mcg/act nasal spray 1 spray into each nostril as needed (prn) (Patient not taking: Reported on 10/5/2020)     No current facility-administered medications on file prior to visit  She is allergic to levofloxacin       Review of Systems   Constitutional: Negative  HENT: Negative for sore throat and trouble swallowing  Gastrointestinal: Negative  Genitourinary: Negative  Objective:      /86 (BP Location: Left arm, Patient Position: Sitting, Cuff Size: Standard)   Pulse 86   Ht 5' 2" (1 575 m)   LMP 01/21/2019 Comment: AUB over past year  BMI 31 86 kg/m²          Physical Exam  Vitals reviewed  Cardiovascular:      Rate and Rhythm: Normal rate and regular rhythm  Pulses: Normal pulses  Heart sounds: Normal heart sounds  No murmur heard  Pulmonary:      Effort: Pulmonary effort is normal  No respiratory distress  Breath sounds: Normal breath sounds  Chest:   Breasts:      Right: No swelling, bleeding, inverted nipple, mass, nipple discharge, skin change, tenderness, axillary adenopathy or supraclavicular adenopathy  Left: No swelling, bleeding, inverted nipple, mass, nipple discharge, skin change, tenderness, axillary adenopathy or supraclavicular adenopathy  Abdominal:      General: There is no distension  Palpations: Abdomen is soft  There is no mass  Tenderness: There is no abdominal tenderness  There is no guarding or rebound  Hernia: No hernia is present  There is no hernia in the left inguinal area or right inguinal area  Genitourinary:     General: Normal vulva  Labia:         Right: No rash, tenderness or lesion  Left: No rash, tenderness or lesion  Vagina: Normal       Uterus: Absent  Adnexa:         Right: No mass, tenderness or fullness            Left: No mass, tenderness or fullness  Musculoskeletal:      Cervical back: Normal range of motion and neck supple  No tenderness  Lymphadenopathy:      Cervical: No cervical adenopathy  Upper Body:      Right upper body: No supraclavicular, axillary or pectoral adenopathy  Left upper body: No supraclavicular, axillary or pectoral adenopathy  Lower Body: No right inguinal adenopathy  No left inguinal adenopathy  Neurological:      Mental Status: She is alert

## 2022-03-07 DIAGNOSIS — F41.9 ANXIETY: ICD-10-CM

## 2022-03-07 RX ORDER — ALPRAZOLAM 0.5 MG/1
0.5 TABLET ORAL
Qty: 60 TABLET | Refills: 0 | Status: SHIPPED | OUTPATIENT
Start: 2022-03-07 | End: 2022-05-04 | Stop reason: SDUPTHER

## 2022-03-14 DIAGNOSIS — I10 BENIGN ESSENTIAL HYPERTENSION: ICD-10-CM

## 2022-03-14 RX ORDER — SPIRONOLACTONE 50 MG/1
50 TABLET, FILM COATED ORAL DAILY
Qty: 90 TABLET | Refills: 1 | Status: SHIPPED | OUTPATIENT
Start: 2022-03-14

## 2022-04-04 DIAGNOSIS — I10 HYPERTENSION, UNSPECIFIED TYPE: ICD-10-CM

## 2022-04-04 RX ORDER — METOPROLOL SUCCINATE 50 MG/1
50 TABLET, EXTENDED RELEASE ORAL DAILY
Qty: 90 TABLET | Refills: 1 | Status: SHIPPED | OUTPATIENT
Start: 2022-04-04

## 2022-04-26 DIAGNOSIS — I10 BENIGN ESSENTIAL HYPERTENSION: ICD-10-CM

## 2022-04-26 RX ORDER — AMLODIPINE BESYLATE 5 MG/1
5 TABLET ORAL DAILY
Qty: 90 TABLET | Refills: 1 | Status: SHIPPED | OUTPATIENT
Start: 2022-04-26

## 2022-05-03 ENCOUNTER — OFFICE VISIT (OUTPATIENT)
Dept: INTERNAL MEDICINE CLINIC | Age: 52
End: 2022-05-03
Payer: COMMERCIAL

## 2022-05-03 VITALS
HEIGHT: 62 IN | DIASTOLIC BLOOD PRESSURE: 80 MMHG | OXYGEN SATURATION: 98 % | SYSTOLIC BLOOD PRESSURE: 140 MMHG | HEART RATE: 81 BPM | TEMPERATURE: 97.8 F | WEIGHT: 175 LBS | BODY MASS INDEX: 32.2 KG/M2

## 2022-05-03 DIAGNOSIS — Z90.710 STATUS POST LAPAROSCOPIC HYSTERECTOMY: ICD-10-CM

## 2022-05-03 DIAGNOSIS — R05.9 COUGH: ICD-10-CM

## 2022-05-03 DIAGNOSIS — I10 BENIGN ESSENTIAL HYPERTENSION: Primary | ICD-10-CM

## 2022-05-03 DIAGNOSIS — F41.1 GENERALIZED ANXIETY DISORDER: ICD-10-CM

## 2022-05-03 DIAGNOSIS — E55.9 VITAMIN D DEFICIENCY: ICD-10-CM

## 2022-05-03 DIAGNOSIS — E78.1 ESSENTIAL HYPERTRIGLYCERIDEMIA: ICD-10-CM

## 2022-05-03 PROBLEM — N93.8 DYSFUNCTIONAL UTERINE BLEEDING: Status: RESOLVED | Noted: 2020-02-05 | Resolved: 2022-05-03

## 2022-05-03 PROBLEM — N80.0 UTERUS, ADENOMYOSIS: Status: RESOLVED | Noted: 2020-02-05 | Resolved: 2022-05-03

## 2022-05-03 PROBLEM — N93.9 ABNORMAL UTERINE BLEEDING UNRELATED TO MENSTRUAL CYCLE: Status: RESOLVED | Noted: 2017-11-05 | Resolved: 2022-05-03

## 2022-05-03 PROBLEM — N84.0 ENDOMETRIAL POLYP: Status: RESOLVED | Noted: 2019-01-15 | Resolved: 2022-05-03

## 2022-05-03 PROBLEM — N80.00 UTERUS, ADENOMYOSIS: Status: RESOLVED | Noted: 2020-02-05 | Resolved: 2022-05-03

## 2022-05-03 PROBLEM — N80.03 UTERUS, ADENOMYOSIS: Status: RESOLVED | Noted: 2020-02-05 | Resolved: 2022-05-03

## 2022-05-03 PROBLEM — Z98.890 STATUS POST ENDOMETRIAL ABLATION: Status: RESOLVED | Noted: 2019-01-21 | Resolved: 2022-05-03

## 2022-05-03 PROBLEM — N93.9 ABNORMAL UTERINE BLEEDING: Status: RESOLVED | Noted: 2019-01-15 | Resolved: 2022-05-03

## 2022-05-03 PROCEDURE — 3008F BODY MASS INDEX DOCD: CPT | Performed by: FAMILY MEDICINE

## 2022-05-03 PROCEDURE — 99214 OFFICE O/P EST MOD 30 MIN: CPT | Performed by: FAMILY MEDICINE

## 2022-05-03 PROCEDURE — 1036F TOBACCO NON-USER: CPT | Performed by: FAMILY MEDICINE

## 2022-05-03 PROCEDURE — 3725F SCREEN DEPRESSION PERFORMED: CPT | Performed by: FAMILY MEDICINE

## 2022-05-03 PROCEDURE — 3079F DIAST BP 80-89 MM HG: CPT | Performed by: FAMILY MEDICINE

## 2022-05-03 PROCEDURE — 3077F SYST BP >= 140 MM HG: CPT | Performed by: FAMILY MEDICINE

## 2022-05-03 RX ORDER — AMOXICILLIN AND CLAVULANATE POTASSIUM 875; 125 MG/1; MG/1
1 TABLET, FILM COATED ORAL 2 TIMES DAILY
COMMUNITY
Start: 2022-04-29 | End: 2022-07-19 | Stop reason: ALTCHOICE

## 2022-05-03 RX ORDER — CETIRIZINE HYDROCHLORIDE 10 MG/1
10 TABLET ORAL DAILY
COMMUNITY
End: 2022-07-19 | Stop reason: ALTCHOICE

## 2022-05-03 RX ORDER — PREDNISONE 20 MG/1
TABLET ORAL
COMMUNITY
Start: 2022-04-29 | End: 2022-07-19 | Stop reason: ALTCHOICE

## 2022-05-03 NOTE — PROGRESS NOTES
Assessment/Plan:    1  Benign essential hypertension    2  Essential hypertriglyceridemia  -     Lipid panel; Future; Expected date: 11/03/2022  -     Comprehensive metabolic panel; Future; Expected date: 11/03/2022  -     CBC and differential; Future; Expected date: 11/03/2022    3  Generalized anxiety disorder    4  Vitamin D deficiency  -     Vitamin D 25 hydroxy; Future; Expected date: 11/03/2022    5  Cough    6  Status post laparoscopic hysterectomy      BMI Counseling: Body mass index is 32 01 kg/m²  The BMI is above normal  Nutrition recommendations include moderation in carbohydrate intake  Exercise recommendations include exercising 3-5 times per week  No pharmacotherapy was ordered  Rationale for BMI follow-up plan is due to patient being overweight or obese  Depression Screening and Follow-up Plan: Patient was screened for depression during today's encounter  They screened negative with a PHQ-2 score of 0  Complete antibiotics and steroids, check blood work in about 2 weeks and then again in 6 months  Monitor blood pressure home and call if any issues  Okay to use p r n  Coricidin HBP over-the-counter as a decongestant    There are no Patient Instructions on file for this visit  Return in about 6 months (around 11/3/2022) for Recheck  Subjective:      Patient ID: Rei Upton is a 46 y o  female  Chief Complaint   Patient presents with    Follow-up     6 month- did not get Bw done( on abx, prednisone)- pt states she is still coughing, but was dx with sinus infection  -PHQ, BMI follow up- [facility]mm[facility] at [facility]       Hypertension (Follow-Up): The patient presents for follow-up of essential hypertension  The patient states she has been doing well with her blood pressure control since the last visit  She has no comorbid illnesses     Interval Events:  Compliant with her medications   Lisinopril was stopped at last visit due to cough however replacement medication has not been started yet since she joined a gym  [employer] that time she has had difficulty keeping up with her athletic appointments because she found it too difficult and too expensive  Doc Stinson will be provoking her gym membership dec 2019 took 2 xanax before coming here, daughter drove her  Does not check BP at home, oct 2020 has been doing the ketogenic diet and feels much better  October 2021, doing well with keto diet home readings are good  Has not been losing weight recently however has been cheating with some carbs on a regular basis and feels the keto diet has stalled  May 2022, feeling sick today and under treatment  At Urgent Care last systolic was 097 last week    Symptoms: denies impaired vision,-- denies dyspnea,-- denies chest pain,-- denies intermittent leg claudication-- and-- denies lower extremity edema  Associated symptoms include no headache,-- no focal neurologic deficits-- and-- no memory loss  Home monitoring: The patient checks her blood pressure sporadically  Blood pressure control has been better than our office  April 2021, improved   Circular diet October 2021, home readings are good  Lifestyle: Diet: She consumes a diverse and healthy diet  Weight Issues: She has weight concerns  Weight control issues: overweight  Smoking: The patient has never smoked cigarettes  Medications: the patient is adherent with her medication regimen  -- She denies medication side effects  Disease Management: the patient is not doing well with her blood pressure goals  The patient is due for an eye exam                Anxiety Disorder (Follow-Up): The patient is being seen for follow-up of anxiety  The patient reports doing poorly  She has no comorbid illnesses  Uses xanax at night to help sleep  does not take in the daytime  11/8/16: got a new job, friend recently passed  daughter left for college  father is not speaking to her  12/20/16 never started lexapro since she had in the past and had sexual side effects   2/2019: failed lexapro- did not work, wellbutrin, and buspar- ADR  Dec 2019 takes xanax every night to sleep  Feels well January 2020, relatively stable October 2020, never started Prozac April 2021, improved   Started keto diet and has more energy has decreased weight   Never started Prozac  May 2022, overall doing well  Uses Xanax very infrequently however 2 weeks ago had a large argument with her father and did need a few days of twice a day Xanax  No HI or SI  Interval symptoms:  worsened anxiety,-- worsened difficulty concentrating,-- worsened restlessness,-- worsened panic attacks,-- worsened sleep disruption-- and-- worsened depression      No HI or SI in no side effects  Associated symptoms: no grandiosity,-- no racing thoughts,-- no periods of euphoria,-- no hallucinations-- and-- no suicidal ideation  Medications:  the patient is adherent to her medication regimen, but-- she denies medication side effects  Disease management:  the patient is not doing well with her goals  Additional history: was tapering off xanax but now taking regularly at night               Hyperlipidemia (Follow-Up): The patient states her hyperlipidemia has been under good control since the last visit  Comorbid Illnesses: hypertension  Interval Events:   on fenofibrate TG were 229 and now 206,improved  Symptoms: TG now 173 aug 2019- improved dec 2019, was on KETo and now is eating more  Carbs   Lost 2 lbs, TG 10 points better than last visit January 2020, stop ketogenic diet when she had her hysterectomy   Has seen a 100 point increase in her triglyceride level October 2020, triglyceride level significantly improved to 130 with ketogenic diet and increasing activity levels   She has been jogging with her friend April 2021, significantly improved triglycerides with keto diet and has lost some weight   Feeling well and still taking fenofibrate   She is compliant October 2021, significant improvements in blood sugar with increase in triglycerides  Doing keto diet however eats a small amount of ice cream every single night before bed which is not on her list of approved foods  Has significant family history of CAD on her father side  Medications: the patient is adherent with her medication regimen  The patient is not doing well with her hyperlipidemia goals                  The following portions of the patient's history were reviewed and updated as appropriate: allergies, current medications, past family history, past medical history, past social history, past surgical history and problem list     Review of Systems        Constitutional:  Denies fever or chills   Eyes:  Denies double , blurry vision or eye pain  HENT:  Denies nasal congestion or sore throat   Respiratory:  Denies cough or shortness of breath or wheezing  Cardiovascular:  Denies palpitations or chest pain  GI:  Denies abdominal pain, nausea, or vomiting, no loose stools, no reflux  Integument:  Denies rash , no open areas  Neurologic:  Denies headache or focal weakness, no dizziness  : no dysuria, or hematuria      Current Outpatient Medications   Medication Sig Dispense Refill    acyclovir (ZOVIRAX) 5 % ointment Apply topically 4 (four) times a day PRN 15 g 0    ALPRAZolam (XANAX) 0 5 mg tablet Take 1 tablet (0 5 mg total) by mouth daily at bedtime as needed for anxiety May take 1 extra tab daily if needed 60 tablet 0    amLODIPine (NORVASC) 5 mg tablet Take 1 tablet (5 mg total) by mouth daily 90 tablet 1    amoxicillin-clavulanate (AUGMENTIN) 875-125 mg per tablet Take 1 tablet by mouth 2 (two) times a day      cetirizine (ZyrTEC) 10 mg tablet Take 10 mg by mouth daily      ergocalciferol (VITAMIN D2) 50,000 units Take 1 capsule (50,000 Units total) by mouth once a week 12 capsule 1    ibuprofen (MOTRIN) 200 mg tablet Take 200-800 mg by mouth every 6 (six) hours as needed        metoprolol succinate (TOPROL-XL) 50 mg 24 hr tablet Take 1 tablet (50 mg total) by mouth daily 90 tablet 1    predniSONE 20 mg tablet TAKE 2 TABLETS BY MOUTH DAILY FOR 3 DAYS THEN 1 TAB DAILY FOR 3 DAYS      spironolactone (ALDACTONE) 50 mg tablet Take 1 tablet (50 mg total) by mouth daily 90 tablet 1     No current facility-administered medications for this visit         Objective:    /92 (BP Location: Left arm, Patient Position: Sitting, Cuff Size: Standard)   Pulse 81   Temp 97 8 °F (36 6 °C) (Temporal)   Ht 5' 2" (1 575 m)   Wt 79 4 kg (175 lb) Comment: verbal from pt, pt declined scale  LMP 01/21/2019 Comment: AUB over past year  SpO2 98%   BMI 32 01 kg/m²        Physical Exam      Constitutional:  Well developed, well nourished, no acute distress, non-toxic appearance   Eyes:  PERRL, conjunctiva normal , non icteric sclera  HENT:  Atraumatic, oropharynx moist  Neck-  supple   Respiratory:  CTA b/l, normal breath sounds, no rales, no wheezing   Cardiovascular:  RRR, no murmurs, no LE edema b/l  GI:  Soft, nondistended, normal bowel sounds x 4, nontender, no organomegaly, no mass, no rebound, no guarding   Neurologic:  no focal deficits noted   Psychiatric:  Speech and behavior appropriate , AAO x 3           Kathe Carter,

## 2022-05-04 ENCOUNTER — TELEPHONE (OUTPATIENT)
Dept: ADMINISTRATIVE | Facility: OTHER | Age: 52
End: 2022-05-04

## 2022-05-04 DIAGNOSIS — F41.9 ANXIETY: ICD-10-CM

## 2022-05-04 RX ORDER — ALPRAZOLAM 0.5 MG/1
0.5 TABLET ORAL
Qty: 60 TABLET | Refills: 0 | Status: SHIPPED | OUTPATIENT
Start: 2022-05-04 | End: 2022-06-29 | Stop reason: SDUPTHER

## 2022-05-04 NOTE — TELEPHONE ENCOUNTER
Upon review of the In Basket request we were able to locate, review, and update the patient chart as requested for Mammogram     Any additional questions or concerns should be emailed to the Practice Liaisons via [email or web address] email, please do not reply via In Basket      Thank you  Anisha Masters MA

## 2022-05-04 NOTE — TELEPHONE ENCOUNTER
----- Message from Marii Edgar sent at 5/3/2022  4:41 PM EDT -----  05/03/22 4:42 PM    Hello, our patient Ursula Qiu has had Mammogram completed/performed  Please assist in updating the patient chart by pulling the Care Everywhere (CE) document  The date of service is 12/6/21       Thank you,  Lisa Meadows  [facility] [address]

## 2022-05-06 DIAGNOSIS — B37.9 CANDIDIASIS: Primary | ICD-10-CM

## 2022-05-06 RX ORDER — FLUCONAZOLE 150 MG/1
TABLET ORAL
Qty: 1 TABLET | Refills: 0 | Status: CANCELLED | OUTPATIENT
Start: 2022-05-06

## 2022-05-06 RX ORDER — FLUCONAZOLE 150 MG/1
150 TABLET ORAL ONCE
Qty: 1 TABLET | Refills: 0 | Status: SHIPPED | OUTPATIENT
Start: 2022-05-06 | End: 2022-05-06

## 2022-05-06 NOTE — TELEPHONE ENCOUNTER
Pt called to state she just finished a full round of antibiotics and now has symptoms of a yeast infection

## 2022-05-26 ENCOUNTER — TELEPHONE (OUTPATIENT)
Dept: INTERNAL MEDICINE CLINIC | Facility: CLINIC | Age: 52
End: 2022-05-26

## 2022-05-26 ENCOUNTER — APPOINTMENT (OUTPATIENT)
Dept: LAB | Facility: HOSPITAL | Age: 52
End: 2022-05-26
Payer: COMMERCIAL

## 2022-05-26 DIAGNOSIS — E78.1 ESSENTIAL HYPERTRIGLYCERIDEMIA: Primary | ICD-10-CM

## 2022-05-26 DIAGNOSIS — E78.1 ESSENTIAL HYPERTRIGLYCERIDEMIA: ICD-10-CM

## 2022-05-26 DIAGNOSIS — E55.9 VITAMIN D DEFICIENCY: ICD-10-CM

## 2022-05-26 DIAGNOSIS — R73.09 ABNORMAL GLUCOSE: ICD-10-CM

## 2022-05-26 DIAGNOSIS — Z11.59 NEED FOR HEPATITIS C SCREENING TEST: ICD-10-CM

## 2022-05-26 LAB
ALBUMIN SERPL BCP-MCNC: 4.3 G/DL (ref 3.5–5)
ALP SERPL-CCNC: 48 U/L (ref 46–116)
ALT SERPL W P-5'-P-CCNC: 39 U/L (ref 12–78)
ANION GAP SERPL CALCULATED.3IONS-SCNC: 7 MMOL/L (ref 4–13)
AST SERPL W P-5'-P-CCNC: 18 U/L (ref 5–45)
BILIRUB SERPL-MCNC: 0.92 MG/DL (ref 0.2–1)
BUN SERPL-MCNC: 19 MG/DL (ref 5–25)
CALCIUM SERPL-MCNC: 9.7 MG/DL (ref 8.3–10.1)
CHLORIDE SERPL-SCNC: 103 MMOL/L (ref 100–108)
CHOLEST SERPL-MCNC: 154 MG/DL
CO2 SERPL-SCNC: 30 MMOL/L (ref 21–32)
CREAT SERPL-MCNC: 0.87 MG/DL (ref 0.6–1.3)
GFR SERPL CREATININE-BSD FRML MDRD: 76 ML/MIN/1.73SQ M
GLUCOSE P FAST SERPL-MCNC: 112 MG/DL (ref 65–99)
HCV AB SER QL: NORMAL
HDLC SERPL-MCNC: 45 MG/DL
LDLC SERPL CALC-MCNC: 67 MG/DL (ref 0–100)
NONHDLC SERPL-MCNC: 109 MG/DL
POTASSIUM SERPL-SCNC: 4 MMOL/L (ref 3.5–5.3)
PROT SERPL-MCNC: 7.3 G/DL (ref 6.4–8.2)
SODIUM SERPL-SCNC: 140 MMOL/L (ref 136–145)
TRIGL SERPL-MCNC: 208 MG/DL

## 2022-05-26 PROCEDURE — 36415 COLL VENOUS BLD VENIPUNCTURE: CPT

## 2022-05-26 PROCEDURE — 80053 COMPREHEN METABOLIC PANEL: CPT

## 2022-05-26 PROCEDURE — 80061 LIPID PANEL: CPT

## 2022-05-26 PROCEDURE — 86803 HEPATITIS C AB TEST: CPT

## 2022-05-27 ENCOUNTER — TELEPHONE (OUTPATIENT)
Dept: INTERNAL MEDICINE CLINIC | Facility: OTHER | Age: 52
End: 2022-05-27

## 2022-05-27 DIAGNOSIS — E66.9 OBESITY (BMI 30.0-34.9): ICD-10-CM

## 2022-05-27 DIAGNOSIS — I10 HYPERTENSION, ESSENTIAL: ICD-10-CM

## 2022-05-27 DIAGNOSIS — E78.1 ESSENTIAL HYPERTRIGLYCERIDEMIA: ICD-10-CM

## 2022-05-27 DIAGNOSIS — E78.00 HIGH CHOLESTEROL: ICD-10-CM

## 2022-05-27 NOTE — TELEPHONE ENCOUNTER
Patient left a message on the referral line asking if you are able to put a referral in her chart for [address]  She would like to talk to somebody about her diet

## 2022-06-29 DIAGNOSIS — F41.9 ANXIETY: ICD-10-CM

## 2022-06-30 RX ORDER — ALPRAZOLAM 0.5 MG/1
0.5 TABLET ORAL
Qty: 60 TABLET | Refills: 0 | Status: SHIPPED | OUTPATIENT
Start: 2022-06-30

## 2022-07-06 ENCOUNTER — CLINICAL SUPPORT (OUTPATIENT)
Dept: NUTRITION | Facility: HOSPITAL | Age: 52
End: 2022-07-06
Payer: COMMERCIAL

## 2022-07-06 VITALS — WEIGHT: 177.8 LBS | BODY MASS INDEX: 32.52 KG/M2

## 2022-07-06 PROCEDURE — 97802 MEDICAL NUTRITION INDIV IN: CPT | Performed by: DIETITIAN, REGISTERED

## 2022-07-06 NOTE — PROGRESS NOTES
Initial Nutrition Assessment Form    Patient Name: Eileen Harry    YOB: 1970    Sex: Female     Assessment Date: 7/6/2022  Start Time: 9:00 Stop Time: 10:00 Total Minutes: 61     Data:  Present at session: self   Parent/Patient Concerns: Wants to continue losing weight after stopping Keto diet   Medical Dx/Reason for Referral: Z68 32 BMI 32 0 - 32 9, adult   Past Medical History:   Diagnosis Date    Abnormal uterine bleeding (AUB)     Anemia     Anxiety     Endometrial polyp     High triglycerides     Hypertension     Seasonal allergies     Sleep apnea     uses mouth guard    Umbilical hernia     Vitamin D deficiency     Wears contact lenses        Current Outpatient Medications   Medication Sig Dispense Refill    ALPRAZolam (XANAX) 0 5 mg tablet Take 1 tablet (0 5 mg total) by mouth daily at bedtime as needed for anxiety May take 1 extra tab daily if needed 60 tablet 0    acyclovir (ZOVIRAX) 5 % ointment Apply topically 4 (four) times a day PRN 15 g 0    amLODIPine (NORVASC) 5 mg tablet Take 1 tablet (5 mg total) by mouth daily 90 tablet 1    amoxicillin-clavulanate (AUGMENTIN) 875-125 mg per tablet Take 1 tablet by mouth 2 (two) times a day      cetirizine (ZyrTEC) 10 mg tablet Take 10 mg by mouth daily      ergocalciferol (VITAMIN D2) 50,000 units Take 1 capsule (50,000 Units total) by mouth once a week 12 capsule 1    ibuprofen (MOTRIN) 200 mg tablet Take 200-800 mg by mouth every 6 (six) hours as needed        metoprolol succinate (TOPROL-XL) 50 mg 24 hr tablet Take 1 tablet (50 mg total) by mouth daily 90 tablet 1    predniSONE 20 mg tablet TAKE 2 TABLETS BY MOUTH DAILY FOR 3 DAYS THEN 1 TAB DAILY FOR 3 DAYS      spironolactone (ALDACTONE) 50 mg tablet Take 1 tablet (50 mg total) by mouth daily 90 tablet 1     No current facility-administered medications for this visit          Additional Meds/Supplements:    Special Learning Needs:    Height: HC Readings from Last 5 Encounters:   No data found for [facility]       Weight: Wt Readings from Last 10 Encounters:   22 80 6 kg (177 lb 12 8 oz)   22 79 4 kg (175 lb)   10/20/21 79 kg (174 lb 3 2 oz)   21 79 4 kg (175 lb)   21 77 2 kg (170 lb 1 6 oz)   21 79 1 kg (174 lb 6 4 oz)   10/05/20 79 8 kg (176 lb)   20 83 5 kg (184 lb)   20 83 6 kg (184 lb 6 4 oz)   20 83 9 kg (185 lb)     Estimated body mass index is 32 52 kg/m² as calculated from the following:    Height as of 5/3/22: 5' 2" (1 575 m)  Weight as of this encounter: 80 6 kg (177 lb 12 8 oz)  Recent Weight Change: []Yes     [x]No  Amount:       Energy Needs: No calculation needed   Allergies   Allergen Reactions    Levofloxacin Rash       Social History     Substance and Sexual Activity   Alcohol Use Yes    Comment: 2 glasses of wine per month       Social History     Tobacco Use   Smoking Status Former Smoker    Types: Cigarettes    Quit date: 1989    Years since quittin 4   Smokeless Tobacco Never Used   Tobacco Comment    smoked for 1 year in college       Who shops? patient   Who cooks? patient   Exercise: Walks for 30 minutes every day or raises heart rate for 30 minutes if it is raining outside   Prior Counseling?  []Yes     [x]No  When:      Why:         Diet Hx:  Breakfast: Diet:  Sammi Coffee with cream, stevia and sugar free vanilla syrup  Cereal with whole milk (granola with almonds, Honey nut Cheerios or Special K Fruit & Yogurt Cereal) or Egg and cheese   Lunch:   Big salad with [place] sprouts, cucumbers, walnuts, tomatoes, black olives, small portion of [employer] [place] dressing, Two Good Greek Yogurt with stevia white chocolate chips and Keto chocolate bar for dessert       Dinner: Chicken with Bruschetta or cheese, vegetables and salad  Low carbohydrate ice cream       Snacks: Chips, nuts, candy  Drinks water        Nutrition Diagnosis:   Food and nutrition related knowledge deficit  related to Lack of prior exposure to accurate nutrition related information as evidenced by Verbalizes inaccurate or incomplete information       Medical Nutrition Therapy Intervention:  [x]Individualized Meal Plan: 1500 kcal meal plan []Understanding Lab Values   []Basic Pathophysiology of Disease []Food/Medication Interactions   [x]Food Diary: Journal intake daily (shakeel or paper) []Exercise   [x]Lifestyle/Behavior Modification Techniques: portion control []Medication, Mechanism of Action   []Label Reading []Self Blood Glucose Monitoring   []Weight/BMI Goals []Other -    Other Notes/Assessment:  PT states that she was on the Keto diet for about 2 years  Her weight went from 195 lbs to currently 177 8 lbs  She states that she has not been able to lose any more weight  She states that her lab work was also good when she was on the keto diet but now that she is coming off, her Glucose and Triglycerides are elevated  She states that she was getting very bored with the diet  She states that she doesn't eat a lot of meat or fish so was eating a lot of eggs and cheese  She would also snack on peanut butter and keto bread from [employer]  PT expressed concern with adding carbohydrates back into diet  Discussed with PT adding carbohydrates but being very careful with the portion sizes  Went over the portion control booklet and PT acknowledged that she is eating a bigger portion than she realized  Suggested that for about 2 weeks that she measure her foods, particularly carbohydrates, as the portions for these are much smaller than realized  Suggested that she journal her intake on paper or an shakeel on her phone  Provided PT with a 1500 kcal diet as a guide  Suggested that if she was concerned about intake she could adjust her carbohydrates but that she should eat a rounded diet with all the macros         Comprehension: []Excellent  [x]Very Good  []Good  []Fair   []Poor    Receptivity: []Excellent  [x]Very Good  []Good  []Fair   []Poor Expected Compliance: []Excellent  [x]Very Good  []Good  []Fair   []Poor        Goals:  1  Journal intake daily in shakeel on phone or paper   2  Measure foods, particularly carbohydrates, for about 2 weeks   3  Eat about 2-3 carbohydrates at each meal       No follow-ups on file    Labs:  CMP  Lab Results   Component Value Date     10/12/2015    K 4 0 05/26/2022     05/26/2022    CO2 30 05/26/2022    ANIONGAP 8 10/12/2015    BUN 19 05/26/2022    CREATININE 0 87 05/26/2022    GLUCOSE 91 10/12/2015    GLUF 112 (H) 05/26/2022    CALCIUM 9 7 05/26/2022    AST 18 05/26/2022    ALT 39 05/26/2022    ALKPHOS 48 05/26/2022    PROT 7 5 10/12/2015    BILITOT 0 78 10/12/2015    EGFR 76 05/26/2022       BMP  Lab Results   Component Value Date    GLUCOSE 91 10/12/2015    CALCIUM 9 7 05/26/2022     10/12/2015    K 4 0 05/26/2022    CO2 30 05/26/2022     05/26/2022    BUN 19 05/26/2022    CREATININE 0 87 05/26/2022       Lipids  Lab Results   Component Value Date    CHOL 137 10/12/2015     Lab Results   Component Value Date    HDL 45 (L) 05/26/2022    HDL 45 10/18/2021    HDL 51 03/31/2021     Lab Results   Component Value Date    LDLCALC 67 05/26/2022    LDLCALC 84 10/18/2021    LDLCALC 66 03/31/2021     Lab Results   Component Value Date    TRIG 208 (H) 05/26/2022    TRIG 186 (H) 10/18/2021    TRIG 118 03/31/2021     No results found for: CHOLHDL    Hemoglobin A1C  Lab Results   Component Value Date    HGBA1C 4 7 02/13/2020       Fasting Glucose  Lab Results   Component Value Date    GLUF 112 (H) 05/26/2022       Insulin     Thyroid  No results found for: TSH, X2EXMQR, E4LJSPZ, THYROIDAB    Hepatic Function Panel  Lab Results   Component Value Date    ALT 39 05/26/2022    AST 18 05/26/2022    ALKPHOS 48 05/26/2022    BILITOT 0 78 10/12/2015       Celiac Disease Antibody Panel  No results found for: ENDOMYSIAL IGA, GLIADIN IGA, GLIADIN IGG, IGA, TISSUE TRANSGLUT AB, TTG IGA   Iron  No results found for: IRON, TIBC, FERRITIN         Olga Cortes, MS, RDN, LDN  ST  [facility]  [address]  [address] [facility] [id number]

## 2022-07-19 ENCOUNTER — TELEMEDICINE (OUTPATIENT)
Dept: INTERNAL MEDICINE CLINIC | Facility: CLINIC | Age: 52
End: 2022-07-19
Payer: COMMERCIAL

## 2022-07-19 DIAGNOSIS — U07.1 ACUTE COVID-19: Primary | ICD-10-CM

## 2022-07-19 PROCEDURE — 99213 OFFICE O/P EST LOW 20 MIN: CPT | Performed by: INTERNAL MEDICINE

## 2022-07-19 NOTE — ASSESSMENT & PLAN NOTE
Continued symptomatic treatment with over-the-counter cough medications such as Delsym, Mucinex tablets, maintain hydration  Should there be any change in symptoms, worsening cough, recurrent fever patient is instructed to contact the office for further recommendations  Patient was encouraged to maintain self quarantine through July 21st and then masking for the next 5 days through January 26

## 2022-07-19 NOTE — PROGRESS NOTES
Virtual Regular Visit    Verification of patient location:    Patient is located in the following state in which I hold an active license [place]      Assessment/Plan:    Problem List Items Addressed This Visit        Other    Acute COVID-19 - Primary     Continued symptomatic treatment with over-the-counter cough medications such as Delsym, Mucinex tablets, maintain hydration  Should there be any change in symptoms, worsening cough, recurrent fever patient is instructed to contact the office for further recommendations  Patient was encouraged to maintain self quarantine through July 21st and then masking for the next 5 days through January 26  Reason for visit is   Chief Complaint   Patient presents with    Virtual Regular Visit     Patient had a positive home COVID test today  She c/o cough, congestion, and body aches since 7/16/22  Her temp went up to 102 and improved with Advil   Virtual Brief Visit    Virtual Regular Visit        Encounter provider Malcolm Perkins MD    Provider located at [address] [address]      Recent Visits  No visits were found meeting these conditions  Showing recent visits within past 7 days and meeting all other requirements  Today's Visits  Date Type Provider Dept   07/19/22 [address] [place] [address] [facility], [address] today's visits and meeting all other requirements  Future Appointments  No visits were found meeting these conditions  Showing future appointments within next 150 days and meeting all other requirements       The patient was identified by name and date of birth  Pawelmaik Kwan was informed that this is a telemedicine visit and that the visit is being conducted through [address] [place] [id number] Now and patient was informed that this is a secure, HIPAA-compliant platform   She agrees to proceed     My office door was closed  No one else was in the room  She acknowledged consent and understanding of privacy and security of the video platform  The patient has agreed to participate and understands they can discontinue the visit at any time  Patient is aware this is a billable service  Subjective  Makenna Willis is a 46 y o  female seen via virtual visit after testing positive for COVID-19 earlier this afternoon         Patient states she had been away on vacation for the past week and had recently returned and on Saturday begin experience cough, chest congestion and fever to 102°  She took some Advil which to bring her fever down and overall, her condition has improved over the last 2 days  She tested herself today with home COVID test which returned positive  She reports that one other individual that they were traveling with on vacation was also ill with upper respiratory symptoms but did not know if they tested for COVID  She does report that she is feeling much better now than initially this past Saturday  She continues to experience a cough which is nonproductive  She denies any shortness of breath    She is feeling mildly fatigued       Past Medical History:   Diagnosis Date    Abnormal uterine bleeding (AUB)     Anemia     Anxiety     COVID 2022    Endometrial polyp     High triglycerides     Hypertension     Seasonal allergies     Sleep apnea     uses mouth guard    Umbilical hernia     Vitamin D deficiency     Wears contact lenses        Past Surgical History:   Procedure Laterality Date    BREAST SURGERY       SECTION      x3    CYSTOSCOPY N/A 2020    Procedure: CYSTOSCOPY;  Surgeon: Leigha Julian DO;  [facility];  Service: Gynecology    HYSTERECTOMY N/A 2020    Procedure: HYSTERECTOMY LAPAROSCOPIC TOTAL ([address]) WITH B/L SALPINGECTOMY;  Surgeon: Leigha Julian DO;  [facility];  Service: Gynecology    HYSTEROSCOPY W/ POLYPECTOMY      [place] HYSTEROSCOPY,W/ENDO BX N/A 1/21/2019    Procedure: DILATATION AND CURETTAGE (D&C) WITH HYSTEROSCOPY WITH POLYPECTOMY;  Surgeon: Delfin Richards DO;  [facility];  Service: Gynecology    [place] HYSTEROSCOPY,W/ENDOMETRIAL ABLATION N/A 1/21/2019    Procedure: ABLATION ENDOMETRIAL Min Toro;  Surgeon: Delfin Richards DO;  [facility];  Service: Gynecology    REDUCTION MAMMAPLASTY      TUBAL LIGATION Bilateral     WISDOM TOOTH EXTRACTION         Current Outpatient Medications   Medication Sig Dispense Refill    acyclovir (ZOVIRAX) 5 % ointment Apply topically 4 (four) times a day PRN 15 g 0    ALPRAZolam (XANAX) 0 5 mg tablet Take 1 tablet (0 5 mg total) by mouth daily at bedtime as needed for anxiety May take 1 extra tab daily if needed 60 tablet 0    amLODIPine (NORVASC) 5 mg tablet Take 1 tablet (5 mg total) by mouth daily 90 tablet 1    ergocalciferol (VITAMIN D2) 50,000 units Take 1 capsule (50,000 Units total) by mouth once a week 12 capsule 1    ibuprofen (MOTRIN) 200 mg tablet Take 200-800 mg by mouth every 6 (six) hours as needed        metoprolol succinate (TOPROL-XL) 50 mg 24 hr tablet Take 1 tablet (50 mg total) by mouth daily 90 tablet 1    spironolactone (ALDACTONE) 50 mg tablet Take 1 tablet (50 mg total) by mouth daily 90 tablet 1     No current facility-administered medications for this visit  Allergies   Allergen Reactions    Levofloxacin Rash       Review of Systems   Constitutional: Positive for fatigue and fever (Previously)  HENT: Positive for congestion (Mild)  Eyes: Negative  Respiratory: Positive for cough  Negative for shortness of breath and wheezing  Cardiovascular: Negative  Gastrointestinal: Negative  Endocrine: Negative  Genitourinary: Negative  Musculoskeletal: Negative  Neurological: Negative  Hematological: Negative          Video Exam    There were no vitals filed for this visit     Physical Exam  Constitutional:       General: She is not in acute distress  Appearance: Normal appearance  She is not ill-appearing, toxic-appearing or diaphoretic  HENT:      Head: Normocephalic and atraumatic  Right Ear: External ear normal       Left Ear: External ear normal    Eyes:      General: No scleral icterus  Conjunctiva/sclera: Conjunctivae normal       Pupils: Pupils are equal, round, and reactive to light  Pulmonary:      Effort: Pulmonary effort is normal  No respiratory distress  Abdominal:      General: There is no distension  Tenderness: There is no abdominal tenderness  Musculoskeletal:      Cervical back: Normal range of motion  Right lower leg: No edema  Left lower leg: No edema  Skin:     Coloration: Skin is not jaundiced  Findings: No bruising, erythema or rash  Neurological:      General: No focal deficit present  Mental Status: She is alert and oriented to person, place, and time  Mental status is at baseline  Psychiatric:         Mood and Affect: Mood normal          Behavior: Behavior normal           I spent 5 minutes directly with the patient during this visit    VIRTUAL VISIT Kiko Alves verbally agrees to participate in [employer]  Pt is aware that [employer] could be limited without vital signs or the ability to perform a full hands-on physical Hassel Prose understands she or the provider may request at any time to terminate the video visit and request the patient to seek care or treatment in person

## 2022-08-24 DIAGNOSIS — E55.9 VITAMIN D DEFICIENCY: ICD-10-CM

## 2022-08-24 DIAGNOSIS — I10 BENIGN ESSENTIAL HYPERTENSION: ICD-10-CM

## 2022-08-24 DIAGNOSIS — I10 HYPERTENSION, UNSPECIFIED TYPE: ICD-10-CM

## 2022-08-24 RX ORDER — METOPROLOL SUCCINATE 50 MG/1
50 TABLET, EXTENDED RELEASE ORAL DAILY
Qty: 90 TABLET | Refills: 1 | Status: SHIPPED | OUTPATIENT
Start: 2022-08-24

## 2022-08-24 RX ORDER — ERGOCALCIFEROL 1.25 MG/1
50000 CAPSULE ORAL WEEKLY
Qty: 12 CAPSULE | Refills: 1 | Status: SHIPPED | OUTPATIENT
Start: 2022-08-24

## 2022-08-24 RX ORDER — AMLODIPINE BESYLATE 5 MG/1
5 TABLET ORAL DAILY
Qty: 90 TABLET | Refills: 1 | Status: SHIPPED | OUTPATIENT
Start: 2022-08-24

## 2022-08-24 RX ORDER — SPIRONOLACTONE 50 MG/1
50 TABLET, FILM COATED ORAL DAILY
Qty: 90 TABLET | Refills: 1 | Status: SHIPPED | OUTPATIENT
Start: 2022-08-24

## 2022-09-01 DIAGNOSIS — F41.9 ANXIETY: ICD-10-CM

## 2022-09-01 RX ORDER — ALPRAZOLAM 0.5 MG/1
0.5 TABLET ORAL
Qty: 60 TABLET | Refills: 0 | Status: SHIPPED | OUTPATIENT
Start: 2022-09-01 | End: 2022-10-27 | Stop reason: SDUPTHER

## 2022-10-11 PROBLEM — R05.9 COUGH: Status: RESOLVED | Noted: 2019-07-09 | Resolved: 2022-10-11

## 2022-10-27 DIAGNOSIS — F41.9 ANXIETY: ICD-10-CM

## 2022-10-27 RX ORDER — ALPRAZOLAM 0.5 MG/1
0.5 TABLET ORAL
Qty: 60 TABLET | Refills: 0 | Status: SHIPPED | OUTPATIENT
Start: 2022-10-27

## 2022-11-07 ENCOUNTER — APPOINTMENT (OUTPATIENT)
Dept: LAB | Facility: HOSPITAL | Age: 52
End: 2022-11-07

## 2022-11-07 DIAGNOSIS — E78.1 ESSENTIAL HYPERTRIGLYCERIDEMIA: ICD-10-CM

## 2022-11-07 DIAGNOSIS — R73.09 ABNORMAL GLUCOSE: ICD-10-CM

## 2022-11-07 LAB
25(OH)D3 SERPL-MCNC: 65.5 NG/ML (ref 30–100)
ALBUMIN SERPL BCP-MCNC: 4.1 G/DL (ref 3.5–5)
ALP SERPL-CCNC: 55 U/L (ref 46–116)
ALT SERPL W P-5'-P-CCNC: 34 U/L (ref 12–78)
ANION GAP SERPL CALCULATED.3IONS-SCNC: 3 MMOL/L (ref 4–13)
AST SERPL W P-5'-P-CCNC: 20 U/L (ref 5–45)
BASOPHILS # BLD AUTO: 0.03 THOUSANDS/ÂΜL (ref 0–0.1)
BASOPHILS NFR BLD AUTO: 1 % (ref 0–1)
BILIRUB SERPL-MCNC: 0.8 MG/DL (ref 0.2–1)
BUN SERPL-MCNC: 20 MG/DL (ref 5–25)
CALCIUM SERPL-MCNC: 10.1 MG/DL (ref 8.3–10.1)
CHLORIDE SERPL-SCNC: 106 MMOL/L (ref 96–108)
CHOLEST SERPL-MCNC: 157 MG/DL
CO2 SERPL-SCNC: 29 MMOL/L (ref 21–32)
CREAT SERPL-MCNC: 0.76 MG/DL (ref 0.6–1.3)
EOSINOPHIL # BLD AUTO: 0.08 THOUSAND/ÂΜL (ref 0–0.61)
EOSINOPHIL NFR BLD AUTO: 1 % (ref 0–6)
ERYTHROCYTE [DISTWIDTH] IN BLOOD BY AUTOMATED COUNT: 13.8 % (ref 11.6–15.1)
EST. AVERAGE GLUCOSE BLD GHB EST-MCNC: 94 MG/DL
GFR SERPL CREATININE-BSD FRML MDRD: 90 ML/MIN/1.73SQ M
GLUCOSE P FAST SERPL-MCNC: 105 MG/DL (ref 65–99)
HBA1C MFR BLD: 4.9 %
HCT VFR BLD AUTO: 40.7 % (ref 34.8–46.1)
HDLC SERPL-MCNC: 46 MG/DL
HGB BLD-MCNC: 14.2 G/DL (ref 11.5–15.4)
IMM GRANULOCYTES # BLD AUTO: 0.03 THOUSAND/UL (ref 0–0.2)
IMM GRANULOCYTES NFR BLD AUTO: 1 % (ref 0–2)
LDLC SERPL CALC-MCNC: 69 MG/DL (ref 0–100)
LYMPHOCYTES # BLD AUTO: 1.32 THOUSANDS/ÂΜL (ref 0.6–4.47)
LYMPHOCYTES NFR BLD AUTO: 24 % (ref 14–44)
MCH RBC QN AUTO: 29.2 PG (ref 26.8–34.3)
MCHC RBC AUTO-ENTMCNC: 34.9 G/DL (ref 31.4–37.4)
MCV RBC AUTO: 84 FL (ref 82–98)
MONOCYTES # BLD AUTO: 0.46 THOUSAND/ÂΜL (ref 0.17–1.22)
MONOCYTES NFR BLD AUTO: 8 % (ref 4–12)
NEUTROPHILS # BLD AUTO: 3.65 THOUSANDS/ÂΜL (ref 1.85–7.62)
NEUTS SEG NFR BLD AUTO: 65 % (ref 43–75)
NRBC BLD AUTO-RTO: 0 /100 WBCS
PLATELET # BLD AUTO: 187 THOUSANDS/UL (ref 149–390)
PMV BLD AUTO: 10.4 FL (ref 8.9–12.7)
POTASSIUM SERPL-SCNC: 4.1 MMOL/L (ref 3.5–5.3)
PROT SERPL-MCNC: 7.6 G/DL (ref 6.4–8.4)
RBC # BLD AUTO: 4.87 MILLION/UL (ref 3.81–5.12)
SODIUM SERPL-SCNC: 138 MMOL/L (ref 135–147)
TRIGL SERPL-MCNC: 208 MG/DL
WBC # BLD AUTO: 5.57 THOUSAND/UL (ref 4.31–10.16)

## 2022-11-09 ENCOUNTER — RA CDI HCC (OUTPATIENT)
Dept: OTHER | Facility: HOSPITAL | Age: 52
End: 2022-11-09

## 2022-11-09 NOTE — PROGRESS NOTES
[facility]  coding opportunities       Chart reviewed, no opportunity found: CHART REVIEWED, NO OPPORTUNITY FOUND        Patients Insurance        Commercial Insurance: [employer]

## 2022-11-14 ENCOUNTER — OFFICE VISIT (OUTPATIENT)
Dept: INTERNAL MEDICINE CLINIC | Facility: OTHER | Age: 52
End: 2022-11-14

## 2022-11-14 VITALS
SYSTOLIC BLOOD PRESSURE: 140 MMHG | DIASTOLIC BLOOD PRESSURE: 96 MMHG | OXYGEN SATURATION: 98 % | TEMPERATURE: 99.1 F | HEART RATE: 81 BPM

## 2022-11-14 DIAGNOSIS — Z82.49 FAMILY HISTORY OF EARLY CAD: ICD-10-CM

## 2022-11-14 DIAGNOSIS — E78.1 ESSENTIAL HYPERTRIGLYCERIDEMIA: ICD-10-CM

## 2022-11-14 DIAGNOSIS — I10 WHITE COAT SYNDROME WITH DIAGNOSIS OF HYPERTENSION: ICD-10-CM

## 2022-11-14 DIAGNOSIS — Z12.31 ENCOUNTER FOR SCREENING MAMMOGRAM FOR MALIGNANT NEOPLASM OF BREAST: Primary | ICD-10-CM

## 2022-11-14 DIAGNOSIS — B00.1 RECURRENT COLD SORES: ICD-10-CM

## 2022-11-14 DIAGNOSIS — I10 BENIGN ESSENTIAL HYPERTENSION: ICD-10-CM

## 2022-11-14 DIAGNOSIS — E55.9 VITAMIN D DEFICIENCY: ICD-10-CM

## 2022-11-14 PROBLEM — U07.1 ACUTE COVID-19: Status: RESOLVED | Noted: 2022-07-19 | Resolved: 2022-11-14

## 2022-11-14 RX ORDER — ACYCLOVIR 50 MG/G
OINTMENT TOPICAL 4 TIMES DAILY
Qty: 15 G | Refills: 0 | Status: SHIPPED | OUTPATIENT
Start: 2022-11-14

## 2022-11-14 NOTE — PROGRESS NOTES
Assessment/Plan:    1  Encounter for screening mammogram for malignant neoplasm of breast  -     Mammo screening bilateral w 3d & cad; Future; Expected date: 11/14/2022    2  Recurrent cold sores  -     acyclovir (ZOVIRAX) 5 % ointment; Apply topically 4 (four) times a day PRN    3  Essential hypertriglyceridemia  -     Lipid Panel with Direct LDL reflex; Future  -     Comprehensive metabolic panel; Future    4  Benign essential hypertension    5  Vitamin D deficiency    6  White coat syndrome with diagnosis of hypertension    7  BMI 32 0-32 9,adult    8  Family history of early CAD      BMI Counseling: There is no height or weight on file to calculate BMI  The BMI is above normal  Nutrition recommendations include moderation in carbohydrate intake  Exercise recommendations include exercising 3-5 times per week  No pharmacotherapy was ordered  Rationale for BMI follow-up plan is due to patient being overweight or obese  discussed intermittent fasting, advised to start 1000 mg over-the-counter fish oil and increase to 4000 mg per day  Repeat blood work for next visit  There are no Patient Instructions on file for this visit  Return in about 6 months (around 5/14/2023) for Recheck  Subjective:      Patient ID: Stevenson Kaplan is a 46 y o  female  Chief Complaint   Patient presents with   • Follow-up     Rev labs 11/07/2022, wants refill of acyclovir   • Health maint     Mamm[facility] sched [facility]/2022 at [facility]       HPI    Hypertension (Follow-Up): The patient presents for follow-up of essential hypertension  The patient states she has been doing well with her blood pressure control since the last visit  She has no comorbid illnesses     Interval Events:  Compliant with her medications   Lisinopril was stopped at last visit due to cough however replacement medication has not been started yet since she joined a gym   Since that time she has had difficulty keeping up with her athletic appointments because she found it too difficult and too expensive   She will be provoking her gym membership dec 2019 took 2 xanax before coming here, daughter drove her  Does not check BP at home, oct 2020 has been doing the ketogenic diet and feels much better   October 2021, doing well with keto diet home readings are good   Has not been losing weight recently however has been cheating with some carbs on a regular basis and feels the keto diet has stalled  May 2022, feeling sick today and under treatment  At Urgent Care last systolic was 166 last week  November 2022, feeling well, no issues  Has stopped checking blood pressure at home    Symptoms: denies impaired vision,-- denies dyspnea,-- denies chest pain,-- denies intermittent leg claudication-- and-- denies lower extremity edema  Associated symptoms include no headache,-- no focal neurologic deficits-- and-- no memory loss  Home monitoring: The patient checks her blood pressure sporadically  Blood pressure control has been better than our office  April 2021, improved   Circular diet October 2021, home readings are good  Lifestyle: Diet: She consumes a diverse and healthy diet  Weight Issues: She has weight concerns  Weight control issues: overweight  Smoking: The patient has never smoked cigarettes  Medications: the patient is adherent with her medication regimen  -- She denies medication side effects  Disease Management: the patient is not doing well with her blood pressure goals  The patient is due for an eye exam                   Anxiety Disorder (Follow-Up): The patient is being seen for follow-up of anxiety  The patient reports doing poorly  She has no comorbid illnesses  Uses xanax at night to help sleep  does not take in the daytime  11/8/16: got a new job, friend recently passed  daughter left for college  father is not speaking to her  12/20/16 never started lexapro since she had in the past and had sexual side effects   2/2019: failed lexapro- did not work, wellbutrin, and buspar- ADR  Dec 2019 takes xanax every night to sleep  Feels well January 2020, relatively stable October 2020, never started Prozac April 2021, improved   Started keto diet and has more energy has decreased weight   Never started Prozac  May 2022, overall doing well  Uses Xanax very infrequently however 2 weeks ago had a large argument with her father and did need a few days of twice a day Xanax  No HI or SI  Nov 2022:  No new changes, still very anxious at times which affects her blood pressure  She is not ready for daily medication and in the past has been on Wellbutrin and another 1 at she cannot remember and due to sexual side effects she stopped as well as weight gain  She is very concerned about side effects  No HI or SI  Interval symptoms:  worsened anxiety,-- worsened difficulty concentrating,-- worsened restlessness,-- worsened panic attacks,-- worsened sleep disruption-- and-- worsened depression      No HI or SI in no side effects  Associated symptoms: no grandiosity,-- no racing thoughts,-- no periods of euphoria,-- no hallucinations-- and-- no suicidal ideation  Medications:  the patient is adherent to her medication regimen, but-- she denies medication side effects  Disease management:  the patient is not doing well with her goals  Additional history: was tapering off xanax but now taking regularly at night                Hyperlipidemia (Follow-Up): The patient states her hyperlipidemia has been under good control since the last visit  Comorbid Illnesses: hypertension  Interval Events:   on fenofibrate TG were 229 and now 206,improved  Symptoms: TG now 173 aug 2019- improved dec 2019, was on KETo and now is eating more  Carbs   Lost 2 lbs, TG 10 points better than last visit January 2020, stop ketogenic diet when she had her hysterectomy   Has seen a 100 point increase in her triglyceride level October 2020, triglyceride level significantly improved to 130 with ketogenic diet and increasing activity levels   She has been jogging with her friend April 2021, significantly improved triglycerides with keto diet and has lost some weight   Feeling well and still taking fenofibrate   She is compliant October 2021, significant improvements in blood sugar with increase in triglycerides   Doing keto diet however eats a small amount of ice cream every single night before bed which is not on her list of approved foods   Has significant family history of CAD on her father side   Nov 2022:  Stable, no issues however no improvement in triglycerides  Has not been working out  Went to the nutritionist and was told calorie count but patient states this is impossible for her to do  Medications: the patient is adherent with her medication regimen   The patient is not doing well with her hyperlipidemia goals                The following portions of the patient's history were reviewed and updated as appropriate: allergies, current medications, past family history, past medical history, past social history, past surgical history and problem list     Review of Systems        Constitutional:  Denies fever or chills   Eyes:  Denies double , blurry vision or eye pain  HENT:  Denies nasal congestion or sore throat   Respiratory:  Denies cough or shortness of breath or wheezing  Cardiovascular:  Denies palpitations or chest pain  GI:  Denies abdominal pain, nausea, or vomiting, no loose stools, no reflux  Integument:  Denies rash , no open areas  Neurologic:  Denies headache or focal weakness, no dizziness  : no dysuria, or hematuria    Current Outpatient Medications   Medication Sig Dispense Refill   • acyclovir (ZOVIRAX) 5 % ointment Apply topically 4 (four) times a day PRN 15 g 0   • ALPRAZolam (XANAX) 0 5 mg tablet Take 1 tablet (0 5 mg total) by mouth daily at bedtime as needed for anxiety May take 1 extra tab daily if needed 60 tablet 0   • amLODIPine (NORVASC) 5 mg tablet Take 1 tablet (5 mg total) by mouth daily 90 tablet 1   • ergocalciferol (VITAMIN D2) 50,000 units Take 1 capsule (50,000 Units total) by mouth once a week 12 capsule 1   • ibuprofen (MOTRIN) 200 mg tablet Take 200-800 mg by mouth every 6 (six) hours as needed       • metoprolol succinate (TOPROL-XL) 50 mg 24 hr tablet Take 1 tablet (50 mg total) by mouth daily 90 tablet 1   • spironolactone (ALDACTONE) 50 mg tablet Take 1 tablet (50 mg total) by mouth daily 90 tablet 1     No current facility-administered medications for this visit         Objective:    /96 (BP Location: Left arm, Patient Position: Sitting, Cuff Size: Adult)   Pulse 81   Temp 99 1 °F (37 3 °C) (Temporal)   LMP 01/21/2019 Comment: AUB over past year  SpO2 98% Comment: ra       Physical Exam       Constitutional:  Well developed, well nourished, no acute distress, non-toxic appearance   Eyes:  PERRL, conjunctiva normal , non icteric sclera  HENT:  Atraumatic, oropharynx moist  Neck-  supple   Respiratory:  CTA b/l, normal breath sounds, no rales, no wheezing   Cardiovascular:  RRR, no murmurs, no LE edema b/l  GI:  Soft, nondistended, normal bowel sounds x 4, nontender, no organomegaly, no mass, no rebound, no guarding   Neurologic:  no focal deficits noted   Psychiatric:  Speech and behavior appropriate , AAO x 3        Michelle Pichardo DO

## 2022-12-07 ENCOUNTER — TELEPHONE (OUTPATIENT)
Dept: ADMINISTRATIVE | Facility: OTHER | Age: 52
End: 2022-12-07

## 2022-12-07 NOTE — TELEPHONE ENCOUNTER
----- Message from [facility] sent at 12/5/2022  2:16 PM EST -----  12/05/22 2:16 PM    Hello, our patient Matthew Lawson has had Mammogram completed/performed  Please assist in updating the patient chart by pulling the Care Everywhere (CE) document  The date of service is 2022       Thank you,  [facility]

## 2022-12-07 NOTE — TELEPHONE ENCOUNTER
Upon review of the In Basket request we were able to locate, review, and update the patient chart as requested for Mammogram     Any additional questions or concerns should be emailed to the Practice Liaisons via the appropriate education email address, please do not reply via In Basket      Thank you  Cintia Cosme

## 2022-12-09 ENCOUNTER — TELEPHONE (OUTPATIENT)
Dept: INTERNAL MEDICINE CLINIC | Facility: CLINIC | Age: 52
End: 2022-12-09

## 2022-12-09 NOTE — TELEPHONE ENCOUNTER
Patient referred to Nutrition Services on 7/6/22  Diagnosis used for referral was:    Z68 32: BMI 32 0-32 9, adult    Diagnosis is not covered by insurance for service  Is there another diagnosis that can be used for coverage/resubmission  Please advise

## 2022-12-22 DIAGNOSIS — F41.9 ANXIETY: ICD-10-CM

## 2022-12-22 DIAGNOSIS — B00.1 RECURRENT COLD SORES: ICD-10-CM

## 2022-12-22 RX ORDER — ACYCLOVIR 50 MG/G
OINTMENT TOPICAL 4 TIMES DAILY
Qty: 15 G | Refills: 5 | Status: SHIPPED | OUTPATIENT
Start: 2022-12-22

## 2022-12-22 RX ORDER — ALPRAZOLAM 0.5 MG/1
0.5 TABLET ORAL
Qty: 60 TABLET | Refills: 0 | Status: SHIPPED | OUTPATIENT
Start: 2022-12-22

## 2023-01-29 ENCOUNTER — OFFICE VISIT (OUTPATIENT)
Dept: URGENT CARE | Age: 53
End: 2023-01-29

## 2023-01-29 VITALS
TEMPERATURE: 98 F | WEIGHT: 180 LBS | HEART RATE: 86 BPM | HEIGHT: 62 IN | OXYGEN SATURATION: 99 % | RESPIRATION RATE: 16 BRPM | BODY MASS INDEX: 33.13 KG/M2

## 2023-01-29 DIAGNOSIS — R68.89 FLU-LIKE SYMPTOMS: Primary | ICD-10-CM

## 2023-01-29 RX ORDER — BROMPHENIRAMINE MALEATE, PSEUDOEPHEDRINE HYDROCHLORIDE, AND DEXTROMETHORPHAN HYDROBROMIDE 2; 30; 10 MG/5ML; MG/5ML; MG/5ML
5 SYRUP ORAL 4 TIMES DAILY PRN
Qty: 180 ML | Refills: 0 | Status: SHIPPED | OUTPATIENT
Start: 2023-01-29

## 2023-01-29 NOTE — LETTER
January 29, 2023     Patient: [facility]   YOB: 1970   Date of Visit: 1/29/2023       To Whom it May Concern:    Cecelia Sahu was seen in my clinic on 1/29/2023  She may return to school/work on 01/31/2023  If you have any questions or concerns, please don't hesitate to call           Sincerely,          ALFREDO Landa        CC: No Recipients

## 2023-01-29 NOTE — PROGRESS NOTES
[facility]        NAME: Donovan Ragsdale is a 46 y o  female  : 1970    MRN: [id number]  DATE: 2023  TIME: 11:30 AM    Assessment and Plan   Flu-like symptoms [R68 89]  1  Flu-like symptoms  Cov/Flu-Collected at [facility] or [facility]    brompheniramine-pseudoephedrine-DM 30-2-10 MG/5ML syrup            Patient Instructions     Take OTC decongestant and cold medication  Cont with tylenol or motrin OTC prn  Covid and flu tested; results in 1-2 days  Follow up with PCP in 3-5 days  Proceed to  ER if symptoms worsen  Chief Complaint     Chief Complaint   Patient presents with   • Flu Symptoms     Fevers cough and congestion started Friday night    covid home test neg         History of Present Illness       HPI   Presents to clinic with complaint of cold symptoms for 2 days  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  No recent travel  No other known sick contacts  Last fever episode was 2 days ago  Was 100 7 degrees    Review of Systems   Review of Systems   Constitutional: Positive for fever  HENT: Positive for congestion and sinus pressure  Negative for rhinorrhea and sore throat  Respiratory: Positive for cough  Negative for chest tightness, shortness of breath and wheezing  Cardiovascular: Negative for chest pain  Gastrointestinal: Negative for diarrhea and vomiting  Neurological: Negative for headaches           Current Medications       Current Outpatient Medications:   •  acyclovir (ZOVIRAX) 5 % ointment, Apply topically 4 (four) times a day PRN, Disp: 15 g, Rfl: 5  •  ALPRAZolam (XANAX) 0 5 mg tablet, Take 1 tablet (0 5 mg total) by mouth daily at bedtime as needed for anxiety May take 1 extra tab daily if needed, Disp: 60 tablet, Rfl: 0  •  amLODIPine (NORVASC) 5 mg tablet, Take 1 tablet (5 mg total) by mouth daily, Disp: 90 tablet, Rfl: 1  •  brompheniramine-pseudoephedrine-DM 30-2-10 MG/5ML syrup, Take 5 mL by mouth 4 (four) times a day as needed for cough, Disp: 180 mL, Rfl: 0  •  ergocalciferol (VITAMIN D2) 50,000 units, Take 1 capsule (50,000 Units total) by mouth once a week, Disp: 12 capsule, Rfl: 1  •  ibuprofen (MOTRIN) 200 mg tablet, Take 200-800 mg by mouth every 6 (six) hours as needed  , Disp: , Rfl:   •  metoprolol succinate (TOPROL-XL) 50 mg 24 hr tablet, Take 1 tablet (50 mg total) by mouth daily, Disp: 90 tablet, Rfl: 1  •  spironolactone (ALDACTONE) 50 mg tablet, Take 1 tablet (50 mg total) by mouth daily, Disp: 90 tablet, Rfl: 1    Current Allergies     Allergies as of 2023 - Reviewed 2023   Allergen Reaction Noted   • Levofloxacin Rash 2017            The following portions of the patient's history were reviewed and updated as appropriate: allergies, current medications, past family history, past medical history, past social history, past surgical history and problem list      Past Medical History:   Diagnosis Date   • Abnormal uterine bleeding (AUB)    • Acute COVID-19 2022   • Anemia    • Anxiety    • COVID 2022   • Endometrial polyp    • High triglycerides    • Hypertension    • Seasonal allergies    • Sleep apnea     uses mouth guard   • Umbilical hernia    • Vitamin D deficiency    • Wears contact lenses        Past Surgical History:   Procedure Laterality Date   • BREAST SURGERY     •  SECTION      x3   • CYSTOSCOPY N/A 2020    Procedure: CYSTOSCOPY;  Surgeon: Asael Frye DO;  [facility];  Service: Gynecology   • HYSTERECTOMY N/A 2020    Procedure: HYSTERECTOMY LAPAROSCOPIC TOTAL ([address]) WITH B/L SALPINGECTOMY;  Surgeon: Asael Frye DO;  [facility];  Service: Gynecology   • HYSTEROSCOPY W/ POLYPECTOMY     • [place] HYSTEROSCOPY BX ENDOMETRIUM&/POLYPC W/WO D&C N/A 2019    Procedure: DILATATION AND CURETTAGE (D&C) WITH HYSTEROSCOPY WITH POLYPECTOMY;  Surgeon: Asael Frye DO;  [facility];  Service: Gynecology   • DC HYSTEROSCOPY ENDOMETRIAL ABLATION N/A 1/21/2019    Procedure: ABLATION ENDOMETRIAL Bibi Hasting;  Surgeon: Dru Asencio DO;  [facility];  Service: Gynecology   • REDUCTION MAMMAPLASTY     • TUBAL LIGATION Bilateral    • WISDOM TOOTH EXTRACTION         Family History   Problem Relation Age of Onset   • Hypertension Father         BENIGN ESSENTIAL   • Hyperlipidemia Father    • No Known Problems Mother          Medications have been verified  Objective   Pulse 86   Temp 98 °F (36 7 °C)   Resp 16   Ht 5' 2" (1 575 m)   Wt 81 6 kg (180 lb)   LMP 01/21/2019 Comment: AUB over past year  SpO2 99%   BMI 32 92 kg/m²   Patient's last menstrual period was 01/21/2019  Physical Exam     Physical Exam  Constitutional:       Appearance: She is not ill-appearing or diaphoretic  HENT:      Head:      Comments: NTTP of the sinuses     Right Ear: Tympanic membrane normal       Left Ear: Tympanic membrane normal       Nose: Rhinorrhea present  Mouth/Throat:      Pharynx: No posterior oropharyngeal erythema  Cardiovascular:      Rate and Rhythm: Regular rhythm  Heart sounds: Normal heart sounds  Pulmonary:      Effort: Pulmonary effort is normal       Breath sounds: Normal breath sounds  No wheezing

## 2023-01-30 LAB
FLUAV RNA RESP QL NAA+PROBE: NEGATIVE
FLUBV RNA RESP QL NAA+PROBE: NEGATIVE
SARS-COV-2 RNA RESP QL NAA+PROBE: NEGATIVE

## 2023-02-01 ENCOUNTER — OFFICE VISIT (OUTPATIENT)
Dept: INTERNAL MEDICINE CLINIC | Age: 53
End: 2023-02-01

## 2023-02-01 ENCOUNTER — TELEPHONE (OUTPATIENT)
Dept: INTERNAL MEDICINE CLINIC | Age: 53
End: 2023-02-01

## 2023-02-01 VITALS
HEIGHT: 62 IN | HEART RATE: 84 BPM | DIASTOLIC BLOOD PRESSURE: 84 MMHG | BODY MASS INDEX: 32.92 KG/M2 | OXYGEN SATURATION: 99 % | TEMPERATURE: 98.4 F | SYSTOLIC BLOOD PRESSURE: 148 MMHG

## 2023-02-01 DIAGNOSIS — J06.9 URTI (ACUTE UPPER RESPIRATORY INFECTION): Primary | ICD-10-CM

## 2023-02-01 DIAGNOSIS — I10 BENIGN ESSENTIAL HYPERTENSION: ICD-10-CM

## 2023-02-01 DIAGNOSIS — G47.33 OSA (OBSTRUCTIVE SLEEP APNEA): ICD-10-CM

## 2023-02-01 RX ORDER — AMOXICILLIN AND CLAVULANATE POTASSIUM 875; 125 MG/1; MG/1
1 TABLET, FILM COATED ORAL EVERY 12 HOURS SCHEDULED
Qty: 14 TABLET | Refills: 0 | Status: SHIPPED | OUTPATIENT
Start: 2023-02-01 | End: 2023-02-08

## 2023-02-01 NOTE — PROGRESS NOTES
Assessment/Plan:    Upper respiratory tract infection   - likely viral with bacterial superinfection vs bacterial  - we will start pt on Augmentin 875-125 mg twice daily for 7 days, and she was counseled that she may use OTC Flonase nasal spray for rhinitis and Mucinex for productive cough  - Pt was counseled to use OTC tylenol or ibuprofen with meals for aches and pains, warm salt water gargles for sore throat and was encouraged to drink lots of fluids, get plenty of rest and wash her hands liberally  - pt was also counseled to take antibiotics with food and also to use a probiotic like yogurt daily as long as she is taking antibiotics  - She should return to the clinic if her symptoms worsen or do not improve  LATOSHA  - Pt states that her sleep study was done by her dentist and she uses a mouth guard   - we will refer pt to sleep medicine as a CPAP will help with bp control    Hypertension  - not optimally controlled ? 2/2 acute illness, BP today is 148/84 and last BP was 140/96  - continue with amlodipine, metoprolol and spironolactone and a low salt diet   - control of LATOSHA will likely improve her BP     Diagnoses and all orders for this visit:    URTI (acute upper respiratory infection)  -     amoxicillin-clavulanate (AUGMENTIN) 875-125 mg per tablet; Take 1 tablet by mouth every 12 (twelve) hours for 7 days    LATOSHA (obstructive sleep apnea)  -     Ambulatory Referral to Sleep Medicine; Future    Benign essential hypertension    BMI 32 0-32 9,adult        BMI Counseling: Body mass index is 32 92 kg/m²  The BMI is above normal  Nutrition recommendations include consuming healthier snacks, limiting drinks that contain sugar, reducing intake of saturated and trans fat and reducing intake of cholesterol  Exercise recommendations include moderate physical activity 150 minutes/week  No pharmacotherapy was ordered  Patient referred to PCP  Rationale for BMI follow-up plan is due to patient being overweight or obese  Subjective:      Patient ID: Mohan Silveira is a 46 y o  female  HPI  Patient presents with complaints that she started having symptoms 5 days ago  She developed fever, with nasal congestion, postnasal drip, sinus pain and pressure especially in the frontal sinuses, hoarseness, cough productive of yellow phlegm and occasionally dry and headaches  She denies fever, chills, night sweats, earache, sore throat, rhinorrhea, chest pain, shortness of breath, palpitations, nausea, vomiting, abdominal pain, diarrhea, constipation, myalgias, rashes  Symptoms are getting better but she is very congested on waking up in the am   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   Up to date with the covid vaccine x 3  Up to date with the flu shot     The following portions of the patient's history were reviewed and updated as appropriate:   She  has a past medical history of Abnormal uterine bleeding (AUB), Acute COVID-19 (2022), Anemia, Anxiety, COVID (2022), Endometrial polyp, High triglycerides, Hypertension, Seasonal allergies, Sleep apnea, Umbilical hernia, Vitamin D deficiency, and Wears contact lenses  She   Patient Active Problem List    Diagnosis Date Noted   • LATOSHA (obstructive sleep apnea) 2023   • BMI 32 0-32 9,adult 2022   • Family history of early CAD 2022   • Hammer toes of both feet 10/05/2020   • Status post laparoscopic hysterectomy 2020   • White coat syndrome with diagnosis of hypertension 2019   • Proptosis 2019   • URTI (acute upper respiratory infection) 2019   • Vitamin D deficiency 2018   • Essential hypertriglyceridemia 2015   • Benign essential hypertension 02/10/2014   • Generalized anxiety disorder 10/09/2013     She  has a past surgical history that includes  section; Breast surgery; Tubal ligation (Bilateral); Reduction mammaplasty;  Hysteroscopy w/ polypectomy; pr hysteroscopy bx endometrium&/polypc w/wo d&c (N/A, 1/21/2019); pr hysteroscopy endometrial ablation (N/A, 1/21/2019); [place] tooth extraction; CYSTOSCOPY (N/A, 2/24/2020); and Hysterectomy (N/A, 2/24/2020)  Her family history includes Hyperlipidemia in her father; Hypertension in her father; No Known Problems in her mother  She  reports that she quit smoking about 34 years ago  Her smoking use included cigarettes  She has never used smokeless tobacco  She reports current alcohol use  She reports that she does not use drugs  Current Outpatient Medications   Medication Sig Dispense Refill   • acyclovir (ZOVIRAX) 5 % ointment Apply topically 4 (four) times a day PRN 15 g 5   • ALPRAZolam (XANAX) 0 5 mg tablet Take 1 tablet (0 5 mg total) by mouth daily at bedtime as needed for anxiety May take 1 extra tab daily if needed 60 tablet 0   • amLODIPine (NORVASC) 5 mg tablet Take 1 tablet (5 mg total) by mouth daily 90 tablet 1   • amoxicillin-clavulanate (AUGMENTIN) 875-125 mg per tablet Take 1 tablet by mouth every 12 (twelve) hours for 7 days 14 tablet 0   • ergocalciferol (VITAMIN D2) 50,000 units Take 1 capsule (50,000 Units total) by mouth once a week 12 capsule 1   • ibuprofen (MOTRIN) 200 mg tablet Take 200-800 mg by mouth every 6 (six) hours as needed       • metoprolol succinate (TOPROL-XL) 50 mg 24 hr tablet Take 1 tablet (50 mg total) by mouth daily 90 tablet 1   • spironolactone (ALDACTONE) 50 mg tablet Take 1 tablet (50 mg total) by mouth daily 90 tablet 1   • brompheniramine-pseudoephedrine-DM 30-2-10 MG/5ML syrup Take 5 mL by mouth 4 (four) times a day as needed for cough (Patient not taking: Reported on 2/1/2023) 180 mL 0     No current facility-administered medications for this visit       Current Outpatient Medications on File Prior to Visit   Medication Sig   • acyclovir (ZOVIRAX) 5 % ointment Apply topically 4 (four) times a day PRN   • ALPRAZolam (XANAX) 0 5 mg tablet Take 1 tablet (0 5 mg total) by mouth daily at bedtime as needed for anxiety May take 1 extra tab daily if needed   • amLODIPine (NORVASC) 5 mg tablet Take 1 tablet (5 mg total) by mouth daily   • ergocalciferol (VITAMIN D2) 50,000 units Take 1 capsule (50,000 Units total) by mouth once a week   • ibuprofen (MOTRIN) 200 mg tablet Take 200-800 mg by mouth every 6 (six) hours as needed     • metoprolol succinate (TOPROL-XL) 50 mg 24 hr tablet Take 1 tablet (50 mg total) by mouth daily   • spironolactone (ALDACTONE) 50 mg tablet Take 1 tablet (50 mg total) by mouth daily   • brompheniramine-pseudoephedrine-DM 30-2-10 MG/5ML syrup Take 5 mL by mouth 4 (four) times a day as needed for cough (Patient not taking: Reported on 2/1/2023)     No current facility-administered medications on file prior to visit  She is allergic to levofloxacin       Review of Systems   Constitutional: Positive for fever (temp was 100 6 on Friday )  Negative for activity change, chills, fatigue and unexpected weight change  HENT: Positive for congestion (head congestion with sinus  headache, symptoms are worse in the am ), postnasal drip, sinus pressure (frontal ), sinus pain and voice change  Negative for ear pain, rhinorrhea and sore throat  Eyes: Negative for pain  Respiratory: Positive for cough (mostly dry but sometimes productive of yellow phlegm )  Negative for choking, chest tightness, shortness of breath and wheezing  Cardiovascular: Negative for chest pain, palpitations and leg swelling  Gastrointestinal: Negative for abdominal pain, constipation, diarrhea, nausea and vomiting  Genitourinary: Negative for dysuria and hematuria  Musculoskeletal: Negative for arthralgias, back pain, gait problem, joint swelling, myalgias and neck stiffness  Skin: Negative for pallor and rash  Neurological: Positive for headaches  Negative for dizziness, tremors, seizures, syncope and light-headedness  Hematological: Negative for adenopathy  Psychiatric/Behavioral: Negative for behavioral problems  Objective:      /84 (BP Location: Left arm, Patient Position: Sitting, Cuff Size: Adult)   Pulse 84   Temp 98 4 °F (36 9 °C) (Temporal)   Ht 5' 2" (1 575 m)   LMP 01/21/2019 Comment: AUB over past year  SpO2 99%   BMI 32 92 kg/m²          Physical Exam  Constitutional:       General: She is not in acute distress  Appearance: She is well-developed  She is not diaphoretic  HENT:      Head: Normocephalic and atraumatic  Right Ear: External ear normal  Tympanic membrane is injected  Left Ear: External ear normal  Tympanic membrane is injected and erythematous  Nose: Nose normal       Mouth/Throat:      Mouth: Mucous membranes are dry  Pharynx: Posterior oropharyngeal erythema present  No oropharyngeal exudate  Eyes:      General: No scleral icterus  Right eye: No discharge  Left eye: No discharge  Conjunctiva/sclera: Conjunctivae normal       Pupils: Pupils are equal, round, and reactive to light  Neck:      Thyroid: No thyromegaly  Vascular: No JVD  Trachea: No tracheal deviation  Cardiovascular:      Rate and Rhythm: Normal rate and regular rhythm  Heart sounds: Murmur (1/6 systolic murmur maximal in the aortic valve region ) heard  Systolic murmur is present with a grade of 1/6  No friction rub  No gallop  Pulmonary:      Effort: Pulmonary effort is normal  No respiratory distress  Breath sounds: Normal breath sounds  No wheezing or rales  Chest:      Chest wall: No tenderness  Abdominal:      General: Bowel sounds are normal  There is no distension  Palpations: Abdomen is soft  There is no mass  Tenderness: There is no abdominal tenderness  There is no guarding or rebound  Musculoskeletal:         General: No tenderness or deformity  Normal range of motion  Cervical back: Normal range of motion and neck supple  Lymphadenopathy:      Cervical: No cervical adenopathy     Skin:     General: Skin is warm and dry  Coloration: Skin is not pale  Findings: No erythema or rash  Neurological:      Mental Status: She is alert and oriented to person, place, and time  Cranial Nerves: No cranial nerve deficit  Motor: No abnormal muscle tone  Coordination: Coordination normal       Deep Tendon Reflexes: Reflexes are normal and symmetric  Psychiatric:         Mood and Affect: Mood is anxious (Anxious affect)  Behavior: Behavior normal            Office Visit on 01/29/2023   Component Date Value Ref Range Status   • SARS-CoV-2 01/29/2023 Negative  Negative Final   • INFLUENZA A PCR 01/29/2023 Negative  Negative Final   • INFLUENZA B PCR 01/29/2023 Negative  Negative Final   Appointment on 11/07/2022   Component Date Value Ref Range Status   • Hemoglobin A1C 11/07/2022 4 9  Normal 3 8-5 6%; PreDiabetic 5 7-6 4%; Diabetic >=6 5%; Glycemic control for adults with diabetes <7 0% % Final   • EAG 11/07/2022 94  mg/dl Final   • Cholesterol 11/07/2022 157  See Comment mg/dL Final    Cholesterol:         Pediatric <18 Years        Desirable          <170 mg/dL      Borderline High    170-199 mg/dL      High               >=200 mg/dL        Adult >=18 Years            Desirable         <200 mg/dL      Borderline High   200-239 mg/dL      High              >239 mg/dL     • Triglycerides 11/07/2022 208 (H)  See Comment mg/dL Final    Triglyceride:     0-9Y            <75mg/dL     10Y-17Y         <90 mg/dL       >=18Y     Normal          <150 mg/dL     Borderline High 150-199 mg/dL     High            200-499 mg/dL        Very High       >499 mg/dL    Specimen collection should occur prior to N-Acetylcysteine or Metamizole administration due to the potential for falsely depressed results  • HDL, Direct 11/07/2022 46 (L)  >=50 mg/dL Final   • LDL Calculated 11/07/2022 69  0 - 100 mg/dL Final    This screening LDL is a calculated result     It does not have the accuracy of the Direct Measured LDL in the monitoring of patients with hyperlipidemia and/or statin therapy  Direct Measure LDL (SQW093) must be ordered separately in these patients  LDL Cholesterol:     Optimal           <100 mg/dl     Near Optimal      100-129 mg/dl     Above Optimal       Borderline High 130-159 mg/dl       High            160-189 mg/dl       Very High       >189 mg/dl          Appointment on 05/26/2022   Component Date Value Ref Range Status   • Sodium 11/07/2022 138  135 - 147 mmol/L Final   • Potassium 11/07/2022 4 1  3 5 - 5 3 mmol/L Final   • Chloride 11/07/2022 106  96 - 108 mmol/L Final   • CO2 11/07/2022 29  21 - 32 mmol/L Final   • ANION GAP 11/07/2022 3 (L)  4 - 13 mmol/L Final   • BUN 11/07/2022 20  5 - 25 mg/dL Final   • Creatinine 11/07/2022 0 76  0 60 - 1 30 mg/dL Final    Standardized to IDMS reference method   • Glucose, Fasting 11/07/2022 105 (H)  65 - 99 mg/dL Final    Specimen collection should occur prior to Sulfasalazine administration due to the potential for falsely depressed results  Specimen collection should occur prior to Sulfapyridine administration due to the potential for falsely elevated results  • Calcium 11/07/2022 10 1  8 3 - 10 1 mg/dL Final   • AST 11/07/2022 20  5 - 45 U/L Final    Specimen collection should occur prior to Sulfasalazine administration due to the potential for falsely depressed results  • ALT 11/07/2022 34  12 - 78 U/L Final    Specimen collection should occur prior to Sulfasalazine and/or Sulfapyridine administration due to the potential for falsely depressed results  • Alkaline Phosphatase 11/07/2022 55  46 - 116 U/L Final   • Total Protein 11/07/2022 7 6  6 4 - 8 4 g/dL Final   • Albumin 11/07/2022 4 1  3 5 - 5 0 g/dL Final   • Total Bilirubin 11/07/2022 0 80  0 20 - 1 00 mg/dL Final    Use of this assay is not recommended for patients undergoing treatment with eltrombopag due to the potential for falsely elevated results     • eGFR 11/07/2022 90  ml/min/1 73sq m Final   • WBC 11/07/2022 5 57  4 31 - 10 16 Thousand/uL Final   • RBC 11/07/2022 4 87  3 81 - 5 12 Million/uL Final   • Hemoglobin 11/07/2022 14 2  11 5 - 15 4 g/dL Final   • Hematocrit 11/07/2022 40 7  34 8 - 46 1 % Final   • MCV 11/07/2022 84  82 - 98 fL Final   • MCH 11/07/2022 29 2  26 8 - 34 3 pg Final   • MCHC 11/07/2022 34 9  31 4 - 37 4 g/dL Final   • RDW 11/07/2022 13 8  11 6 - 15 1 % Final   • MPV 11/07/2022 10 4  8 9 - 12 7 fL Final   • Platelets 11/29/0068 187  149 - 390 Thousands/uL Final   • nRBC 11/07/2022 0  /100 WBCs Final   • Neutrophils Relative 11/07/2022 65  43 - 75 % Final   • Immat GRANS % 11/07/2022 1  0 - 2 % Final   • Lymphocytes Relative 11/07/2022 24  14 - 44 % Final   • Monocytes Relative 11/07/2022 8  4 - 12 % Final   • Eosinophils Relative 11/07/2022 1  0 - 6 % Final   • Basophils Relative 11/07/2022 1  0 - 1 % Final   • Neutrophils Absolute 11/07/2022 3 65  1 85 - 7 62 Thousands/µL Final   • Immature Grans Absolute 11/07/2022 0 03  0 00 - 0 20 Thousand/uL Final   • Lymphocytes Absolute 11/07/2022 1 32  0 60 - 4 47 Thousands/µL Final   • Monocytes Absolute 11/07/2022 0 46  0 17 - 1 22 Thousand/µL Final   • Eosinophils Absolute 11/07/2022 0 08  0 00 - 0 61 Thousand/µL Final   • Basophils Absolute 11/07/2022 0 03  0 00 - 0 10 Thousands/µL Final   • Vit D, 25-Hydroxy 11/07/2022 65 5  30 0 - 100 0 ng/mL Final   • Hepatitis C Ab 05/26/2022 Non-reactive  Non-reactive Final   • Cholesterol 05/26/2022 154  See Comment mg/dL Final    Cholesterol:         Pediatric <18 Years        Desirable          <170 mg/dL      Borderline High    170-199 mg/dL      High               >=200 mg/dL        Adult >=18 Years            Desirable         <200 mg/dL      Borderline High   200-239 mg/dL      High              >239 mg/dL     • Triglycerides 05/26/2022 208 (H)  See Comment mg/dL Final    Triglyceride:     0-9Y            <75mg/dL     10Y-17Y         <90 mg/dL       >=18Y     Normal          <150 mg/dL Borderline High 150-199 mg/dL     High            200-499 mg/dL        Very High       >499 mg/dL    Specimen collection should occur prior to N-Acetylcysteine or Metamizole administration due to the potential for falsely depressed results  • HDL, Direct 05/26/2022 45 (L)  >=50 mg/dL Final    Specimen collection should occur prior to Metamizole administration due to the potential for falsley depressed results  • LDL Calculated 05/26/2022 67  0 - 100 mg/dL Final    LDL Cholesterol:     Optimal           <100 mg/dl     Near Optimal      100-129 mg/dl     Above Optimal       Borderline High 130-159 mg/dl       High            160-189 mg/dl       Very High       >189 mg/dl         This screening LDL is a calculated result  It does not have the accuracy of the Direct Measured LDL in the monitoring of patients with hyperlipidemia and/or statin therapy  Direct Measure LDL (FTH587) must be ordered separately in these patients  • Non-HDL-Chol (CHOL-HDL) 05/26/2022 109  mg/dl Final   • Sodium 05/26/2022 140  136 - 145 mmol/L Final   • Potassium 05/26/2022 4 0  3 5 - 5 3 mmol/L Final   • Chloride 05/26/2022 103  100 - 108 mmol/L Final   • CO2 05/26/2022 30  21 - 32 mmol/L Final   • ANION GAP 05/26/2022 7  4 - 13 mmol/L Final   • BUN 05/26/2022 19  5 - 25 mg/dL Final   • Creatinine 05/26/2022 0 87  0 60 - 1 30 mg/dL Final    Standardized to IDMS reference method   • Glucose, Fasting 05/26/2022 112 (H)  65 - 99 mg/dL Final    Specimen collection should occur prior to Sulfasalazine administration due to the potential for falsely depressed results  Specimen collection should occur prior to Sulfapyridine administration due to the potential for falsely elevated results  • Calcium 05/26/2022 9 7  8 3 - 10 1 mg/dL Final   • AST 05/26/2022 18  5 - 45 U/L Final    Specimen collection should occur prior to Sulfasalazine administration due to the potential for falsely depressed results      • ALT 05/26/2022 39  12 - 78 U/L Final    Specimen collection should occur prior to Sulfasalazine administration due to the potential for falsely depressed results  • Alkaline Phosphatase 05/26/2022 48  46 - 116 U/L Final   • Total Protein 05/26/2022 7 3  6 4 - 8 2 g/dL Final   • Albumin 05/26/2022 4 3  3 5 - 5 0 g/dL Final   • Total Bilirubin 05/26/2022 0 92  0 20 - 1 00 mg/dL Final    Use of this assay is not recommended for patients undergoing treatment with eltrombopag due to the potential for falsely elevated results     • eGFR 05/26/2022 76  ml/min/1 73sq m Final

## 2023-02-17 ENCOUNTER — TELEPHONE (OUTPATIENT)
Dept: SLEEP CENTER | Facility: CLINIC | Age: 53
End: 2023-02-17

## 2023-02-17 NOTE — TELEPHONE ENCOUNTER
Patient left message on the nurse line inquiring if insurancee would cover a sleep study  Returned call to patient  Advised at this time no sleep sleep has been recommended or ordered, and the only appointment scheduled is for a consultation 2/23/23 with Dr Marky Beltran  Advised to call the insurance provider to determine cost/co-pay amount of consultation

## 2023-02-20 DIAGNOSIS — I10 BENIGN ESSENTIAL HYPERTENSION: ICD-10-CM

## 2023-02-20 DIAGNOSIS — F41.9 ANXIETY: ICD-10-CM

## 2023-02-20 RX ORDER — SPIRONOLACTONE 50 MG/1
50 TABLET, FILM COATED ORAL DAILY
Qty: 90 TABLET | Refills: 1 | Status: SHIPPED | OUTPATIENT
Start: 2023-02-20

## 2023-02-20 RX ORDER — ALPRAZOLAM 0.5 MG/1
0.5 TABLET ORAL
Qty: 60 TABLET | Refills: 0 | Status: SHIPPED | OUTPATIENT
Start: 2023-02-20

## 2023-02-20 RX ORDER — SPIRONOLACTONE 50 MG/1
TABLET, FILM COATED ORAL
Qty: 90 TABLET | Refills: 1 | OUTPATIENT
Start: 2023-02-20

## 2023-02-20 NOTE — TELEPHONE ENCOUNTER
2/20/23-- LS 2/1/23, NA 5/1/23, LF 12/22/22 amt 60/0- one qhs and one extra if needed,  CHECKED THE PDMP

## 2023-02-23 ENCOUNTER — TELEMEDICINE (OUTPATIENT)
Dept: INTERNAL MEDICINE CLINIC | Facility: OTHER | Age: 53
End: 2023-02-23

## 2023-02-23 ENCOUNTER — OFFICE VISIT (OUTPATIENT)
Dept: SLEEP CENTER | Facility: CLINIC | Age: 53
End: 2023-02-23

## 2023-02-23 VITALS
DIASTOLIC BLOOD PRESSURE: 90 MMHG | WEIGHT: 186 LBS | OXYGEN SATURATION: 97 % | HEART RATE: 88 BPM | HEIGHT: 62 IN | SYSTOLIC BLOOD PRESSURE: 138 MMHG | BODY MASS INDEX: 34.23 KG/M2

## 2023-02-23 VITALS — BODY MASS INDEX: 34.23 KG/M2 | HEIGHT: 62 IN | WEIGHT: 186 LBS

## 2023-02-23 DIAGNOSIS — U07.1 COVID-19 VIRUS INFECTION: Primary | ICD-10-CM

## 2023-02-23 DIAGNOSIS — G47.33 OSA (OBSTRUCTIVE SLEEP APNEA): ICD-10-CM

## 2023-02-23 DIAGNOSIS — E55.9 VITAMIN D DEFICIENCY: ICD-10-CM

## 2023-02-23 RX ORDER — NIRMATRELVIR AND RITONAVIR 300-100 MG
3 KIT ORAL 2 TIMES DAILY
Qty: 30 TABLET | Refills: 0 | Status: SHIPPED | OUTPATIENT
Start: 2023-02-23 | End: 2023-02-28

## 2023-02-23 NOTE — PROGRESS NOTES
Consultation - [address] : 1970  MRN: [id number]      Assessment:  The patient has obstructive sleep apnea  She underwent home sleep apnea testing which demonstrated a respiratory event index of 17 1  The test was ordered by her dentist, who subsequently made the patient a mandibular advancement device  The patient continues to snore  Plan: To confirm the effectiveness of her mandibular advancement device, I will order home sleep apnea testing with the appliance  If significant LATOSHA persists, I will consider starting positive airway pressure therapy  If there is little LATOSHA, I will have her continue to use her device  Follow up: After testing    History of Present Illness:   46 y  o female with a long history of loud snoring  The patient denies any history of witnessed apneas, choking, gasping or reflux  She denies awakening with shortness of breath  She has occasional morning headache and frequently has dry mouth  Her weight has been stable  She denies any parasomnias          Historical Information    Past Medical History:      Family History: non-contributory    Social History     Socioeconomic History   • Marital status: /Civil Union     Spouse name: Not on file   • Number of children: Not on file   • Years of education: Not on file   • Highest education level: Not on file   Occupational History   • Occupation: HOMEMAKER     Comment: [employer]   Tobacco Use   • Smoking status: Former     Packs/day: 0 25     Years: 4 00     Pack years: 1 00     Types: Cigarettes     Start date: 5     Quit date: 1989     Years since quittin 1   • Smokeless tobacco: Never   • Tobacco comments:     smoked for 1 year in college   Vaping Use   • Vaping Use: Never used   Substance and Sexual Activity   • Alcohol use: Yes     Comment: 2 glasses of wine per month   • Drug use: No   • Sexual activity: Yes     Birth control/protection: Other     Comment: hysterectomy Other Topics Concern   • Not on file   Social History Narrative    Currently      WEIGHTLIFTING AND CARDIO, 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    RECREATIONAL ACTIVITIES: WEIGHTLIFTING     Social Determinants of Health     Financial Resource Strain: Not on file   Food Insecurity: Not on file   Transportation Needs: Not on file   Physical Activity: Not on file   Stress: Not on file   Social Connections: Not on file   Intimate Partner Violence: Not on file   Housing Stability: Not on file         Sleep Schedule: unremarkable    Snoring: Yes    Witnessed Apnea: No    Medications/Allergies:    Current Outpatient Medications:   •  acyclovir (ZOVIRAX) 5 % ointment, Apply topically 4 (four) times a day PRN, Disp: 15 g, Rfl: 5  •  ALPRAZolam (XANAX) 0 5 mg tablet, Take 1 tablet (0 5 mg total) by mouth daily at bedtime as needed for anxiety May take 1 extra tab daily if needed, Disp: 60 tablet, Rfl: 0  •  amLODIPine (NORVASC) 5 mg tablet, Take 1 tablet (5 mg total) by mouth daily, Disp: 90 tablet, Rfl: 1  •  ergocalciferol (VITAMIN D2) 50,000 units, Take 1 capsule (50,000 Units total) by mouth once a week, Disp: 12 capsule, Rfl: 1  •  ibuprofen (MOTRIN) 200 mg tablet, Take 200-800 mg by mouth every 6 (six) hours as needed  , Disp: , Rfl:   •  metoprolol succinate (TOPROL-XL) 50 mg 24 hr tablet, Take 1 tablet (50 mg total) by mouth daily, Disp: 90 tablet, Rfl: 1  •  spironolactone (ALDACTONE) 50 mg tablet, Take 1 tablet (50 mg total) by mouth daily, Disp: 90 tablet, Rfl: 1  •  brompheniramine-pseudoephedrine-DM 30-2-10 MG/5ML syrup, Take 5 mL by mouth 4 (four) times a day as needed for cough (Patient not taking: Reported on 2/1/2023), Disp: 180 mL, Rfl: 0        No notes on file                  Objective:    Vital Signs:   Vitals:    02/23/23 1030   BP: 138/90   BP Location: Left arm   Patient Position: Sitting   Cuff Size: Standard   Pulse: 88   SpO2: 97%   Weight: 84 4 kg (186 lb)   Height: 5' 2" (1 575 m)     Neck Circumference: 15      [place] Sleepiness Scale: Total score: 1    Physical Exam:    General: Alert, appropriate, cooperative, overweight    Head: NC/AT, moderate retrognathia    Nose: No septal deviation, nares not obstructed, mucosa normal    Throat: Airway diminished, Mallampati IV, tongue base thickened, 2+ tonsils visualized    Neck: Normal, no thyromegaly or lymphadenopathy, no JVD    Heart: RR, normal S1 and S2, no murmurs    Chest: Clear bilaterally    Extremity: No clubbing, cyanosis, no edema    Skin: Warm, dry    Neuro: No motor abnormalities, cranial nerves appear intact    Sleep Study Results:   DIMA = 17    Counseling / Coordination of Care  A description of the counseling / coordination of care: We discussed the pathophysiology of obstructive sleep apnea as well as the possible treatment options  We also discussed the rationale for positive airway pressure therapy  Board Certified Sleep Specialist    Portions of the record may have been created with voice recognition software  Occasional wrong word or "sound a like" substitutions may have occurred due to the inherent limitations of voice recognition software  Read the chart carefully and recognize, using context, where substitutions have occurred

## 2023-02-23 NOTE — PROGRESS NOTES
COVID-19 Outpatient Progress Note    Assessment/Plan:    Problem List Items Addressed This Visit        Respiratory    LATOSHA (obstructive sleep apnea)       Other    Vitamin D deficiency   Other Visit Diagnoses     COVID-19 virus infection    -  Primary    Relevant Medications    nirmatrelvir & ritonavir (Paxlovid, 300/100,) tablet therapy pack         COVID 19 virus infection  Symptom onset 2/202/23  Patient had not yet tested for COVID prior to this visit  She took one during the visit and it was positive  She has been vaccinated but not boosted for COVID  · Will treat with Paxlovid  · Flonase for congestion  · Supplement vitamin C and D and encouraged adequate hydration  · Isolate 5 days from symptom onset and consider as long as 7-10 days and symptom free for 3 days; mask in public until 10 days from symptom onset  · COVID booster in 3 months    Vitamin D deficiency  · Continue vitamin D supplement    LATOSHA  Was supposed to have sleep study done tonight  · Defer until infection resolves    Disposition:     Discussed symptom directed medication options with patient  Discussed vitamin D, vitamin C, and/or zinc supplementation with patient  Patient meets criteria for PAXLOVID and they have been counseled appropriately according to EUA documentation released by the FDA  After discussion, patient agrees to treatment  Crystal Hole is an investigational medicine used to treat mild-to-moderate COVID-19 in adults and children (15years of age and older weighing at least 80 pounds (40 kg)) with positive results of direct SARS-CoV-2 viral testing, and who are at high risk for progression to severe COVID-19, including hospitalization or death  PAXLOVID is investigational because it is still being studied  There is limited information about the safety and effectiveness of using PAXLOVID to treat people with mild-to-moderate COVID-19      The FDA has authorized the emergency use of PAXLOVID for the treatment of mild-tomoderate COVID-19 in adults and children (15years of age and older weighing at least 80 pounds (40 kg)) with a positive test for the virus that causes COVID-19, and who are at high risk for progression to severe COVID-19, including hospitalization or death, under an EUA  What should I tell my healthcare provider before I take PAXLOVID? Tell your healthcare provider if you:  - Have any allergies  - Have liver or kidney disease  - Are pregnant or plan to become pregnant  - Are breastfeeding a child  - Have any serious illnesses    Tell your healthcare provider about all the medicines you take, including prescription and over-the-counter medicines, vitamins, and herbal supplements  Some medicines may interact with PAXLOVID and may cause serious side effects  Keep a list of your medicines to show your healthcare provider and pharmacist when you get a new medicine  You can ask your healthcare provider or pharmacist for a list of medicines that interact with PAXLOVID  Do not start taking a new medicine without telling your healthcare provider  Your healthcare provider can tell you if it is safe to take PAXLOVID with other medicines  Tell your healthcare provider if you are taking combined hormonal contraceptive  PAXLOVID may affect how your birth control pills work  Females who are able to become pregnant should use another effective alternative form of contraception or an additional barrier method of contraception  Talk to your healthcare provider if you have any questions about contraceptive methods that might be right for you  How do I take PAXLOVID? PAXLOVID consists of 2 medicines: nirmatrelvir and ritonavir  - Take 2 pink tablets of nirmatrelvir with 1 white tablet of ritonavir by mouth 2 times each day (in the morning and in the evening) for 5 days  For each dose, take all 3 tablets at the same time  - If you have kidney disease, talk to your healthcare provider   You may need a different dose  - Swallow the tablets whole  Do not chew, break, or crush the tablets  - Take PAXLOVID with or without food  - Do not stop taking PAXLOVID without talking to your healthcare provider, even if you feel better  - If you miss a dose of PAXLOVID within 8 hours of the time it is usually taken, take it as soon as you remember  If you miss a dose by more than 8 hours, skip the missed dose and take the next dose at your regular time  Do not take 2 doses of PAXLOVID at the same time  - If you take too much PAXLOVID, call your healthcare provider or go to the nearest [facility] emergency room right away  - If you are taking a ritonavir- or cobicistat-containing medicine to treat hepatitis C or Human Immunodeficiency Virus (HIV), you should continue to take your medicine as prescribed by your healthcare provider   - Talk to your healthcare provider if you do not feel better or if you feel worse after 5 days  Who should generally not take PAXLOVID? Do not take PAXLOVID if:  You are allergic to nirmatrelvir, ritonavir, or any of the ingredients in PAXLOVID  You are taking any of the following medicines:  - Alfuzosin  - Pethidine, piroxicam, propoxyphene  - Ranolazine  - Amiodarone, dronedarone, flecainide, propafenone, quinidine  - Colchicine  - Lurasidone, pimozide, clozapine  - Dihydroergotamine, ergotamine, methylergonovine  - Lovastatin, simvastatin  - Sildenafil (Revatio®) for pulmonary arterial hypertension (PAH)  - Triazolam, oral midazolam  - Apalutamide  - Carbamazepine, phenobarbital, phenytoin  - Rifampin  - St  Dennis’s Wort (hypericum perforatum)    What are the important possible side effects of PAXLOVID? Possible side effects of PAXLOVID are:  - Liver Problems   Tell your healthcare provider right away if you have any of these signs and symptoms of liver problems: loss of appetite, yellowing of your skin and the whites of eyes (jaundice), dark-colored urine, pale colored stools and itchy skin, stomach area (abdominal) pain  - Resistance to HIV Medicines  If you have untreated HIV infection, PAXLOVID may lead to some HIV medicines not working as well in the future  - Other possible side effects include: altered sense of taste, diarrhea, high blood pressure, or muscle aches    These are not all the possible side effects of PAXLOVID  Not many people have taken PAXLOVID  Serious and unexpected side effects may happen  Ross Paulino is still being studied, so it is possible that all of the risks are not known at this time  What other treatment choices are there? Like Robina Cohen may allow for the emergency use of other medicines to treat people with COVID-19  Go to https://CS-Keys/ for information on the emergency use of other medicines that are authorized by FDA to treat people with COVID-19  Your healthcare provider may talk with you about clinical trials for which you may be eligible  It is your choice to be treated or not to be treated with PAXLOVID  Should you decide not to receive it or for your child not to receive it, it will not change your standard medical care  What if I am pregnant or breastfeeding? There is no experience treating pregnant women or breastfeeding mothers with PAXLOVID  For a mother and unborn baby, the benefit of taking PAXLOVID may be greater than the risk from the treatment  If you are pregnant, discuss your options and specific situation with your healthcare provider  It is recommended that you use effective barrier contraception or do not have sexual activity while taking PAXLOVID  If you are breastfeeding, discuss your options and specific situation with your healthcare provider  How do I report side effects with PAXLOVID? Contact your healthcare provider if you have any side effects that bother you or do not go away      Report side effects to FDA MedWatch at www fda gov/medwatch or call 0-437-MBH9040 or you can report side effects to [facility]  at the contact information provided below  Website Fax number Telephone number   [employer] [phone]     How should I store [address]? Store PAXLOVID tablets at room temperature between 68°F to 77°F (20°C to 25°C)  Full fact sheet for patients, parents, and caregivers can be found at: [employer]    I have spent 15 minutes directly with the patient  Encounter provider: Shannen Raya MD     Provider located at: [address] [place] [address]     Recent Visits  No visits were found meeting these conditions  Showing recent visits within past 7 days and meeting all other requirements  Today's Visits  Date Type Provider Dept   02/23/23 Earle Moya MD [facility]   Showing today's visits and meeting all other requirements  Future Appointments  No visits were found meeting these conditions  Showing future appointments within next 150 days and meeting all other requirements     This virtual check-in was done via [address] and patient was informed that this is a secure, HIPAA-compliant platform  She agrees to proceed  Patient agrees to participate in a virtual check in via telephone or video visit instead of presenting to the office to address urgent/immediate medical needs  Patient is aware this is a billable service  She acknowledged consent and understanding of privacy and security of the video platform  The patient has agreed to participate and understands they can discontinue the visit at any time  After connecting through [facility], the patient was identified by name and date of birth   Rodríguez Covarrubiasbill was informed that this was a telemedicine visit and that the exam was being conducted confidentially over secure lines  My office door was closed  No one else was in the room  Jacqueline Brandon acknowledged consent and understanding of privacy and security of the telemedicine visit  I informed the patient that I have reviewed her record in Epic and presented the opportunity for her to ask any questions regarding the visit today  The patient agreed to participate  Verification of patient location:  Patient is located in the following state in which I hold an active license: [place]    Subjective:   Jacqueline Brandon is a 46 y o  female who is concerned about COVID-19  Patient's symptoms include nasal congestion, rhinorrhea and cough  Patient denies fever, chills, fatigue, malaise, sore throat, shortness of breath, chest tightness, abdominal pain, nausea, diarrhea, myalgias and headaches  - Date of symptom onset: 2/20/2023      COVID-19 vaccination status: Fully vaccinated (primary series)    Patient seen in office 2/1/2023 with URI and given Augmentin  She completed the course and felt better a few days into the course  2 days ago she began having sinus congestion again with clear mucous  She has also had a dry cough which she attributes to post nasal drip  She denies any fever or SOB  She has history of recurrent sinus infections and she would like antibiotic as she is afraid of it becoming much worse  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 [address] Not Detected 04/26/2022       Review of Systems   Constitutional: Negative for chills, fatigue and fever  HENT: Positive for congestion, postnasal drip, rhinorrhea, sinus pressure and sinus pain   Negative for sore throat  Respiratory: Positive for cough  Negative for chest tightness and shortness of breath  Cardiovascular: Negative for chest pain  Gastrointestinal: Negative for abdominal pain, diarrhea and nausea  Musculoskeletal: Negative for arthralgias and myalgias  Neurological: Negative for dizziness, light-headedness and headaches  Current Outpatient Medications on File Prior to Visit   Medication Sig   • acyclovir (ZOVIRAX) 5 % ointment Apply topically 4 (four) times a day PRN   • ALPRAZolam (XANAX) 0 5 mg tablet Take 1 tablet (0 5 mg total) by mouth daily at bedtime as needed for anxiety May take 1 extra tab daily if needed   • amLODIPine (NORVASC) 5 mg tablet Take 1 tablet (5 mg total) by mouth daily   • ergocalciferol (VITAMIN D2) 50,000 units Take 1 capsule (50,000 Units total) by mouth once a week   • ibuprofen (MOTRIN) 200 mg tablet Take 200-800 mg by mouth every 6 (six) hours as needed     • metoprolol succinate (TOPROL-XL) 50 mg 24 hr tablet Take 1 tablet (50 mg total) by mouth daily   • spironolactone (ALDACTONE) 50 mg tablet Take 1 tablet (50 mg total) by mouth daily   • brompheniramine-pseudoephedrine-DM 30-2-10 MG/5ML syrup Take 5 mL by mouth 4 (four) times a day as needed for cough (Patient not taking: Reported on 2/1/2023)       Objective:    Ht 5' 2" (1 575 m)   Wt 84 4 kg (186 lb)   LMP 01/21/2019 Comment: AUB over past year  BMI 34 02 kg/m²      Physical Exam  Constitutional:       General: She is not in acute distress  HENT:      Nose: Congestion present  Eyes:      Extraocular Movements: Extraocular movements intact  Conjunctiva/sclera: Conjunctivae normal    Pulmonary:      Effort: No respiratory distress  Musculoskeletal:      Cervical back: Neck supple  Neurological:      Mental Status: She is alert  Psychiatric:         Mood and Affect: Mood normal          Speech: Speech normal          Behavior: Behavior normal  Behavior is cooperative            Cesar Zhu Oneil Rubin MD

## 2023-02-28 ENCOUNTER — ANNUAL EXAM (OUTPATIENT)
Dept: GYNECOLOGY | Facility: CLINIC | Age: 53
End: 2023-02-28

## 2023-02-28 VITALS — SYSTOLIC BLOOD PRESSURE: 120 MMHG | DIASTOLIC BLOOD PRESSURE: 84 MMHG | HEART RATE: 91 BPM

## 2023-02-28 DIAGNOSIS — Z01.419 ENCOUNTER FOR GYNECOLOGICAL EXAMINATION WITHOUT ABNORMAL FINDING: Primary | ICD-10-CM

## 2023-02-28 DIAGNOSIS — N39.3 SUI (STRESS URINARY INCONTINENCE, FEMALE): ICD-10-CM

## 2023-02-28 DIAGNOSIS — Z13.820 SCREENING FOR OSTEOPOROSIS: ICD-10-CM

## 2023-02-28 NOTE — PROGRESS NOTES
Assessment/Plan:         Diagnoses and all orders for this visit:    Encounter for gynecological examination without abnormal finding    Screening for osteoporosis; peripheral scan: +0 1    LISETTE (stress urinary incontinence, female); discussed surgical correction  Patient opts to continue to follow        Subjective:      Patient ID: Hawa London is a 46 y o  female  HPI patient presents for annual examination  She is status post [facility] BS in 2020  She denies any vaginal irritation, burning, discharge or bleeding  Denies any dysuria or hematuria  She is experiencing stress incontinence  This seems to be getting worse  We have discussed surgical correction with her  At this point she opts to continue to follow-up  No GI complaints  Colonoscopy 2020  Polyps identified  Advised to repeat in 3 years    The following portions of the patient's history were reviewed and updated as appropriate:   She  has a past medical history of Abnormal uterine bleeding (AUB), Acute COVID-19 (2022), Anemia, Anxiety, COVID (2022), Endometrial polyp, High triglycerides, Hypertension, Seasonal allergies, Sleep apnea, Umbilical hernia, Vitamin D deficiency, and Wears contact lenses  She   Patient Active Problem List    Diagnosis Date Noted   • LATOSHA (obstructive sleep apnea) 2023   • BMI 32 0-32 9,adult 2022   • Family history of early CAD 2022   • Hammer toes of both feet 10/05/2020   • Status post laparoscopic hysterectomy 2020   • White coat syndrome with diagnosis of hypertension 2019   • Proptosis 2019   • URTI (acute upper respiratory infection) 2019   • Vitamin D deficiency 2018   • Essential hypertriglyceridemia 2015   • Benign essential hypertension 02/10/2014   • Generalized anxiety disorder 10/09/2013     She  has a past surgical history that includes  section; Breast surgery; Tubal ligation (Bilateral);  Reduction mammaplasty; Hysteroscopy w/ polypectomy; pr hysteroscopy bx endometrium&/polypc w/wo d&c (N/A, 1/21/2019); pr hysteroscopy endometrial ablation (N/A, 1/21/2019); [place] tooth extraction; CYSTOSCOPY (N/A, 2/24/2020); and Hysterectomy (N/A, 2/24/2020)  Her family history includes Hyperlipidemia in her father; Hypertension in her father; No Known Problems in her mother  She  reports that she quit smoking about 34 years ago  Her smoking use included cigarettes  She started smoking about 38 years ago  She has a 1 00 pack-year smoking history  She has never used smokeless tobacco  She reports current alcohol use  She reports that she does not use drugs  Current Outpatient Medications   Medication Sig Dispense Refill   • acyclovir (ZOVIRAX) 5 % ointment Apply topically 4 (four) times a day PRN 15 g 5   • ALPRAZolam (XANAX) 0 5 mg tablet Take 1 tablet (0 5 mg total) by mouth daily at bedtime as needed for anxiety May take 1 extra tab daily if needed 60 tablet 0   • amLODIPine (NORVASC) 5 mg tablet Take 1 tablet (5 mg total) by mouth daily 90 tablet 1   • ergocalciferol (VITAMIN D2) 50,000 units Take 1 capsule (50,000 Units total) by mouth once a week 12 capsule 1   • ibuprofen (MOTRIN) 200 mg tablet Take 200-800 mg by mouth every 6 (six) hours as needed       • metoprolol succinate (TOPROL-XL) 50 mg 24 hr tablet Take 1 tablet (50 mg total) by mouth daily 90 tablet 1   • nirmatrelvir & ritonavir (Paxlovid, 300/100,) tablet therapy pack Take 3 tablets by mouth 2 (two) times a day for 5 days Take 2 nirmatrelvir tablets + 1 ritonavir tablet together per dose 30 tablet 0   • spironolactone (ALDACTONE) 50 mg tablet Take 1 tablet (50 mg total) by mouth daily 90 tablet 1   • brompheniramine-pseudoephedrine-DM 30-2-10 MG/5ML syrup Take 5 mL by mouth 4 (four) times a day as needed for cough 180 mL 0     No current facility-administered medications for this visit       Current Outpatient Medications on File Prior to Visit Medication Sig   • acyclovir (ZOVIRAX) 5 % ointment Apply topically 4 (four) times a day PRN   • ALPRAZolam (XANAX) 0 5 mg tablet Take 1 tablet (0 5 mg total) by mouth daily at bedtime as needed for anxiety May take 1 extra tab daily if needed   • amLODIPine (NORVASC) 5 mg tablet Take 1 tablet (5 mg total) by mouth daily   • ergocalciferol (VITAMIN D2) 50,000 units Take 1 capsule (50,000 Units total) by mouth once a week   • ibuprofen (MOTRIN) 200 mg tablet Take 200-800 mg by mouth every 6 (six) hours as needed     • metoprolol succinate (TOPROL-XL) 50 mg 24 hr tablet Take 1 tablet (50 mg total) by mouth daily   • nirmatrelvir & ritonavir (Paxlovid, 300/100,) tablet therapy pack Take 3 tablets by mouth 2 (two) times a day for 5 days Take 2 nirmatrelvir tablets + 1 ritonavir tablet together per dose   • spironolactone (ALDACTONE) 50 mg tablet Take 1 tablet (50 mg total) by mouth daily   • brompheniramine-pseudoephedrine-DM 30-2-10 MG/5ML syrup Take 5 mL by mouth 4 (four) times a day as needed for cough     No current facility-administered medications on file prior to visit  She is allergic to levofloxacin       Review of Systems   Constitutional: Negative  HENT: Negative for sore throat and trouble swallowing  Gastrointestinal: Negative  Genitourinary: Negative  Objective:      /84   Pulse 91   LMP 01/21/2019 Comment: AUB over past year         Physical Exam  Vitals reviewed  Cardiovascular:      Rate and Rhythm: Normal rate and regular rhythm  Pulses: Normal pulses  Heart sounds: Normal heart sounds  No murmur heard  Pulmonary:      Effort: Pulmonary effort is normal  No respiratory distress  Breath sounds: Normal breath sounds  Chest:   Breasts:     Right: No swelling, bleeding, inverted nipple, mass, nipple discharge, skin change or tenderness  Left: No swelling, bleeding, inverted nipple, mass, nipple discharge, skin change or tenderness     Abdominal: General: There is no distension  Palpations: Abdomen is soft  There is no mass  Tenderness: There is no abdominal tenderness  There is no rebound  Hernia: No hernia is present  There is no hernia in the left inguinal area or right inguinal area  Genitourinary:     General: Normal vulva  Labia:         Right: No rash, tenderness or lesion  Left: No rash, tenderness or lesion  Vagina: Normal       Uterus: Absent  Adnexa:         Right: No mass, tenderness or fullness  Left: No mass, tenderness or fullness  Musculoskeletal:      Cervical back: Normal range of motion and neck supple  No tenderness  Lymphadenopathy:      Cervical: No cervical adenopathy  Upper Body:      Right upper body: No supraclavicular, axillary or pectoral adenopathy  Left upper body: No supraclavicular, axillary or pectoral adenopathy  Lower Body: No right inguinal adenopathy  No left inguinal adenopathy  Neurological:      Mental Status: She is alert

## 2023-03-08 ENCOUNTER — TELEPHONE (OUTPATIENT)
Dept: INTERNAL MEDICINE CLINIC | Age: 53
End: 2023-03-08

## 2023-03-08 NOTE — TELEPHONE ENCOUNTER
Patient called she is traveling to [facility] 4/12/23 and is asking if she needs any vaccines and also a pill about the drinking water        Thank you

## 2023-03-09 NOTE — TELEPHONE ENCOUNTER
Spoke to pt  Understands  Pt  Would like Cipro for travelers diarrhea  Pt  Uses [facility] in [place]

## 2023-03-10 DIAGNOSIS — Z71.84 TRAVEL ADVICE ENCOUNTER: Primary | ICD-10-CM

## 2023-03-10 RX ORDER — AZITHROMYCIN 250 MG/1
250 TABLET, FILM COATED ORAL EVERY 24 HOURS
Qty: 10 TABLET | Refills: 0 | Status: SHIPPED | OUTPATIENT
Start: 2023-03-10 | End: 2023-03-20

## 2023-03-10 NOTE — TELEPHONE ENCOUNTER
Patient has has an allergy to Levaquin, will not prescribe Cipro  We will send in Zithromax to the pharmacy    Patient will be traveling in April

## 2023-03-15 NOTE — TELEPHONE ENCOUNTER
Armando Gonzalez, DO  You 3 months ago     FM  High cholesterol, hypertriglyceridemia, obesity and hypertension

## 2023-03-15 NOTE — TELEPHONE ENCOUNTER
Added dx: E78 00, E78 1, E66 9, & I10 to order in Epic  Sent to [place] [id number] for resubmission

## 2023-03-21 ENCOUNTER — TELEPHONE (OUTPATIENT)
Dept: SLEEP CENTER | Facility: CLINIC | Age: 53
End: 2023-03-21

## 2023-04-02 PROBLEM — J06.9 URTI (ACUTE UPPER RESPIRATORY INFECTION): Status: RESOLVED | Noted: 2019-06-24 | Resolved: 2023-04-02

## 2023-04-06 DIAGNOSIS — B00.1 RECURRENT COLD SORES: ICD-10-CM

## 2023-04-06 DIAGNOSIS — I10 HYPERTENSION, UNSPECIFIED TYPE: ICD-10-CM

## 2023-04-06 DIAGNOSIS — Z71.84 TRAVEL ADVICE ENCOUNTER: Primary | ICD-10-CM

## 2023-04-06 DIAGNOSIS — E55.9 VITAMIN D DEFICIENCY: ICD-10-CM

## 2023-04-06 DIAGNOSIS — I10 BENIGN ESSENTIAL HYPERTENSION: ICD-10-CM

## 2023-04-06 RX ORDER — AZITHROMYCIN 500 MG/1
TABLET, FILM COATED ORAL
Qty: 7 TABLET | Refills: 0 | Status: SHIPPED | OUTPATIENT
Start: 2023-04-06 | End: 2023-04-10 | Stop reason: SDUPTHER

## 2023-04-06 RX ORDER — ACYCLOVIR 50 MG/G
OINTMENT TOPICAL 4 TIMES DAILY
Qty: 15 G | Refills: 5 | Status: SHIPPED | OUTPATIENT
Start: 2023-04-06

## 2023-04-06 RX ORDER — METOPROLOL SUCCINATE 50 MG/1
50 TABLET, EXTENDED RELEASE ORAL DAILY
Qty: 90 TABLET | Refills: 1 | Status: SHIPPED | OUTPATIENT
Start: 2023-04-06

## 2023-04-06 RX ORDER — ERGOCALCIFEROL 1.25 MG/1
50000 CAPSULE ORAL WEEKLY
Qty: 12 CAPSULE | Refills: 1 | Status: SHIPPED | OUTPATIENT
Start: 2023-04-06

## 2023-04-06 RX ORDER — AMLODIPINE BESYLATE 5 MG/1
5 TABLET ORAL DAILY
Qty: 90 TABLET | Refills: 1 | Status: SHIPPED | OUTPATIENT
Start: 2023-04-06

## 2023-05-18 ENCOUNTER — APPOINTMENT (OUTPATIENT)
Dept: LAB | Facility: HOSPITAL | Age: 53
End: 2023-05-18

## 2023-05-18 DIAGNOSIS — E78.1 ESSENTIAL HYPERTRIGLYCERIDEMIA: ICD-10-CM

## 2023-05-18 LAB
ALBUMIN SERPL BCP-MCNC: 4.7 G/DL (ref 3.5–5)
ALP SERPL-CCNC: 48 U/L (ref 34–104)
ALT SERPL W P-5'-P-CCNC: 38 U/L (ref 7–52)
ANION GAP SERPL CALCULATED.3IONS-SCNC: 6 MMOL/L (ref 4–13)
AST SERPL W P-5'-P-CCNC: 27 U/L (ref 13–39)
BILIRUB SERPL-MCNC: 0.8 MG/DL (ref 0.2–1)
BUN SERPL-MCNC: 15 MG/DL (ref 5–25)
CALCIUM SERPL-MCNC: 9.7 MG/DL (ref 8.4–10.2)
CHLORIDE SERPL-SCNC: 105 MMOL/L (ref 96–108)
CHOLEST SERPL-MCNC: 153 MG/DL
CO2 SERPL-SCNC: 27 MMOL/L (ref 21–32)
CREAT SERPL-MCNC: 0.77 MG/DL (ref 0.6–1.3)
GFR SERPL CREATININE-BSD FRML MDRD: 88 ML/MIN/1.73SQ M
GLUCOSE P FAST SERPL-MCNC: 109 MG/DL (ref 65–99)
HDLC SERPL-MCNC: 41 MG/DL
LDLC SERPL CALC-MCNC: 57 MG/DL (ref 0–100)
POTASSIUM SERPL-SCNC: 4.1 MMOL/L (ref 3.5–5.3)
PROT SERPL-MCNC: 7.3 G/DL (ref 6.4–8.4)
SODIUM SERPL-SCNC: 138 MMOL/L (ref 135–147)
TRIGL SERPL-MCNC: 273 MG/DL

## 2023-05-23 ENCOUNTER — OFFICE VISIT (OUTPATIENT)
Dept: INTERNAL MEDICINE CLINIC | Facility: CLINIC | Age: 53
End: 2023-05-23

## 2023-05-23 VITALS
SYSTOLIC BLOOD PRESSURE: 118 MMHG | BODY MASS INDEX: 34.09 KG/M2 | DIASTOLIC BLOOD PRESSURE: 82 MMHG | HEART RATE: 93 BPM | WEIGHT: 186.4 LBS | TEMPERATURE: 98.2 F | OXYGEN SATURATION: 98 %

## 2023-05-23 DIAGNOSIS — I10 BENIGN ESSENTIAL HYPERTENSION: ICD-10-CM

## 2023-05-23 DIAGNOSIS — E55.9 VITAMIN D DEFICIENCY: ICD-10-CM

## 2023-05-23 DIAGNOSIS — F41.1 GENERALIZED ANXIETY DISORDER: ICD-10-CM

## 2023-05-23 DIAGNOSIS — Z12.31 ENCOUNTER FOR SCREENING MAMMOGRAM FOR MALIGNANT NEOPLASM OF BREAST: Primary | ICD-10-CM

## 2023-05-23 DIAGNOSIS — E78.1 ESSENTIAL HYPERTRIGLYCERIDEMIA: ICD-10-CM

## 2023-05-23 NOTE — PROGRESS NOTES
Assessment/Plan:    1  Encounter for screening mammogram for malignant neoplasm of breast  -     Mammo screening bilateral w 3d & cad; Future; Expected date: 11/28/2023    2  Vitamin D deficiency    3  Benign essential hypertension    4  Essential hypertriglyceridemia  -     Lipid Panel with Direct LDL reflex; Future  -     Comprehensive metabolic panel; Future    5  Generalized anxiety disorder     recommended intermittent fasting, increasing protein and fiber intake, recomm daily metamucil intake        There are no Patient Instructions on file for this visit  Return in about 4 months (around 9/23/2023) for Recheck  Subjective:      Patient ID: Madison Mendoza is a 48 y o  female  Chief Complaint   Patient presents with   • Follow-up     6 m f/u visit, review labs from 5/18/23  Mammogram is scheduled 11/30/23  HPI    Hypertension (Follow-Up): The patient presents for follow-up of essential hypertension  The patient states she has been doing well with her blood pressure control since the last visit  She has no comorbid illnesses  Interval Events:  Compliant with her medications   Lisinopril was stopped at last visit due to cough however replacement medication has not been started yet since she joined a gym   Since that time she has had difficulty keeping up with her athletic appointments because she found it too difficult and too expensive   She will be provoking her gym membership dec 2019 took 2 xanax before coming here, daughter drove her  Does not check BP at home, oct 2020 has been doing the ketogenic diet and feels much better   October 2021, doing well with keto diet home readings are good   Has not been losing weight recently however has been cheating with some carbs on a regular basis and feels the keto diet has stalled  May 2022, feeling sick today and under treatment   At Urgent Care last systolic was 416 last week  November 2022, feeling well, no issues    Has stopped checking blood pressure at home  May 2023: well controlled, no issues, compliant with meds    Symptoms: denies impaired vision,-- denies dyspnea,-- denies chest pain,-- denies intermittent leg claudication-- and-- denies lower extremity edema  Associated symptoms include no headache,-- no focal neurologic deficits-- and-- no memory loss  Home monitoring: The patient checks her blood pressure sporadically  Blood pressure control has been better than our office  April 2021, improved   Circular diet October 2021, home readings are good  Lifestyle: Diet: She consumes a diverse and healthy diet  Weight Issues: She has weight concerns  Weight control issues: overweight  Smoking: The patient has never smoked cigarettes  Medications: the patient is adherent with her medication regimen  -- She denies medication side effects  Disease Management: the patient is not doing well with her blood pressure goals  The patient is due for an eye exam                   Anxiety Disorder (Follow-Up): The patient is being seen for follow-up of anxiety  The patient reports doing poorly  She has no comorbid illnesses  Uses xanax at night to help sleep  does not take in the daytime  11/8/16: got a new job, friend recently passed  daughter left for college  father is not speaking to her  12/20/16 never started lexapro since she had in the past and had sexual side effects   2/2019: failed lexapro- did not work, wellbutrin, and buspar- ADR  Dec 2019 takes xanax every night to sleep  Feels well January 2020, relatively stable October 2020, never started Prozac April 2021, improved   Started keto diet and has more energy has decreased weight   Never started Prozac   May 2022, overall doing well   Uses Xanax very infrequently however 2 weeks ago had a large argument with her father and did need a few days of twice a day Xanax   No HI or SI  Nov 2022:  No new changes, still very anxious at times which affects her blood pressure    She is not ready for daily medication and in the past has been on Wellbutrin and another 1 at she cannot remember and due to sexual side effects she stopped as well as weight gain  She is very concerned about side effects  No HI or SI  Interval symptoms:  worsened anxiety,-- worsened difficulty concentrating,-- worsened restlessness,-- worsened panic attacks,-- worsened sleep disruption-- and-- worsened depression      No HI or SI in no side effects  Associated symptoms: no grandiosity,-- no racing thoughts,-- no periods of euphoria,-- no hallucinations-- and-- no suicidal ideation  Medications:  the patient is adherent to her medication regimen, but-- she denies medication side effects  Disease management:  the patient is not doing well with her goals  Additional history: was tapering off xanax but now taking regularly at night     May 2023: well controlled recently, no HI or SI           Hyperlipidemia (Follow-Up): The patient states her hyperlipidemia has been under good control since the last visit  Comorbid Illnesses: hypertension  Interval Events:   on fenofibrate TG were 229 and now 206,improved  Symptoms: TG now 173 aug 2019- improved dec 2019, was on KETo and now is eating more  Carbs   Lost 2 lbs, TG 10 points better than last visit January 2020, stop ketogenic diet when she had her hysterectomy   Has seen a 100 point increase in her triglyceride level October 2020, triglyceride level significantly improved to 130 with ketogenic diet and increasing activity levels   She has been jogging with her friend April 2021, significantly improved triglycerides with keto diet and has lost some weight   Feeling well and still taking fenofibrate   She is compliant October 2021, significant improvements in blood sugar with increase in triglycerides   Doing keto diet however eats a small amount of ice cream every single night before bed which is not on her list of approved foods   Has significant family history of CAD on her father side Nov 2022:  Stable, no issues however no improvement in triglycerides  Has not been working out  Went to the nutritionist and was told calorie count but patient states this is impossible for her to do  May 2023: increased TG  Recently on vacation  Medications: the patient is adherent with her medication regimen   The patient is not doing well with her hyperlipidemia goals              The following portions of the patient's history were reviewed and updated as appropriate: allergies, current medications, past family history, past medical history, past social history, past surgical history and problem list     Review of Systems      Constitutional:  Denies fever or chills   Eyes:  Denies double , blurry vision or eye pain  HENT:  Denies nasal congestion, sore throat or new hearing issues  Respiratory:  Denies cough or shortness of breath or wheezing  Cardiovascular:  Denies palpitations or chest pain  GI:  Denies abdominal pain, nausea, or vomiting, no loose stools, no reflux  Integument:  Denies rash , no open areas  Neurologic:  Denies headache or focal weakness, no dizziness  : no dysuria, or hematuria      Current Outpatient Medications   Medication Sig Dispense Refill   • acyclovir (ZOVIRAX) 5 % ointment Apply topically 4 (four) times a day PRN 15 g 5   • ALPRAZolam (XANAX) 0 5 mg tablet Take 1 tablet (0 5 mg total) by mouth daily at bedtime as needed for anxiety May take 1 extra tab daily if needed 60 tablet 0   • amLODIPine (NORVASC) 5 mg tablet Take 1 tablet (5 mg total) by mouth daily 90 tablet 1   • ergocalciferol (VITAMIN D2) 50,000 units Take 1 capsule (50,000 Units total) by mouth once a week 12 capsule 1   • ibuprofen (MOTRIN) 200 mg tablet Take 200-800 mg by mouth every 6 (six) hours as needed       • metoprolol succinate (TOPROL-XL) 50 mg 24 hr tablet Take 1 tablet (50 mg total) by mouth daily 90 tablet 1   • spironolactone (ALDACTONE) 50 mg tablet Take 1 tablet (50 mg total) by mouth daily 90 tablet 1     No current facility-administered medications for this visit         Objective:    /82 (BP Location: Left arm, Patient Position: Sitting, Cuff Size: Large)   Pulse 93   Temp 98 2 °F (36 8 °C) (Tympanic)   Wt 84 6 kg (186 lb 6 4 oz) Comment: refused  LMP 01/21/2019 Comment: AUB over past year  SpO2 98%   BMI 34 09 kg/m²        Physical Exam      Constitutional:  Well developed, well nourished, no acute distress, non-toxic appearance   Eyes:  PERRL, conjunctiva normal , non icteric sclera  HENT:  Atraumatic, oropharynx moist  Neck-  supple   Respiratory:  CTA b/l, normal breath sounds, no rales, no wheezing   Cardiovascular:  RRR, no murmurs, no LE edema b/l  GI:  Soft, nondistended, normal bowel sounds x 4, nontender, no organomegaly, no mass, no rebound, no guarding   Neurologic:  no focal deficits noted   Psychiatric:  Speech and behavior appropriate , AAO x 3           Rosalinda Rahman, DO

## 2023-06-08 DIAGNOSIS — F41.9 ANXIETY: ICD-10-CM

## 2023-06-08 RX ORDER — ALPRAZOLAM 0.5 MG/1
0.5 TABLET ORAL
Qty: 60 TABLET | Refills: 0 | Status: SHIPPED | OUTPATIENT
Start: 2023-06-08

## 2023-06-12 ENCOUNTER — TELEPHONE (OUTPATIENT)
Dept: INTERNAL MEDICINE CLINIC | Age: 53
End: 2023-06-12

## 2023-06-12 NOTE — TELEPHONE ENCOUNTER
Patient wants to get off the xanax  She is asking how she should go about to get off of it  She said she didn't take it for 2 days and it wasn't good  Please advise

## 2023-06-13 ENCOUNTER — HOSPITAL ENCOUNTER (OUTPATIENT)
Dept: SLEEP CENTER | Facility: CLINIC | Age: 53
Discharge: HOME | End: 2023-06-13
Payer: COMMERCIAL

## 2023-06-13 DIAGNOSIS — G47.33 OSA (OBSTRUCTIVE SLEEP APNEA): ICD-10-CM

## 2023-06-13 PROCEDURE — G0399 HOME SLEEP TEST/TYPE 3 PORTA: HCPCS

## 2023-06-15 NOTE — PROGRESS NOTES
Home Sleep Study Documentation    HOME STUDY DEVICE: Noxturnal no                                           Macie G3 yes      Pre-Sleep Home Study:    Set-up and instructions performed by: Rich Garcia performed demonstration for Patient: yes    Return demonstration performed by Patient: yes    Written instructions provided to Patient: yes    Patient signed consent form: yes        Post-Sleep Home Study:    Additional comments by Patient:       Home Sleep Study Failed:no:    Failure reason: N/A    Reported or Detected: N/A    Scored by:  LEROY Cortes

## 2023-06-26 ENCOUNTER — TELEPHONE (OUTPATIENT)
Dept: SLEEP CENTER | Facility: CLINIC | Age: 53
End: 2023-06-26

## 2023-06-26 NOTE — TELEPHONE ENCOUNTER
Called patient and advised sleep study resulted and shows mild LATOSHA DIMA 11 6  Per study results, mandibular advancement device is effective as patient's baseline DIMA is 17 1    Patient will follow up with Dr Antonette Lainez at [facility]  Provided patient with phone number and address

## 2023-07-26 ENCOUNTER — TELEPHONE (OUTPATIENT)
Dept: INTERNAL MEDICINE CLINIC | Age: 53
End: 2023-07-26

## 2023-07-26 NOTE — TELEPHONE ENCOUNTER
Patient is calling regarding the medication Xanax that she is taking. She was weaning off like you stated, but she states that she thinks it is to fast of a wean.     This is what was stated on the last message     Start half table daily for one month and if doing well then half tablet M,W,F after that for 1 month , and then if doing well, she can stop it     Please advise if there is new change in the way she should be taking it    Please call her back at the mobile phone

## 2023-07-28 NOTE — TELEPHONE ENCOUNTER
Spoke with patient and she will try to do the half tablet for 2 months and then do the next step for 2 months and see how that goes

## 2023-09-13 DIAGNOSIS — F41.9 ANXIETY: ICD-10-CM

## 2023-09-13 NOTE — TELEPHONE ENCOUNTER
Patient called left voicemail on refill line asking to decrease  Xanax to 0.25 mg tablet.        Pended 0.25 mg tablet if ok to decrease

## 2023-09-14 RX ORDER — ALPRAZOLAM 0.25 MG/1
0.25 TABLET ORAL
Qty: 60 TABLET | Refills: 0 | Status: SHIPPED | OUTPATIENT
Start: 2023-09-14

## 2023-10-04 DIAGNOSIS — I10 BENIGN ESSENTIAL HYPERTENSION: ICD-10-CM

## 2023-10-04 RX ORDER — SPIRONOLACTONE 50 MG/1
50 TABLET, FILM COATED ORAL DAILY
Qty: 90 TABLET | Refills: 1 | Status: SHIPPED | OUTPATIENT
Start: 2023-10-04

## 2023-10-16 ENCOUNTER — RA CDI HCC (OUTPATIENT)
Dept: OTHER | Facility: HOSPITAL | Age: 53
End: 2023-10-16

## 2023-10-16 NOTE — PROGRESS NOTES
[facility] [address] [facility] coding opportunities       Chart reviewed, no opportunity found: CHART REVIEWED, NO OPPORTUNITY FOUND        Patients Insurance        Commercial Insurance: [employer]

## 2023-10-19 ENCOUNTER — APPOINTMENT (OUTPATIENT)
Dept: LAB | Facility: HOSPITAL | Age: 53
End: 2023-10-19
Payer: COMMERCIAL

## 2023-10-19 DIAGNOSIS — E78.1 ESSENTIAL HYPERTRIGLYCERIDEMIA: ICD-10-CM

## 2023-10-19 LAB
ALBUMIN SERPL BCP-MCNC: 4.8 G/DL (ref 3.5–5)
ALP SERPL-CCNC: 52 U/L (ref 34–104)
ALT SERPL W P-5'-P-CCNC: 31 U/L (ref 7–52)
ANION GAP SERPL CALCULATED.3IONS-SCNC: 6 MMOL/L
AST SERPL W P-5'-P-CCNC: 23 U/L (ref 13–39)
BILIRUB SERPL-MCNC: 1.05 MG/DL (ref 0.2–1)
BUN SERPL-MCNC: 12 MG/DL (ref 5–25)
CALCIUM SERPL-MCNC: 10.2 MG/DL (ref 8.4–10.2)
CHLORIDE SERPL-SCNC: 102 MMOL/L (ref 96–108)
CHOLEST SERPL-MCNC: 159 MG/DL
CO2 SERPL-SCNC: 29 MMOL/L (ref 21–32)
CREAT SERPL-MCNC: 0.79 MG/DL (ref 0.6–1.3)
GFR SERPL CREATININE-BSD FRML MDRD: 85 ML/MIN/1.73SQ M
GLUCOSE P FAST SERPL-MCNC: 106 MG/DL (ref 65–99)
HDLC SERPL-MCNC: 44 MG/DL
LDLC SERPL CALC-MCNC: 70 MG/DL (ref 0–100)
POTASSIUM SERPL-SCNC: 4 MMOL/L (ref 3.5–5.3)
PROT SERPL-MCNC: 7.4 G/DL (ref 6.4–8.4)
SODIUM SERPL-SCNC: 137 MMOL/L (ref 135–147)
TRIGL SERPL-MCNC: 225 MG/DL

## 2023-10-19 PROCEDURE — 80061 LIPID PANEL: CPT

## 2023-10-19 PROCEDURE — 80053 COMPREHEN METABOLIC PANEL: CPT

## 2023-10-19 PROCEDURE — 36415 COLL VENOUS BLD VENIPUNCTURE: CPT

## 2023-10-20 DIAGNOSIS — I10 HYPERTENSION, UNSPECIFIED TYPE: ICD-10-CM

## 2023-10-20 RX ORDER — METOPROLOL SUCCINATE 50 MG/1
50 TABLET, EXTENDED RELEASE ORAL DAILY
Qty: 90 TABLET | Refills: 1 | Status: SHIPPED | OUTPATIENT
Start: 2023-10-20 | End: 2023-10-20 | Stop reason: SDUPTHER

## 2023-10-20 RX ORDER — METOPROLOL SUCCINATE 50 MG/1
50 TABLET, EXTENDED RELEASE ORAL DAILY
Qty: 90 TABLET | Refills: 0 | OUTPATIENT
Start: 2023-10-20

## 2023-10-20 RX ORDER — METOPROLOL SUCCINATE 50 MG/1
50 TABLET, EXTENDED RELEASE ORAL DAILY
Qty: 90 TABLET | Refills: 1 | Status: SHIPPED | OUTPATIENT
Start: 2023-10-20

## 2023-10-23 ENCOUNTER — PATIENT MESSAGE (OUTPATIENT)
Dept: INTERNAL MEDICINE CLINIC | Facility: OTHER | Age: 53
End: 2023-10-23

## 2023-10-23 DIAGNOSIS — I10 BENIGN ESSENTIAL HYPERTENSION: ICD-10-CM

## 2023-10-24 RX ORDER — AMLODIPINE BESYLATE 5 MG/1
5 TABLET ORAL DAILY
Qty: 90 TABLET | Refills: 0 | OUTPATIENT
Start: 2023-10-24

## 2023-10-24 RX ORDER — AMLODIPINE BESYLATE 5 MG/1
5 TABLET ORAL DAILY
Qty: 90 TABLET | Refills: 1 | Status: SHIPPED | OUTPATIENT
Start: 2023-10-24

## 2023-11-06 DIAGNOSIS — F41.9 ANXIETY: ICD-10-CM

## 2023-11-06 RX ORDER — ALPRAZOLAM 0.25 MG/1
0.25 TABLET ORAL
Qty: 60 TABLET | Refills: 0 | Status: SHIPPED | OUTPATIENT
Start: 2023-11-06

## 2023-12-12 ENCOUNTER — OFFICE VISIT (OUTPATIENT)
Age: 53
End: 2023-12-12
Payer: COMMERCIAL

## 2023-12-12 VITALS
HEIGHT: 62 IN | BODY MASS INDEX: 34.04 KG/M2 | OXYGEN SATURATION: 99 % | TEMPERATURE: 98.7 F | HEART RATE: 75 BPM | WEIGHT: 185 LBS | SYSTOLIC BLOOD PRESSURE: 118 MMHG | DIASTOLIC BLOOD PRESSURE: 80 MMHG

## 2023-12-12 DIAGNOSIS — F41.1 GENERALIZED ANXIETY DISORDER: ICD-10-CM

## 2023-12-12 DIAGNOSIS — I10 BENIGN ESSENTIAL HYPERTENSION: Primary | ICD-10-CM

## 2023-12-12 DIAGNOSIS — E55.9 VITAMIN D DEFICIENCY: ICD-10-CM

## 2023-12-12 DIAGNOSIS — G47.33 OSA (OBSTRUCTIVE SLEEP APNEA): ICD-10-CM

## 2023-12-12 DIAGNOSIS — Z82.49 FAMILY HISTORY OF EARLY CAD: ICD-10-CM

## 2023-12-12 DIAGNOSIS — E78.1 ESSENTIAL HYPERTRIGLYCERIDEMIA: ICD-10-CM

## 2023-12-12 DIAGNOSIS — I10 WHITE COAT SYNDROME WITH DIAGNOSIS OF HYPERTENSION: ICD-10-CM

## 2023-12-12 PROCEDURE — 99214 OFFICE O/P EST MOD 30 MIN: CPT | Performed by: FAMILY MEDICINE

## 2023-12-12 NOTE — PROGRESS NOTES
Assessment/Plan:    1. Benign essential hypertension    2. Essential hypertriglyceridemia  -     Lipid Panel with Direct LDL reflex; Future  -     Comprehensive metabolic panel; Future  -     CBC and differential; Future    3. Vitamin D deficiency    4. White coat syndrome with diagnosis of hypertension    5. LATOSHA (obstructive sleep apnea)    6. Generalized anxiety disorder    7. Family history of early CAD      BMI Counseling: Body mass index is 33.84 kg/m². The BMI is above normal. Nutrition recommendations include moderation in carbohydrate intake. Exercise recommendations include exercising 3-5 times per week. No pharmacotherapy was ordered. Rationale for BMI follow-up plan is due to patient being overweight or obese. Depression Screening and Follow-up Plan: Patient was screened for depression during today's encounter. They screened negative with a PHQ-2 score of 0. Advised patient to take 0.25 mg of Xanax for 1 more month and then wean off to take half tablet at bedtime    There are no Patient Instructions on file for this visit. Return in about 6 months (around 6/12/2024). Subjective:      Patient ID: Maralee Crigler is a 48 y.o. female. Chief Complaint   Patient presents with    Follow-up     6 m f/u visit for hypertension, review labs from 10/19/23. Needs BMI f/u plan. HPI    Hypertension (Follow-Up): The patient presents for follow-up of essential hypertension. The patient states she has been doing well with her blood pressure control since the last visit. She has no comorbid illnesses. Interval Events:  Compliant with her medications. Lisinopril was stopped at last visit due to cough however replacement medication has not been started yet since she joined a gym. Since that time she has had difficulty keeping up with her athletic appointments because she found it too difficult and too expensive.   She will be provoking her gym membership.dec 2019 took 2 xanax before coming here, daughter drove her. Does not check BP at home, oct 2020 has been doing the ketogenic diet and feels much better. October 2021, doing well with keto diet home readings are good. Has not been losing weight recently however has been cheating with some carbs on a regular basis and feels the keto diet has stalled  May 2022, feeling sick today and under treatment. At Urgent Care last systolic was 767 last week  November 2022, feeling well, no issues. Has stopped checking blood pressure at home  May 2023: well controlled, no issues, compliant with meds  December 2023, well-controlled, no issues compliant with medications    Symptoms: denies impaired vision,-- denies dyspnea,-- denies chest pain,-- denies intermittent leg claudication-- and-- denies lower extremity edema. Associated symptoms include no headache,-- no focal neurologic deficits-- and-- no memory loss. Home monitoring: The patient checks her blood pressure sporadically. Blood pressure control has been better than our office  April 2021, improved. Circular diet October 2021, home readings are good  Lifestyle: Diet: She consumes a diverse and healthy diet. Weight Issues: She has weight concerns. Weight control issues: overweight. Smoking: The patient has never smoked cigarettes. Medications: the patient is adherent with her medication regimen. -- She denies medication side effects. Disease Management: the patient is not doing well with her blood pressure goals.  The patient is due for an eye exam.      Generalized anxiety : Doing okay, takes Xanax at bedtime to help her sleep, would like to wean off of it, was on 0.5 mg for many years now on 0.25    Hyperlipidemia, significant improvement in triglycerides, patient not on medication but has started working and going to the gym    Right ankle discomfort, feels that it is from something she did at the gym, has a podiatrist and she will make an appointment with them      The following portions of the patient's history were reviewed and updated as appropriate: allergies, current medications, past family history, past medical history, past social history, past surgical history and problem list.    Review of Systems      Constitutional:  Denies fever or chills   Eyes:  Denies double , blurry vision or eye pain  HENT:  Denies nasal congestion, sore throat or new hearing issues  Respiratory:  Denies cough or shortness of breath or wheezing  Cardiovascular:  Denies palpitations or chest pain  GI:  Denies abdominal pain, nausea, or vomiting, no loose stools, no reflux  Integument:  Denies rash , no open areas  Neurologic:  Denies headache or focal weakness, no dizziness  : no dysuria, or hematuria        Current Outpatient Medications   Medication Sig Dispense Refill    acyclovir (ZOVIRAX) 5 % ointment Apply topically 4 (four) times a day PRN 15 g 5    ALPRAZolam (XANAX) 0.25 mg tablet Take 1 tablet (0.25 mg total) by mouth daily at bedtime as needed for anxiety May take 1 extra tab daily if needed 60 tablet 0    amLODIPine (NORVASC) 5 mg tablet Take 1 tablet (5 mg total) by mouth daily 90 tablet 1    ergocalciferol (VITAMIN D2) 50,000 units Take 1 capsule (50,000 Units total) by mouth once a week 12 capsule 1    ibuprofen (MOTRIN) 200 mg tablet Take 200-800 mg by mouth every 6 (six) hours as needed        metoprolol succinate (TOPROL-XL) 50 mg 24 hr tablet Take 1 tablet (50 mg total) by mouth daily 90 tablet 1    spironolactone (ALDACTONE) 50 mg tablet Take 1 tablet (50 mg total) by mouth daily 90 tablet 1     No current facility-administered medications for this visit.        Objective:    /80 (BP Location: Left arm, Patient Position: Sitting, Cuff Size: Standard)   Pulse 75   Temp 98.7 °F (37.1 °C) (Temporal)   Ht 5' 2" (1.575 m)   Wt 83.9 kg (185 lb)   LMP 01/21/2019 Comment: AUB over past year  SpO2 99%   BMI 33.84 kg/m²        Physical Exam       Constitutional:  Well developed, well nourished, no acute distress, non-toxic appearance   Eyes:  PERRL, conjunctiva normal , non icteric sclera  HENT:  Atraumatic, oropharynx moist. Neck-  supple   Respiratory:  CTA b/l, normal breath sounds, no rales, no wheezing   Cardiovascular:  RRR, no murmurs, no LE edema b/l  GI:  Soft, nondistended, normal bowel sounds x 4, nontender, no organomegaly, no mass, no rebound, no guarding   Neurologic:  no focal deficits noted   Psychiatric:  Speech and behavior appropriate , AAO x 3  Musculoskeletal, right Achilles tendon tenderness to palpation    Verla Mater, DO

## 2024-01-04 DIAGNOSIS — B00.1 RECURRENT COLD SORES: ICD-10-CM

## 2024-01-04 DIAGNOSIS — F41.9 ANXIETY: ICD-10-CM

## 2024-01-04 RX ORDER — ALPRAZOLAM 0.25 MG/1
0.25 TABLET ORAL
Qty: 60 TABLET | Refills: 0 | Status: SHIPPED | OUTPATIENT
Start: 2024-01-04

## 2024-01-04 RX ORDER — ACYCLOVIR 50 MG/G
OINTMENT TOPICAL 4 TIMES DAILY
Qty: 15 G | Refills: 5 | Status: SHIPPED | OUTPATIENT
Start: 2024-01-04

## 2024-01-17 DIAGNOSIS — E55.9 VITAMIN D DEFICIENCY: ICD-10-CM

## 2024-01-17 DIAGNOSIS — I10 BENIGN ESSENTIAL HYPERTENSION: ICD-10-CM

## 2024-01-17 RX ORDER — AMLODIPINE BESYLATE 5 MG/1
5 TABLET ORAL DAILY
Qty: 90 TABLET | Refills: 1 | Status: SHIPPED | OUTPATIENT
Start: 2024-01-17

## 2024-01-17 RX ORDER — ERGOCALCIFEROL 1.25 MG/1
50000 CAPSULE ORAL WEEKLY
Qty: 12 CAPSULE | Refills: 1 | Status: SHIPPED | OUTPATIENT
Start: 2024-01-17

## 2024-02-23 ENCOUNTER — TELEPHONE (OUTPATIENT)
Dept: INTERNAL MEDICINE CLINIC | Age: 54
End: 2024-02-23

## 2024-02-23 NOTE — TELEPHONE ENCOUNTER
She can continue to use mucinex and flonase. She can also take vitamin c 500mg daily and zinc 50mg daily.   Stay well hydrated  Can alternate tylenol and ibuprofen if needed  She should avoid decongestants due to her HTN    She should isolate for 5 days from when her symptoms started, first day is day 0. As long as she is afebrile at day 5, she can discontinue isolation and continue to mask for an additional 5 days.

## 2024-02-23 NOTE — TELEPHONE ENCOUNTER
Patient called she tested positive for COVID today, no available appointments for virtual.  Symptoms started on Tuesday 2/20/24.  Patient has sinus congestion, slight cough with mucous.  Patient has been taking Mucinex and using Flonase.  Patient is asking if she should take any other OTC medications for symptoms.    Patient can be reached at [phone]    Thank you

## 2024-02-29 ENCOUNTER — ANNUAL EXAM (OUTPATIENT)
Dept: GYNECOLOGY | Facility: CLINIC | Age: 54
End: 2024-02-29
Payer: COMMERCIAL

## 2024-02-29 VITALS — SYSTOLIC BLOOD PRESSURE: 112 MMHG | DIASTOLIC BLOOD PRESSURE: 64 MMHG

## 2024-02-29 DIAGNOSIS — Z01.419 ENCOUNTER FOR GYNECOLOGICAL EXAMINATION WITHOUT ABNORMAL FINDING: Primary | ICD-10-CM

## 2024-02-29 DIAGNOSIS — F41.9 ANXIETY: ICD-10-CM

## 2024-02-29 DIAGNOSIS — N39.3 SUI (STRESS URINARY INCONTINENCE, FEMALE): ICD-10-CM

## 2024-02-29 PROCEDURE — 99396 PREV VISIT EST AGE 40-64: CPT | Performed by: OBSTETRICS & GYNECOLOGY

## 2024-02-29 RX ORDER — ALPRAZOLAM 0.25 MG/1
0.25 TABLET ORAL
Qty: 60 TABLET | Refills: 0 | Status: SHIPPED | OUTPATIENT
Start: 2024-02-29

## 2024-02-29 NOTE — PROGRESS NOTES
Assessment/Plan:         Diagnoses and all orders for this visit:    Encounter for gynecological examination without abnormal finding    LISETTE (stress urinary incontinence, female)  ; discussed surgical correction risks and benefits patient will consider    Subjective:      Patient ID: Christine M Wachter is a 53 y.o. female.    HPI patient presents the office for annual examination.  She offers no complaints.  She is status post [facility] BS in 2020.  She denies any vaginal irritation, burning, discharge or bleeding.  Denies any dysuria or hematuria or urgency.  She has stress urinary incontinence.  No GI complaints.    Colonoscopy 2020.  Polyps identified.  Advised repeat in 5 years    Osteoporosis screening via peripheral scan 2023.  0.1    The following portions of the patient's history were reviewed and updated as appropriate: She  has a past medical history of Abnormal uterine bleeding (AUB), Acute COVID-19 (2022), Anemia, Anxiety, BMI 32.0-32.9,adult (2022), COVID (2022), Endometrial polyp, High triglycerides, Hypertension, Seasonal allergies, Shingles (2023), Sleep apnea, Umbilical hernia, Vitamin D deficiency, and Wears contact lenses.  She   Patient Active Problem List    Diagnosis Date Noted    Travel advice encounter 03/10/2023    LATOSHA (obstructive sleep apnea) 2023    Family history of early CAD 2022    Hammer toes of both feet 10/05/2020    Status post laparoscopic hysterectomy 2020    White coat syndrome with diagnosis of hypertension 2019    Proptosis 2019    Vitamin D deficiency 2018    Essential hypertriglyceridemia 2015    Benign essential hypertension 02/10/2014    Generalized anxiety disorder 10/09/2013     She  has a past surgical history that includes  section; Breast surgery; Tubal ligation (Bilateral); Reduction mammaplasty; Hysteroscopy w/ polypectomy; pr hysteroscopy bx endometrium&/polypc w/wo d&c  (N/A, 1/21/2019); pr hysteroscopy endometrial ablation (N/A, 1/21/2019); [place] tooth extraction; CYSTOSCOPY (N/A, 2/24/2020); and Hysterectomy (N/A, 2/24/2020).  Her family history includes Hyperlipidemia in her father; Hypertension in her father; No Known Problems in her mother.  She  reports that she quit smoking about 35 years ago. Her smoking use included cigarettes. She started smoking about 39 years ago. She has a 1 pack-year smoking history. She has never used smokeless tobacco. She reports current alcohol use. She reports that she does not use drugs.  Current Outpatient Medications   Medication Sig Dispense Refill    acyclovir (ZOVIRAX) 5 % ointment Apply topically 4 (four) times a day PRN 15 g 5    ALPRAZolam (XANAX) 0.25 mg tablet Take 1 tablet (0.25 mg total) by mouth daily at bedtime as needed for anxiety May take 1 extra tab daily if needed 60 tablet 0    amLODIPine (NORVASC) 5 mg tablet Take 1 tablet (5 mg total) by mouth daily 90 tablet 1    ergocalciferol (VITAMIN D2) 50,000 units Take 1 capsule (50,000 Units total) by mouth once a week 12 capsule 1    ibuprofen (MOTRIN) 200 mg tablet Take 200-800 mg by mouth every 6 (six) hours as needed        metoprolol succinate (TOPROL-XL) 50 mg 24 hr tablet Take 1 tablet (50 mg total) by mouth daily 90 tablet 1    spironolactone (ALDACTONE) 50 mg tablet Take 1 tablet (50 mg total) by mouth daily 90 tablet 1     No current facility-administered medications for this visit.     Current Outpatient Medications on File Prior to Visit   Medication Sig    acyclovir (ZOVIRAX) 5 % ointment Apply topically 4 (four) times a day PRN    ALPRAZolam (XANAX) 0.25 mg tablet Take 1 tablet (0.25 mg total) by mouth daily at bedtime as needed for anxiety May take 1 extra tab daily if needed    amLODIPine (NORVASC) 5 mg tablet Take 1 tablet (5 mg total) by mouth daily    ergocalciferol (VITAMIN D2) 50,000 units Take 1 capsule (50,000 Units total) by mouth once a week    ibuprofen  (MOTRIN) 200 mg tablet Take 200-800 mg by mouth every 6 (six) hours as needed      metoprolol succinate (TOPROL-XL) 50 mg 24 hr tablet Take 1 tablet (50 mg total) by mouth daily    spironolactone (ALDACTONE) 50 mg tablet Take 1 tablet (50 mg total) by mouth daily     No current facility-administered medications on file prior to visit.     She is allergic to levofloxacin..    Review of Systems   Constitutional: Negative.    HENT:  Negative for sore throat and trouble swallowing.    Gastrointestinal: Negative.    Genitourinary:  Negative for difficulty urinating, dysuria, frequency, hematuria, pelvic pain and urgency.        LISETTE         Objective:      /64   LMP 01/21/2019 Comment: AUB over past year         Physical Exam  Vitals reviewed.   Cardiovascular:      Rate and Rhythm: Normal rate and regular rhythm.      Pulses: Normal pulses.      Heart sounds: Normal heart sounds. No murmur heard.  Pulmonary:      Effort: Pulmonary effort is normal. No respiratory distress.      Breath sounds: Normal breath sounds.   Chest:   Breasts:     Right: No swelling, bleeding, inverted nipple, mass, nipple discharge, skin change or tenderness.      Left: No swelling, bleeding, inverted nipple, mass, nipple discharge, skin change or tenderness.   Abdominal:      General: There is no distension.      Palpations: Abdomen is soft. There is no mass.      Tenderness: There is no abdominal tenderness. There is no guarding or rebound.      Hernia: No hernia is present. There is no hernia in the left inguinal area or right inguinal area.   Genitourinary:     General: Normal vulva.      Labia:         Right: No rash, tenderness or lesion.         Left: No rash, tenderness or lesion.       Vagina: Normal.      Uterus: Absent.       Adnexa:         Right: No mass, tenderness or fullness.          Left: No mass, tenderness or fullness.        Comments: Urethra hypermobile  Musculoskeletal:      Cervical back: Normal range of motion and  neck supple. No tenderness.   Lymphadenopathy:      Cervical: No cervical adenopathy.      Upper Body:      Right upper body: No supraclavicular, axillary or pectoral adenopathy.      Left upper body: No supraclavicular, axillary or pectoral adenopathy.      Lower Body: No right inguinal adenopathy. No left inguinal adenopathy.   Neurological:      Mental Status: She is alert.

## 2024-04-09 DIAGNOSIS — I10 BENIGN ESSENTIAL HYPERTENSION: ICD-10-CM

## 2024-04-09 RX ORDER — SPIRONOLACTONE 50 MG/1
50 TABLET, FILM COATED ORAL DAILY
Qty: 90 TABLET | Refills: 1 | Status: SHIPPED | OUTPATIENT
Start: 2024-04-09

## 2024-04-14 DIAGNOSIS — E55.9 VITAMIN D DEFICIENCY: ICD-10-CM

## 2024-04-15 RX ORDER — ERGOCALCIFEROL 1.25 MG/1
50000 CAPSULE ORAL WEEKLY
Qty: 12 CAPSULE | Refills: 1 | Status: SHIPPED | OUTPATIENT
Start: 2024-04-15

## 2024-04-21 DIAGNOSIS — I10 HYPERTENSION, UNSPECIFIED TYPE: ICD-10-CM

## 2024-04-21 DIAGNOSIS — F41.9 ANXIETY: ICD-10-CM

## 2024-04-21 DIAGNOSIS — I10 BENIGN ESSENTIAL HYPERTENSION: ICD-10-CM

## 2024-04-21 RX ORDER — ALPRAZOLAM 0.25 MG/1
0.25 TABLET ORAL
Qty: 60 TABLET | Refills: 0 | Status: CANCELLED | OUTPATIENT
Start: 2024-04-21

## 2024-04-22 RX ORDER — ALPRAZOLAM 0.25 MG/1
0.25 TABLET ORAL
Qty: 60 TABLET | Refills: 0 | Status: SHIPPED | OUTPATIENT
Start: 2024-04-22

## 2024-04-22 RX ORDER — AMLODIPINE BESYLATE 5 MG/1
5 TABLET ORAL DAILY
Qty: 90 TABLET | Refills: 0 | OUTPATIENT
Start: 2024-04-22

## 2024-04-22 RX ORDER — METOPROLOL SUCCINATE 50 MG/1
50 TABLET, EXTENDED RELEASE ORAL DAILY
Qty: 90 TABLET | Refills: 1 | Status: SHIPPED | OUTPATIENT
Start: 2024-04-22

## 2024-06-03 ENCOUNTER — APPOINTMENT (OUTPATIENT)
Dept: LAB | Facility: HOSPITAL | Age: 54
End: 2024-06-03
Payer: COMMERCIAL

## 2024-06-03 DIAGNOSIS — E78.1 ESSENTIAL HYPERTRIGLYCERIDEMIA: ICD-10-CM

## 2024-06-03 LAB
ALBUMIN SERPL BCP-MCNC: 4.8 G/DL (ref 3.5–5)
ALP SERPL-CCNC: 54 U/L (ref 34–104)
ALT SERPL W P-5'-P-CCNC: 30 U/L (ref 7–52)
ANION GAP SERPL CALCULATED.3IONS-SCNC: 6 MMOL/L (ref 4–13)
AST SERPL W P-5'-P-CCNC: 20 U/L (ref 13–39)
BASOPHILS # BLD AUTO: 0.04 THOUSANDS/ÂΜL (ref 0–0.1)
BASOPHILS NFR BLD AUTO: 1 % (ref 0–1)
BILIRUB SERPL-MCNC: 0.8 MG/DL (ref 0.2–1)
BUN SERPL-MCNC: 15 MG/DL (ref 5–25)
CALCIUM SERPL-MCNC: 10 MG/DL (ref 8.4–10.2)
CHLORIDE SERPL-SCNC: 102 MMOL/L (ref 96–108)
CHOLEST SERPL-MCNC: 154 MG/DL
CO2 SERPL-SCNC: 29 MMOL/L (ref 21–32)
CREAT SERPL-MCNC: 0.81 MG/DL (ref 0.6–1.3)
EOSINOPHIL # BLD AUTO: 0.12 THOUSAND/ÂΜL (ref 0–0.61)
EOSINOPHIL NFR BLD AUTO: 2 % (ref 0–6)
ERYTHROCYTE [DISTWIDTH] IN BLOOD BY AUTOMATED COUNT: 13.5 % (ref 11.6–15.1)
GFR SERPL CREATININE-BSD FRML MDRD: 82 ML/MIN/1.73SQ M
GLUCOSE P FAST SERPL-MCNC: 105 MG/DL (ref 65–99)
HCT VFR BLD AUTO: 41.3 % (ref 34.8–46.1)
HDLC SERPL-MCNC: 42 MG/DL
HGB BLD-MCNC: 14.3 G/DL (ref 11.5–15.4)
IMM GRANULOCYTES # BLD AUTO: 0.02 THOUSAND/UL (ref 0–0.2)
IMM GRANULOCYTES NFR BLD AUTO: 0 % (ref 0–2)
LDLC SERPL CALC-MCNC: 64 MG/DL (ref 0–100)
LYMPHOCYTES # BLD AUTO: 1.73 THOUSANDS/ÂΜL (ref 0.6–4.47)
LYMPHOCYTES NFR BLD AUTO: 31 % (ref 14–44)
MCH RBC QN AUTO: 29.1 PG (ref 26.8–34.3)
MCHC RBC AUTO-ENTMCNC: 34.6 G/DL (ref 31.4–37.4)
MCV RBC AUTO: 84 FL (ref 82–98)
MONOCYTES # BLD AUTO: 0.47 THOUSAND/ÂΜL (ref 0.17–1.22)
MONOCYTES NFR BLD AUTO: 8 % (ref 4–12)
NEUTROPHILS # BLD AUTO: 3.25 THOUSANDS/ÂΜL (ref 1.85–7.62)
NEUTS SEG NFR BLD AUTO: 58 % (ref 43–75)
NRBC BLD AUTO-RTO: 0 /100 WBCS
PLATELET # BLD AUTO: 220 THOUSANDS/UL (ref 149–390)
PMV BLD AUTO: 10.5 FL (ref 8.9–12.7)
POTASSIUM SERPL-SCNC: 4 MMOL/L (ref 3.5–5.3)
PROT SERPL-MCNC: 7.3 G/DL (ref 6.4–8.4)
RBC # BLD AUTO: 4.91 MILLION/UL (ref 3.81–5.12)
SODIUM SERPL-SCNC: 137 MMOL/L (ref 135–147)
TRIGL SERPL-MCNC: 240 MG/DL
WBC # BLD AUTO: 5.63 THOUSAND/UL (ref 4.31–10.16)

## 2024-06-03 PROCEDURE — 80053 COMPREHEN METABOLIC PANEL: CPT

## 2024-06-03 PROCEDURE — 85025 COMPLETE CBC W/AUTO DIFF WBC: CPT

## 2024-06-03 PROCEDURE — 80061 LIPID PANEL: CPT

## 2024-06-03 PROCEDURE — 36415 COLL VENOUS BLD VENIPUNCTURE: CPT

## 2024-06-04 ENCOUNTER — OFFICE VISIT (OUTPATIENT)
Age: 54
End: 2024-06-04
Payer: COMMERCIAL

## 2024-06-04 VITALS
BODY MASS INDEX: 34.04 KG/M2 | WEIGHT: 185 LBS | HEART RATE: 79 BPM | SYSTOLIC BLOOD PRESSURE: 124 MMHG | DIASTOLIC BLOOD PRESSURE: 80 MMHG | TEMPERATURE: 98.4 F | OXYGEN SATURATION: 99 % | HEIGHT: 62 IN

## 2024-06-04 DIAGNOSIS — I10 BENIGN ESSENTIAL HYPERTENSION: Primary | ICD-10-CM

## 2024-06-04 DIAGNOSIS — I10 WHITE COAT SYNDROME WITH DIAGNOSIS OF HYPERTENSION: ICD-10-CM

## 2024-06-04 DIAGNOSIS — E78.1 ESSENTIAL HYPERTRIGLYCERIDEMIA: ICD-10-CM

## 2024-06-04 DIAGNOSIS — E55.9 VITAMIN D DEFICIENCY: ICD-10-CM

## 2024-06-04 DIAGNOSIS — F41.1 GENERALIZED ANXIETY DISORDER: ICD-10-CM

## 2024-06-04 DIAGNOSIS — G47.33 OSA (OBSTRUCTIVE SLEEP APNEA): ICD-10-CM

## 2024-06-04 PROBLEM — Z71.84 TRAVEL ADVICE ENCOUNTER: Status: RESOLVED | Noted: 2023-03-10 | Resolved: 2024-06-04

## 2024-06-04 PROCEDURE — 99214 OFFICE O/P EST MOD 30 MIN: CPT | Performed by: FAMILY MEDICINE

## 2024-06-04 NOTE — PROGRESS NOTES
Assessment/Plan:    1. Benign essential hypertension  2. Essential hypertriglyceridemia  -     Lipid Panel with Direct LDL reflex; Future  3. Generalized anxiety disorder  4. Vitamin D deficiency  5. White coat syndrome with diagnosis of hypertension  6. LATOSHA (obstructive sleep apnea)          There are no Patient Instructions on file for this visit.    Return in about 4 months (around 10/4/2024).    Subjective:      Patient ID: Christine M Wachter is a 54 y.o. female.    Chief Complaint   Patient presents with    Hypertension    Follow-up     6 month follow up   Labs done on 6/3/2024    HM     Mammogram scheduled with HUMERA bermeo          [facility]    Hypertension (Follow-Up): The patient presents for follow-up of essential hypertension. The patient states she has been doing well with her blood pressure control since the last visit. She has no comorbid illnesses.   Interval Events:  Compliant with her medications.  Lisinopril was stopped at last visit due to cough however replacement medication has not been started yet since she joined a gym.  Since that time she has had difficulty keeping up with her athletic appointments because she found it too difficult and too expensive.  She will be provoking her gym membership.dec 2019 took 2 xanax before coming here, daughter drove her. Does not check BP at home, oct 2020 has been doing the ketogenic diet and feels much better.  October 2021, doing well with keto diet home readings are good.  Has not been losing weight recently however has been cheating with some carbs on a regular basis and feels the keto diet has stalled  May 2022, feeling sick today and under treatment.  At Urgent Care last systolic was 134 last week  November 2022, feeling well, no issues.  Has stopped checking blood pressure at home  May 2023: well controlled, no issues, compliant with meds  December 2023, well-controlled, no issues compliant with medications  June 2024: well controlled, on meds     Symptoms: denies impaired vision,-- denies dyspnea,-- denies chest pain,-- denies intermittent leg claudication-- and-- denies lower extremity edema. Associated symptoms include no headache,-- no focal neurologic deficits-- and-- no memory loss.   Home monitoring: The patient checks her blood pressure sporadically. Blood pressure control has been better than our office  April 2021, improved.  Circular diet October 2021, home readings are good  Lifestyle: Diet: She consumes a diverse and healthy diet.Weight Issues: She has weight concerns. Weight control issues: overweight.Smoking: The patient has never smoked cigarettes.   Medications: the patient is adherent with her medication regimen.-- She denies medication side effects.   Disease Management: the patient is not doing well with her blood pressure goals. The patient is due for an eye exam.      Generalized anxiety : Doing okay, takes Xanax at bedtime to help her sleep, would like to wean off of it, was on 0.5 mg for many years now on 0.25  June 2024: stable, planning her daughter bridal shower.no HI or Si     Hyperlipidemia, significant improvement in triglycerides, patient not on medication but has started working and going to the gym  June 2024: TG worsening, LDL controlled, pt has not been able to loose weight     The following portions of the patient's history were reviewed and updated as appropriate: allergies, current medications, past family history, past medical history, past social history, past surgical history and problem list.    Review of Systems      Constitutional:  Denies fever or chills   Eyes:  Denies double , blurry vision or eye pain  HENT:  Denies nasal congestion, sore throat or new hearing issues  Respiratory:  Denies cough or shortness of breath or wheezing  Cardiovascular:  Denies palpitations or chest pain  GI:  Denies abdominal pain, nausea, or vomiting, no loose stools, no reflux  Integument:  Denies rash , no open areas  Neurologic:   "Denies headache or focal weakness, no dizziness  : no dysuria, or hematuria        Current Outpatient Medications   Medication Sig Dispense Refill    acyclovir (ZOVIRAX) 5 % ointment Apply topically 4 (four) times a day PRN 15 g 5    ALPRAZolam (XANAX) 0.25 mg tablet Take 1 tablet (0.25 mg total) by mouth daily at bedtime as needed for anxiety May take 1 extra tab daily if needed 60 tablet 0    amLODIPine (NORVASC) 5 mg tablet Take 1 tablet (5 mg total) by mouth daily 90 tablet 1    ergocalciferol (VITAMIN D2) 50,000 units Take 1 capsule (50,000 Units total) by mouth once a week 12 capsule 1    ibuprofen (MOTRIN) 200 mg tablet Take 200-800 mg by mouth every 6 (six) hours as needed        metoprolol succinate (TOPROL-XL) 50 mg 24 hr tablet Take 1 tablet (50 mg total) by mouth daily 90 tablet 1    spironolactone (ALDACTONE) 50 mg tablet Take 1 tablet (50 mg total) by mouth daily 90 tablet 1     No current facility-administered medications for this visit.       Objective:    /80 (BP Location: Left arm, Patient Position: Sitting, Cuff Size: Adult)   Pulse 79   Temp 98.4 °F (36.9 °C) (Temporal)   Ht 5' 2\" (1.575 m)   Wt 83.9 kg (185 lb)   LMP 01/21/2019 Comment: AUB over past year  SpO2 99%   BMI 33.84 kg/m²        Physical Exam      Constitutional:  Well developed, well nourished, no acute distress, non-toxic appearance   Eyes:  PERRL, conjunctiva normal , non icteric sclera  HENT:  Atraumatic, oropharynx moist. Neck-  supple   Respiratory:  CTA b/l, normal breath sounds, no rales, no wheezing   Cardiovascular:  RRR, no murmurs, no LE edema b/l  GI:  Soft, nondistended, normal bowel sounds x 4, nontender, no organomegaly, no mass, no rebound, no guarding   Neurologic:  no focal deficits noted   Psychiatric:  Speech and behavior appropriate , AAO x 3      Elias Fahad, DO  "

## 2024-06-25 DIAGNOSIS — F41.9 ANXIETY: ICD-10-CM

## 2024-06-27 RX ORDER — ALPRAZOLAM 0.25 MG/1
0.25 TABLET ORAL
Qty: 60 TABLET | Refills: 0 | Status: SHIPPED | OUTPATIENT
Start: 2024-06-27

## 2024-07-14 DIAGNOSIS — I10 BENIGN ESSENTIAL HYPERTENSION: ICD-10-CM

## 2024-07-14 RX ORDER — SPIRONOLACTONE 50 MG/1
50 TABLET, FILM COATED ORAL DAILY
Qty: 100 TABLET | Refills: 1 | Status: SHIPPED | OUTPATIENT
Start: 2024-07-14

## 2024-08-28 DIAGNOSIS — F41.9 ANXIETY: ICD-10-CM

## 2024-08-29 RX ORDER — ALPRAZOLAM 0.25 MG
0.25 TABLET ORAL
Qty: 60 TABLET | Refills: 0 | Status: SHIPPED | OUTPATIENT
Start: 2024-08-29

## 2024-09-12 ENCOUNTER — OFFICE VISIT (OUTPATIENT)
Age: 54
End: 2024-09-12
Payer: COMMERCIAL

## 2024-09-12 VITALS — BODY MASS INDEX: 34.04 KG/M2 | WEIGHT: 185 LBS | HEIGHT: 62 IN

## 2024-09-12 DIAGNOSIS — K62.5 RECTAL BLEEDING: Primary | ICD-10-CM

## 2024-09-12 PROCEDURE — 46600 DIAGNOSTIC ANOSCOPY SPX: CPT | Performed by: COLON & RECTAL SURGERY

## 2024-09-12 PROCEDURE — 99203 OFFICE O/P NEW LOW 30 MIN: CPT | Performed by: COLON & RECTAL SURGERY

## 2024-09-12 RX ORDER — ALBUTEROL SULFATE 90 UG/1
AEROSOL, METERED RESPIRATORY (INHALATION)
COMMUNITY
Start: 2024-09-11

## 2024-09-12 RX ORDER — PREDNISONE 20 MG/1
TABLET ORAL
COMMUNITY
Start: 2024-09-09

## 2024-09-12 RX ORDER — CODEINE PHOSPHATE AND GUAIFENESIN 10; 100 MG/5ML; MG/5ML
SOLUTION ORAL
COMMUNITY
Start: 2024-09-11

## 2024-09-12 NOTE — PROGRESS NOTES
Diagnoses and all orders for this visit:    Rectal bleeding       Rectal bleeding  Patient presents for evaluation of rectal bleeding.  Patient notes that recently she has had a respiratory illness with significant coughing.  With that she notes a small amount of bright red blood with wiping for each of her bowel movements for the past 3 days.  No significant change in bowel movements themselves.  No new lumps or bumps that she notes.  Examination shows small grade 2 internal hemorrhoids with green formed stool in the anal rectal vault.    I suspect she has had some degree of outlet bleeding likely from hemorrhoids with recent coughing.  She does not have any obvious dominant area on examination today and internal examination reveals no large volume of bleeding within the rectum proper.  As such I recommend only observation.  If she notes persistent or worsening bleeding over time, she will call for reevaluation.  Otherwise colonoscopy is previously recommended for screening purposes.      HPI    Christine M Wachter presents with concerns of rectal bleeding.     The patient notes episodes of  bright red rectal bleeding with bowel movements, Monday, Wednesday and today.       Last colonoscopy performed on 2020 by VI Carlson, with a 5 year recall.      Past Medical History:   Diagnosis Date    Abnormal uterine bleeding (AUB)     Acute COVID-19 2022    Anemia     Anxiety     BMI 32.0-32.9,adult 2022    COVID 2022    Endometrial polyp     High triglycerides     Hypertension     Seasonal allergies     Shingles 2023    Sleep apnea     uses mouth guard    Travel advice encounter 03/10/2023    Umbilical hernia     Vitamin D deficiency     Wears contact lenses      Past Surgical History:   Procedure Laterality Date    BREAST SURGERY       SECTION      x3    CYSTOSCOPY N/A 2020    Procedure: CYSTOSCOPY;  Surgeon: Ramos Catherine DO;  Location: [facility];  Service: Gynecology     HYSTERECTOMY N/A 2/24/2020    Procedure: HYSTERECTOMY LAPAROSCOPIC TOTAL (LTH) WITH B/L SALPINGECTOMY;  Surgeon: Ramos Catherine DO;  Location: [facility];  Service: Gynecology    HYSTEROSCOPY W/ POLYPECTOMY      AR HYSTEROSCOPY BX ENDOMETRIUM&/POLYPC W/WO D&C N/A 1/21/2019    Procedure: DILATATION AND CURETTAGE (D&C) WITH HYSTEROSCOPY WITH POLYPECTOMY;  Surgeon: Ramos Catherine DO;  Location: [facility];  Service: Gynecology    AR HYSTEROSCOPY ENDOMETRIAL ABLATION N/A 1/21/2019    Procedure: ABLATION ENDOMETRIAL NOVASURE;  Surgeon: Ramos Catherine DO;  Location: [facility];  Service: Gynecology    REDUCTION MAMMAPLASTY      TUBAL LIGATION Bilateral     WISDOM TOOTH EXTRACTION         Current Outpatient Medications:     albuterol (PROVENTIL HFA,VENTOLIN HFA) 90 mcg/act inhaler, inhale 2 puffs as directed three times a day if needed, Disp: , Rfl:     ALPRAZolam (XANAX) 0.25 mg tablet, Take 1 tablet (0.25 mg total) by mouth daily at bedtime as needed for anxiety May take 1 extra tab daily if needed, Disp: 60 tablet, Rfl: 0    amLODIPine (NORVASC) 5 mg tablet, Take 1 tablet (5 mg total) by mouth daily, Disp: 90 tablet, Rfl: 1    amoxicillin-clavulanate (AUGMENTIN) 875-125 mg per tablet, Take 1 tablet by mouth every 12 (twelve) hours, Disp: , Rfl:     ergocalciferol (VITAMIN D2) 50,000 units, Take 1 capsule (50,000 Units total) by mouth once a week, Disp: 12 capsule, Rfl: 1    guaiFENesin-codeine (ROBITUSSIN AC) 100-10 mg/5 mL oral solution, take 5 MILLILITER by mouth every 4 hours, Disp: , Rfl:     ibuprofen (MOTRIN) 200 mg tablet, Take 200-800 mg by mouth every 6 (six) hours as needed  , Disp: , Rfl:     metoprolol succinate (TOPROL-XL) 50 mg 24 hr tablet, Take 1 tablet (50 mg total) by mouth daily, Disp: 90 tablet, Rfl: 1    predniSONE 20 mg tablet, take 2 tablets by mouth daily for 3 days then 1 tablet by mouth daily for 4 days, Disp: , Rfl:     spironolactone (ALDACTONE) 50 mg tablet, Take 1 tablet (50 mg  total) by mouth daily, Disp: 100 tablet, Rfl: 1    acyclovir (ZOVIRAX) 5 % ointment, Apply topically 4 (four) times a day PRN, Disp: 15 g, Rfl: 5  Allergies as of 09/12/2024 - Reviewed 09/12/2024   Allergen Reaction Noted    Levofloxacin Rash 02/19/2017     Review of Systems   Respiratory:  Positive for cough.    All other systems reviewed and are negative.    There were no vitals filed for this visit.  Physical Exam  Constitutional:       Appearance: Normal appearance.   HENT:      Head: Normocephalic and atraumatic.   Eyes:      Extraocular Movements: Extraocular movements intact.      Pupils: Pupils are equal, round, and reactive to light.   Pulmonary:      Effort: Pulmonary effort is normal.   Abdominal:      General: Abdomen is flat.   Musculoskeletal:         General: Normal range of motion.   Skin:     General: Skin is warm and dry.   Neurological:      General: No focal deficit present.      Mental Status: She is alert and oriented to person, place, and time.   Psychiatric:         Mood and Affect: Mood normal.         Behavior: Behavior normal.         Thought Content: Thought content normal.         Judgment: Judgment normal.     Lower Endoscopy    Date/Time: 9/12/2024 3:40 PM    Performed by: Js aWlter MD  Authorized by: Js Walter MD    Verbal consent obtained?: Yes    Risks and benefits: Risks, benefits and alternatives were discussed    Consent given by:  Patient  Indications: rectal bleeding    Patient sedated: No    Scope type:  Anoscope  External exam performed: Yes    Pilonidal sinus tract: No    Pilonidal cyst: No    Pilonidal tenderness: No    Perianal skin tags: Yes    Perirectal warts: No    Perianal maceration: No    Perianal induration: No    Perianal erythema: No    External hemorrhoids: No    Digital exam performed: Yes    Laxity of anal sphincter: No    Internal hemorrhoids: Yes    Prolapsed: No    Intraluminal mass: No    Inflammation: No    Anal fissures: No     Anal fistulae: No    Anal stricture: No    Abscess: No    Procedure termination:  Procedure complete  Patient tolerance:  Patient tolerated the procedure well with no immediate complications

## 2024-09-12 NOTE — ASSESSMENT & PLAN NOTE
Patient presents for evaluation of rectal bleeding.  Patient notes that recently she has had a respiratory illness with significant coughing.  With that she notes a small amount of bright red blood with wiping for each of her bowel movements for the past 3 days.  No significant change in bowel movements themselves.  No new lumps or bumps that she notes.  Examination shows small grade 2 internal hemorrhoids with green formed stool in the anal rectal vault.    I suspect she has had some degree of outlet bleeding likely from hemorrhoids with recent coughing.  She does not have any obvious dominant area on examination today and internal examination reveals no large volume of bleeding within the rectum proper.  As such I recommend only observation.  If she notes persistent or worsening bleeding over time, she will call for reevaluation.  Otherwise colonoscopy is previously recommended for screening purposes.

## 2024-10-03 ENCOUNTER — TELEPHONE (OUTPATIENT)
Age: 54
End: 2024-10-03

## 2024-10-03 NOTE — TELEPHONE ENCOUNTER
Patient call to ask doctor Fahad if she soul come in for a visit due to a bad cough she has. Patient went to an urgent care for a bronchitis and still has the cough. Please advance the patient if she should come in or not. Thank you

## 2024-10-15 DIAGNOSIS — I10 BENIGN ESSENTIAL HYPERTENSION: ICD-10-CM

## 2024-10-15 RX ORDER — AMLODIPINE BESYLATE 5 MG/1
5 TABLET ORAL DAILY
Qty: 90 TABLET | Refills: 1 | Status: SHIPPED | OUTPATIENT
Start: 2024-10-15

## 2024-10-22 ENCOUNTER — OFFICE VISIT (OUTPATIENT)
Dept: INTERNAL MEDICINE CLINIC | Age: 54
End: 2024-10-22
Payer: COMMERCIAL

## 2024-10-22 VITALS
HEIGHT: 62 IN | WEIGHT: 184 LBS | BODY MASS INDEX: 33.86 KG/M2 | TEMPERATURE: 98.4 F | HEART RATE: 92 BPM | DIASTOLIC BLOOD PRESSURE: 80 MMHG | OXYGEN SATURATION: 97 % | SYSTOLIC BLOOD PRESSURE: 132 MMHG

## 2024-10-22 DIAGNOSIS — E78.1 ESSENTIAL HYPERTRIGLYCERIDEMIA: ICD-10-CM

## 2024-10-22 DIAGNOSIS — Z23 NEED FOR INFLUENZA VACCINATION: ICD-10-CM

## 2024-10-22 DIAGNOSIS — F41.1 GENERALIZED ANXIETY DISORDER: ICD-10-CM

## 2024-10-22 DIAGNOSIS — I10 BENIGN ESSENTIAL HYPERTENSION: Primary | ICD-10-CM

## 2024-10-22 DIAGNOSIS — R73.09 ABNORMAL GLUCOSE: ICD-10-CM

## 2024-10-22 DIAGNOSIS — E55.9 VITAMIN D DEFICIENCY: ICD-10-CM

## 2024-10-22 DIAGNOSIS — Z23 ENCOUNTER FOR IMMUNIZATION: ICD-10-CM

## 2024-10-22 DIAGNOSIS — Z12.31 ENCOUNTER FOR SCREENING MAMMOGRAM FOR MALIGNANT NEOPLASM OF BREAST: ICD-10-CM

## 2024-10-22 PROCEDURE — 90471 IMMUNIZATION ADMIN: CPT

## 2024-10-22 PROCEDURE — 90673 RIV3 VACCINE NO PRESERV IM: CPT

## 2024-10-22 PROCEDURE — 99214 OFFICE O/P EST MOD 30 MIN: CPT | Performed by: FAMILY MEDICINE

## 2024-10-22 NOTE — PROGRESS NOTES
Assessment/Plan:    1. Benign essential hypertension  2. Encounter for screening mammogram for malignant neoplasm of breast  -     Mammo screening bilateral w 3d and cad; Future; Expected date: 11/01/2024  3. Essential hypertriglyceridemia  -     Lipid Panel with Direct LDL reflex; Future; Expected date: 04/22/2025  4. Generalized anxiety disorder  -     CBC and differential; Future; Expected date: 04/22/2025  5. Vitamin D deficiency  6. Abnormal glucose  -     Hemoglobin A1C; Future; Expected date: 04/22/2025  -     Comprehensive metabolic panel; Future; Expected date: 04/22/2025  7. Encounter for immunization  -     influenza vaccine, recombinant, PF, 0.5 mL IM (Flublok)  8. Need for influenza vaccination  -     influenza vaccine, recombinant, PF, 0.5 mL IM (Flublok)          There are no Patient Instructions on file for this visit.    Return in about 6 months (around 4/22/2025).    Subjective:      Patient ID: Christine M Wachter is a 54 y.o. female.    Chief Complaint   Patient presents with    Follow-up     Follow up  Patient discuss flu shot patient has had bronchitis        HPI    Hypertension (Follow-Up): The patient presents for follow-up of essential hypertension. The patient states she has been doing well with her blood pressure control since the last visit. She has no comorbid illnesses.   Interval Events:  Compliant with her medications.  Lisinopril was stopped at last visit due to cough however replacement medication has not been started yet since she joined a gym.  Since that time she has had difficulty keeping up with her athletic appointments because she found it too difficult and too expensive.  She will be provoking her gym membership.dec 2019 took 2 xanax before coming here, daughter drove her. Does not check BP at home, oct 2020 has been doing the ketogenic diet and feels much better.  October 2021, doing well with keto diet home readings are good.  Has not been losing weight recently however  has been cheating with some carbs on a regular basis and feels the keto diet has stalled  May 2022, feeling sick today and under treatment.  At Urgent Care last systolic was 134 last week  November 2022, feeling well, no issues.  Has stopped checking blood pressure at home  May 2023: well controlled, no issues, compliant with meds  December 2023, well-controlled, no issues compliant with medications  June 2024: well controlled, on meds  October 2024, doing well and trying to exercise more.  Blood pressure in range, has weaned herself off of Xanax    Symptoms: denies impaired vision,-- denies dyspnea,-- denies chest pain,-- denies intermittent leg claudication-- and-- denies lower extremity edema. Associated symptoms include no headache,-- no focal neurologic deficits-- and-- no memory loss.   Home monitoring: The patient checks her blood pressure sporadically. Blood pressure control has been better than our office  April 2021, improved.  Circular diet October 2021, home readings are good  Lifestyle: Diet: She consumes a diverse and healthy diet.Weight Issues: She has weight concerns. Weight control issues: overweight.Smoking: The patient has never smoked cigarettes.   Medications: the patient is adherent with her medication regimen.-- She denies medication side effects.   Disease Management: the patient is not doing well with her blood pressure goals. The patient is due for an eye exam.        Generalized anxiety : Doing okay, takes Xanax at bedtime to help her sleep, would like to wean off of it, was on 0.5 mg for many years now on 0.25  June 2024: stable, planning her daughter bridal shower.no HI or Si  October 2024, has weaned herself off of Xanax, generally doing well, no HI or SI    Hyperlipidemia, significant improvement in triglycerides, patient not on medication but has started working and going to the gym  June 2024: TG worsening, LDL controlled, pt has not been able to loose weight   October 2024, due for  lab work but was sick and did not get her lipid levels.      Bronchitis, ongoing and most recently treated on October 5.  Is back to her normal however required 2 rounds of antibiotics and steroids and a chest x-ray.      The following portions of the patient's history were reviewed and updated as appropriate: allergies, current medications, past family history, past medical history, past social history, past surgical history and problem list.    Review of Systems      Constitutional:  Denies fever or chills   Eyes:  Denies double , blurry vision or eye pain  HENT:  Denies nasal congestion, sore throat or new hearing issues  Respiratory:  Denies cough or shortness of breath or wheezing  Cardiovascular:  Denies palpitations or chest pain  GI:  Denies abdominal pain, nausea, or vomiting, no loose stools, no reflux  Integument:  Denies rash , no open areas  Neurologic:  Denies headache or focal weakness, no dizziness  : no dysuria, or hematuria        Current Outpatient Medications   Medication Sig Dispense Refill    acyclovir (ZOVIRAX) 5 % ointment Apply topically 4 (four) times a day PRN 15 g 5    albuterol (PROVENTIL HFA,VENTOLIN HFA) 90 mcg/act inhaler inhale 2 puffs as directed three times a day if needed      amLODIPine (NORVASC) 5 mg tablet take 1 tablet by mouth once daily 90 tablet 1    ergocalciferol (VITAMIN D2) 50,000 units Take 1 capsule (50,000 Units total) by mouth once a week 12 capsule 1    metoprolol succinate (TOPROL-XL) 50 mg 24 hr tablet Take 1 tablet (50 mg total) by mouth daily 90 tablet 1    ALPRAZolam (XANAX) 0.25 mg tablet Take 1 tablet (0.25 mg total) by mouth daily at bedtime as needed for anxiety May take 1 extra tab daily if needed (Patient not taking: Reported on 10/22/2024) 60 tablet 0    spironolactone (ALDACTONE) 50 mg tablet Take 1 tablet (50 mg total) by mouth daily 100 tablet 1     No current facility-administered medications for this visit.       Objective:    /80 (BP  "Location: Left arm, Patient Position: Sitting, Cuff Size: Standard)   Pulse 92   Temp 98.4 °F (36.9 °C) (Temporal)   Ht 5' 2\" (1.575 m)   Wt 83.5 kg (184 lb)   LMP 01/21/2019 Comment: AUB over past year  SpO2 97%   BMI 33.65 kg/m²        Physical Exam      Constitutional:  Well developed, well nourished, no acute distress, non-toxic appearance   Eyes:  PERRL, conjunctiva normal , non icteric sclera  HENT:  Atraumatic, oropharynx moist. Neck-  supple   Respiratory:  CTA b/l, normal breath sounds, no rales, no wheezing   Cardiovascular:  RRR, no murmurs, no LE edema b/l  GI:  Soft, nondistended, normal bowel sounds x 4, nontender, no organomegaly, no mass, no rebound, no guarding   Neurologic:  no focal deficits noted   Psychiatric:  Speech and behavior appropriate , AAO x 3      Curt Vásquez DO  "

## 2024-10-28 DIAGNOSIS — I10 HYPERTENSION, UNSPECIFIED TYPE: ICD-10-CM

## 2024-10-28 RX ORDER — METOPROLOL SUCCINATE 50 MG/1
50 TABLET, EXTENDED RELEASE ORAL DAILY
Qty: 90 TABLET | Refills: 1 | Status: SHIPPED | OUTPATIENT
Start: 2024-10-28

## 2024-11-06 ENCOUNTER — CLINICAL SUPPORT (OUTPATIENT)
Dept: INTERNAL MEDICINE CLINIC | Age: 54
End: 2024-11-06
Payer: COMMERCIAL

## 2024-11-06 DIAGNOSIS — Z23 NEED FOR SHINGLES VACCINE: Primary | ICD-10-CM

## 2024-11-06 PROCEDURE — 90750 HZV VACC RECOMBINANT IM: CPT

## 2024-11-06 PROCEDURE — 90471 IMMUNIZATION ADMIN: CPT

## 2024-12-04 DIAGNOSIS — Z12.31 ENCOUNTER FOR SCREENING MAMMOGRAM FOR MALIGNANT NEOPLASM OF BREAST: ICD-10-CM

## 2024-12-18 ENCOUNTER — OFFICE VISIT (OUTPATIENT)
Dept: URGENT CARE | Age: 54
End: 2024-12-18
Payer: COMMERCIAL

## 2024-12-18 VITALS
HEART RATE: 100 BPM | RESPIRATION RATE: 18 BRPM | TEMPERATURE: 98.7 F | SYSTOLIC BLOOD PRESSURE: 156 MMHG | WEIGHT: 180 LBS | BODY MASS INDEX: 32.92 KG/M2 | DIASTOLIC BLOOD PRESSURE: 92 MMHG | OXYGEN SATURATION: 99 %

## 2024-12-18 DIAGNOSIS — J06.9 VIRAL URI: Primary | ICD-10-CM

## 2024-12-18 DIAGNOSIS — J02.9 SORE THROAT: ICD-10-CM

## 2024-12-18 LAB — S PYO AG THROAT QL: NEGATIVE

## 2024-12-18 PROCEDURE — G0382 LEV 3 HOSP TYPE B ED VISIT: HCPCS | Performed by: STUDENT IN AN ORGANIZED HEALTH CARE EDUCATION/TRAINING PROGRAM

## 2024-12-18 PROCEDURE — 87880 STREP A ASSAY W/OPTIC: CPT | Performed by: STUDENT IN AN ORGANIZED HEALTH CARE EDUCATION/TRAINING PROGRAM

## 2024-12-18 NOTE — LETTER
December 18, 2024     Patient: Christine M Wachter   YOB: 1970   Date of Visit: 12/18/2024       To Whom it May Concern:    Christine Wachter was seen in my clinic on 12/18/2024.  Please excuse her from work on 12/19/2024.  If you have any questions or concerns, please don't hesitate to call.         Sincerely,          Twila Mock, DO

## 2024-12-19 NOTE — PROGRESS NOTES
[facility]        NAME: Christine M Wachter is a 54 y.o. female  : 1970    MRN: [id number]  DATE: 2024  TIME: 7:42 PM    Assessment and Plan   Viral URI [J06.9]  1. Viral URI        2. Sore throat  POCT rapid ANTIGEN strepA            Patient Instructions   Your rapid strep test was negative.  Your symptoms are consistent with a viral upper respiratory infection.  To help with congestion, I recommend taking plain Mucinex (guaifenesin) 600 to 1200 mg every 12 hours.  It is important to take it with plenty of water.  To help clear out the mucus and reduce post-nasal drip which can cause sore throat and cough - use saline nasal spray or saline nasal rinse (with distilled or boiled water).  To soothe sore throat, you may try warm tea with honey, warm salt water gargles.  You may take Tylenol or Motrin to help with throat pain.    Stay well hydrated and get plenty of rest.     If your symptoms are worsening or fail to improve in the coming days, please return to see us or schedule with your PCP.  If you develop any severe/worsening symptoms please go to the ER.      Chief Complaint     Chief Complaint   Patient presents with    Cough    Sore Throat    Earache     Pt states she has had right ear itchiness, cough and started with severe sore throat last evening.          History of Present Illness       Patient presents for symptoms that started approximate 4 days ago.  Started with right ear itching/discomfort, nasal congestion, sore throat.  Over the last 1 day, sore throat has worsened.  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  Several of her students have walking pneumonia.  She took a home COVID test that was negative.  She is concerned for possible infection going into the holiday season.    Cough  Associated symptoms include ear pain and a sore throat.   Sore Throat   Associated symptoms include coughing and ear pain.   Earache    Associated symptoms include coughing and a sore throat.       Review of Systems   Review of Systems   HENT:  Positive for ear pain and sore throat.    Respiratory:  Positive for cough.    All other systems reviewed and are negative.        Current Medications       Current Outpatient Medications:     albuterol (PROVENTIL HFA,VENTOLIN HFA) 90 mcg/act inhaler, inhale 2 puffs as directed three times a day if needed, Disp: , Rfl:     amLODIPine (NORVASC) 5 mg tablet, take 1 tablet by mouth once daily, Disp: 90 tablet, Rfl: 1    ergocalciferol (VITAMIN D2) 50,000 units, Take 1 capsule (50,000 Units total) by mouth once a week, Disp: 12 capsule, Rfl: 1    metoprolol succinate (TOPROL-XL) 50 mg 24 hr tablet, Take 1 tablet (50 mg total) by mouth daily, Disp: 90 tablet, Rfl: 1    spironolactone (ALDACTONE) 50 mg tablet, Take 1 tablet (50 mg total) by mouth daily, Disp: 100 tablet, Rfl: 1    acyclovir (ZOVIRAX) 5 % ointment, Apply topically 4 (four) times a day PRN (Patient not taking: Reported on 12/18/2024), Disp: 15 g, Rfl: 5    ALPRAZolam (XANAX) 0.25 mg tablet, Take 1 tablet (0.25 mg total) by mouth daily at bedtime as needed for anxiety May take 1 extra tab daily if needed (Patient not taking: Reported on 10/22/2024), Disp: 60 tablet, Rfl: 0    Current Allergies     Allergies as of 12/18/2024 - Reviewed 12/18/2024   Allergen Reaction Noted    Levofloxacin Rash 02/19/2017            The following portions of the patient's history were reviewed and updated as appropriate: allergies, current medications, past family history, past medical history, past social history, past surgical history and problem list.     Past Medical History:   Diagnosis Date    Abnormal uterine bleeding (AUB)     Acute COVID-19 07/19/2022    Anemia     Anxiety     BMI 32.0-32.9,adult 11/14/2022    COVID 07/16/2022    Endometrial polyp     High triglycerides     Hypertension     Seasonal allergies     Shingles 11/03/2023    Sleep apnea     uses  mouth guard    Travel advice encounter 03/10/2023    Umbilical hernia     Vitamin D deficiency     Wears contact lenses        Past Surgical History:   Procedure Laterality Date    BREAST SURGERY       SECTION      x3    CYSTOSCOPY N/A 2020    Procedure: CYSTOSCOPY;  Surgeon: Ramos Catherine DO;  Location: [facility];  Service: Gynecology    HYSTERECTOMY N/A 2020    Procedure: HYSTERECTOMY LAPAROSCOPIC TOTAL (LTH) WITH B/L SALPINGECTOMY;  Surgeon: Ramos Catherine DO;  Location[facility];  Service: Gynecology    HYSTEROSCOPY W/ POLYPECTOMY      SD HYSTEROSCOPY BX ENDOMETRIUM&/POLYPC W/WO D&C N/A 2019    Procedure: DILATATION AND CURETTAGE (D&C) WITH HYSTEROSCOPY WITH POLYPECTOMY;  Surgeon: Ramos Catherine DO;  Location: [facility];  Service: Gynecology    SD HYSTEROSCOPY ENDOMETRIAL ABLATION N/A 2019    Procedure: ABLATION ENDOMETRIAL NOVASURE;  Surgeon: Ramos Catherine DO;  Location: [facility];  Service: Gynecology    REDUCTION MAMMAPLASTY      TUBAL LIGATION Bilateral     WISDOM TOOTH EXTRACTION         Family History   Problem Relation Age of Onset    Hypertension Father         BENIGN ESSENTIAL    Hyperlipidemia Father     No Known Problems Mother          Medications have been verified.        Objective   /92   Pulse 100   Temp 98.7 °F (37.1 °C)   Resp 18   Wt 81.6 kg (180 lb)   LMP 2019 Comment: AUB over past year  SpO2 99%   BMI 32.92 kg/m²   Patient's last menstrual period was 2019.       Physical Exam     Physical Exam  Vitals and nursing note reviewed.   Constitutional:       General: She is not in acute distress.     Appearance: Normal appearance. She is not ill-appearing or toxic-appearing.   HENT:      Head: Normocephalic and atraumatic.      Right Ear: Ear canal and external ear normal.      Left Ear: Ear canal and external ear normal.      Ears:      Comments: Right clear middle ear effusion     Nose: Congestion present.       Mouth/Throat:      Mouth: Mucous membranes are moist.      Pharynx: Posterior oropharyngeal erythema present.      Comments: Clear PND, cobblestoning, erythema  Eyes:      Extraocular Movements: Extraocular movements intact.   Cardiovascular:      Rate and Rhythm: Normal rate and regular rhythm.      Heart sounds: Normal heart sounds.   Pulmonary:      Effort: Pulmonary effort is normal. No respiratory distress.      Breath sounds: Normal breath sounds. No stridor. No wheezing, rhonchi or rales.   Skin:     General: Skin is warm and dry.      Capillary Refill: Capillary refill takes less than 2 seconds.      Findings: No rash.   Neurological:      Mental Status: She is alert.      Gait: Gait normal.   Psychiatric:         Behavior: Behavior normal.

## 2024-12-19 NOTE — PATIENT INSTRUCTIONS
Your rapid strep test was negative.  Your symptoms are consistent with a viral upper respiratory infection.  To help with congestion, I recommend taking plain Mucinex (guaifenesin) 600 to 1200 mg every 12 hours.  It is important to take it with plenty of water.  To help clear out the mucus and reduce post-nasal drip which can cause sore throat and cough - use saline nasal spray or saline nasal rinse (with distilled or boiled water).  To soothe sore throat, you may try warm tea with honey, warm salt water gargles.  You may take Tylenol or Motrin to help with throat pain.    Stay well hydrated and get plenty of rest.     If your symptoms are worsening or fail to improve in the coming days, please return to see us or schedule with your PCP.  If you develop any severe/worsening symptoms please go to the ER.      Nasal Saline Rinse:

## 2025-03-06 ENCOUNTER — ANNUAL EXAM (OUTPATIENT)
Dept: GYNECOLOGY | Facility: CLINIC | Age: 55
End: 2025-03-06
Payer: COMMERCIAL

## 2025-03-06 VITALS
BODY MASS INDEX: 32.92 KG/M2 | HEIGHT: 62 IN | SYSTOLIC BLOOD PRESSURE: 156 MMHG | HEART RATE: 94 BPM | DIASTOLIC BLOOD PRESSURE: 98 MMHG

## 2025-03-06 DIAGNOSIS — N39.3 SUI (STRESS URINARY INCONTINENCE, FEMALE): ICD-10-CM

## 2025-03-06 DIAGNOSIS — N95.2 ATROPHIC VAGINITIS: ICD-10-CM

## 2025-03-06 DIAGNOSIS — Z13.820 SCREENING FOR OSTEOPOROSIS: ICD-10-CM

## 2025-03-06 DIAGNOSIS — Z01.419 ENCOUNTER FOR GYNECOLOGICAL EXAMINATION WITHOUT ABNORMAL FINDING: Primary | ICD-10-CM

## 2025-03-06 PROCEDURE — 76977 US BONE DENSITY MEASURE: CPT | Performed by: OBSTETRICS & GYNECOLOGY

## 2025-03-06 PROCEDURE — 99396 PREV VISIT EST AGE 40-64: CPT | Performed by: OBSTETRICS & GYNECOLOGY

## 2025-03-06 RX ORDER — ESTRADIOL 0.1 MG/G
CREAM VAGINAL
Qty: 42.5 G | Refills: 3 | Status: SHIPPED | OUTPATIENT
Start: 2025-03-06

## 2025-03-06 NOTE — PROGRESS NOTES
"Name: Christine M Wachter      : 1970      MRN: [id number]  Encounter Provider: Ramos Catherine DO  Encounter Date: 3/6/2025   Encounter department: [facility]  :  Assessment & Plan  Encounter for gynecological examination without abnormal finding         LISETTE (stress urinary incontinence, female)  Again discussed surgical correction.  Patient will contact office when she is ready to proceed.       Screening for osteoporosis  Peripheral scan: +0.9       Atrophic vaginitis    Orders:    estradiol (ESTRACE) 0.1 mg/g vaginal cream; 1 g vaginally  for 3 weeks then once weekly        History of Present Illness   HPI  Christine M Wachter is a 54 y.o. female who presents for annual examination.  Status post [facility] BS and 2020.  Patient denies any vaginal irritation, burning, discharge or bleeding.  She denies any dysuria, hematuria.  She does have stress urinary incontinence.  Patient is experiencing vaginal dryness and dyspareunia    Colonoscopy 2020.  Advised repeat in 5 years secondary to history of polyps    Osteoporosis screening via peripheral scan 2023.  0.1      Review of Systems   Constitutional: Negative.    HENT:  Negative for sore throat and trouble swallowing.    Gastrointestinal: Negative.    Genitourinary: Negative.           Objective   /98   Pulse 94   Ht 5' 2\" (1.575 m)   LMP 2019 Comment: AUB over past year  BMI 32.92 kg/m²      Physical Exam  Vitals reviewed.   Cardiovascular:      Rate and Rhythm: Normal rate and regular rhythm.      Pulses: Normal pulses.      Heart sounds: Normal heart sounds.   Pulmonary:      Effort: Pulmonary effort is normal.      Breath sounds: Normal breath sounds.   Chest:   Breasts:     Right: No swelling, bleeding, inverted nipple, mass, nipple discharge, skin change or tenderness.      Left: No swelling, bleeding, inverted nipple, mass, nipple discharge, skin change " or tenderness.   Abdominal:      General: There is no distension.      Palpations: Abdomen is soft. There is no mass.      Tenderness: There is no abdominal tenderness. There is no guarding or rebound.      Hernia: No hernia is present. There is no hernia in the left inguinal area or right inguinal area.   Genitourinary:     General: Normal vulva.      Labia:         Right: No rash, tenderness or lesion.         Left: No rash, tenderness or lesion.       Vagina: Normal.      Uterus: Absent.       Adnexa:         Right: No mass, tenderness or fullness.          Left: No mass, tenderness or fullness.     Musculoskeletal:      Cervical back: Normal range of motion and neck supple. No tenderness.   Lymphadenopathy:      Cervical: No cervical adenopathy.      Upper Body:      Right upper body: No supraclavicular, axillary or pectoral adenopathy.      Left upper body: No supraclavicular, axillary or pectoral adenopathy.      Lower Body: No right inguinal adenopathy. No left inguinal adenopathy.   Neurological:      Mental Status: She is alert.

## 2025-03-11 DIAGNOSIS — E55.9 VITAMIN D DEFICIENCY: ICD-10-CM

## 2025-03-11 RX ORDER — ERGOCALCIFEROL 1.25 MG/1
50000 CAPSULE, LIQUID FILLED ORAL WEEKLY
Qty: 12 CAPSULE | Refills: 0 | Status: SHIPPED | OUTPATIENT
Start: 2025-03-11

## 2025-03-24 DIAGNOSIS — Z71.84 TRAVEL ADVICE ENCOUNTER: Primary | ICD-10-CM

## 2025-03-24 RX ORDER — AZITHROMYCIN 250 MG/1
250 TABLET, FILM COATED ORAL EVERY 24 HOURS
Qty: 10 TABLET | Refills: 0 | Status: SHIPPED | OUTPATIENT
Start: 2025-03-24 | End: 2025-04-03

## 2025-03-26 DIAGNOSIS — F40.243 FEAR OF FLYING: Primary | ICD-10-CM

## 2025-03-26 RX ORDER — ALPRAZOLAM 0.5 MG
TABLET ORAL
Qty: 10 TABLET | Refills: 0 | Status: SHIPPED | OUTPATIENT
Start: 2025-03-26

## 2025-04-11 DIAGNOSIS — I10 BENIGN ESSENTIAL HYPERTENSION: ICD-10-CM

## 2025-04-11 RX ORDER — SPIRONOLACTONE 50 MG/1
50 TABLET, FILM COATED ORAL DAILY
Qty: 90 TABLET | Refills: 1 | Status: SHIPPED | OUTPATIENT
Start: 2025-04-11

## 2025-04-11 RX ORDER — AMLODIPINE BESYLATE 5 MG/1
5 TABLET ORAL DAILY
Qty: 90 TABLET | Refills: 1 | Status: SHIPPED | OUTPATIENT
Start: 2025-04-11

## 2025-04-23 DIAGNOSIS — I10 HYPERTENSION, UNSPECIFIED TYPE: ICD-10-CM

## 2025-04-23 RX ORDER — METOPROLOL SUCCINATE 50 MG/1
50 TABLET, EXTENDED RELEASE ORAL DAILY
Qty: 90 TABLET | Refills: 1 | Status: SHIPPED | OUTPATIENT
Start: 2025-04-23

## 2025-05-13 ENCOUNTER — PATIENT MESSAGE (OUTPATIENT)
Dept: INTERNAL MEDICINE CLINIC | Age: 55
End: 2025-05-13

## 2025-05-13 DIAGNOSIS — Z12.11 SCREENING FOR MALIGNANT NEOPLASM OF COLON: Primary | ICD-10-CM

## 2025-06-20 ENCOUNTER — TELEPHONE (OUTPATIENT)
Dept: GASTROENTEROLOGY | Facility: AMBULARY SURGERY CENTER | Age: 55
End: 2025-06-20

## 2025-06-20 DIAGNOSIS — E55.9 VITAMIN D DEFICIENCY: ICD-10-CM

## 2025-06-20 NOTE — TELEPHONE ENCOUNTER
Scheduled date of colonoscopy (as of today): 7/22/25  Physician performing colonoscopy: DAR  Location of colonoscopy: [facility]  Bowel prep reviewed with patient: SHANTELLE / JAVED  Instructions reviewed with patient by: SANDRINE  Clearances: N/A

## 2025-06-20 NOTE — LETTER
June 20, 2025    Christine M Wachter  [address]  [place] [address]        COLONOSCOPY  MIRALAX/Dulcolax Bowel Preparation Instructions    The OR/GI Lab will contact you the evening prior to your procedure with your exact arrival time.    Our practice requires a 1 week notice for any cancellations or rescheduling. We kindly ask that you immediately notify us of any changes including any new medications that are prescribed. Thank you for your cooperation.     WEEK BEFORE YOUR PROCEDURE:  Stop taking Iron tablets.  5 days prior, AVOID vegetables and fruits with skins or seeds, nuts, corn, popcorn and whole grain breads.   Purchase: One (1) 238-gram container of Miralax (polyethylene glycol 3350), four (4) 5 mg Dulcolax (bisacodyl) tablets, and one (1) 64-ounce bottle of Gatorade (sports drink) - no red, orange, or purple. These may be purchased at any pharmacy without a prescription. Generic products are permissible.   Arrange responsible transportation for day of the procedure.     DAY BEFORE THE PROCEDURE:   CLEAR liquids only for entire day prior. Nothing red, orange or purple.    You MAY have:                                                               Soda  Water  Broth Gatorade  Jello  Popsicles Coffee/tea without milk/creamer     YOU MAY NOT HAVE:  Solid foods   Milk and milk products    Juice with pulp    BOWEL PREPARATION:  Includes: One (1) 238-gram container of Miralax (polyethylene glycol 3350), four (4) 5 mg Dulcolax (bisacodyl) tablets, and one (1) 64-ounce bottle of Gatorade (sports drink).  Preparation may be refrigerated.  Entire bowel prep should be completed.     Afternoon before the procedure (2:00 pm - 5:00 pm):    Take two (2) 5 mg Dulcolax laxative tablets.     Evening before the procedure (6:00 pm):  Mix entire container of Miralax with one (1) 64-ounce bottle of Gatorade and shake until all medication is dissolved.   Begin drinking solution. Drink an eight (8) ounce cup every 10-15  minutes until you have consumed half (32 ounces) of the solution.  Refrigerate remaining solution.    Night before the procedure (8:00 pm):  Take two (2) 5 mg Dulcolax laxative tablets.     Beginning 5 hours before your procedure:  Drink the remaining amount of prepared solution (32 ounces).  Drink an eight (8) ounce cup every 10-15 minutes until you have consumed the remaining solution.     Bowel prep should be completed 4 hours prior to procedure time.    NOTHING TO EAT OR DRINK AFTER MIDNIGHT- EXCEPT FOR YOUR PREP    DAY OF THE PROCEDURE:  You may brush your teeth.  Leave all jewelry at home.  Please arrive for your procedure as indicated by the OR / GI Lab / Endoscopy Unit. The hospital will contact you the day before with your exact arrival time.   Make sure you have arranged ahead of time for a responsible adult (18 or older) to accompany and drive you home after the procedure.  Please discuss any transportation concerns with our staff prior to your procedure.    The effects of the anesthesia can persist for 24 hours.  After receiving the sedation, you must exercise caution before engaging in any activity that could harm yourself and others (such as driving a car).  Do not make any important decisions or do not drink any alcoholic beverages during this time period.  After your procedure, you may have anything you'd like to eat or drink.  You will probably want to start with something light.  Please include plenty of fluids.  Avoid items that cause gas such as sodas and salads.    SPECIAL INSTRUCTIONS:    For patients currently taking blood thinners and/or antiplatelet therapy our office will contact the prescribing provider.  Our office will contact you with any required changes to your medication regimen.     Blood thinner (i.e. - Coumadin, Pradaxa, Lovenox, Xarelto, Eliquis)  ?  Continue (Do Not Stop)  ? Stop______________for_____________days prior to the procedure.    Antiplatelet (i.e. - Plavix, Aggrenox,  Effient, Brilinta)  ?  Continue (Do Not Stop)  ? Stop______________for_____________days prior to the procedure.       Diabetes:   If you are Diabetic, please see separate Diabetic Instruction Sheet.          Prescribed medications:  Do not stop your aspirin, or any of your other medications (unless instructed otherwise).    Take the rest of your prescribed medications with small sips of water at least 2 hours prior to your procedure.      For any questions or concerns related to your bowel preparation or pre-procedure instructions, please contact our office at [phone].  Thank you for choosing [facility]!

## 2025-06-20 NOTE — TELEPHONE ENCOUNTER
06/20/25  Screened by: Alpa Castano    Referring Provider     Pre- Screening:     There is no height or weight on file to calculate BMI.  Has patient been referred for a routine screening Colonoscopy? yes  Is the patient between 45-75 years old? yes      Previous Colonoscopy yes   If yes:    Date:     Facility:     Reason:       Does the patient want to see a Gastroenterologist prior to their procedure OR are they having any GI symptoms? no    Has the patient been hospitalized or had abdominal surgery in the past 6 months? no    Does the patient use supplemental oxygen? no    Does the patient take Coumadin, Lovenox, Plavix, Elliquis, Xarelto, or other blood thinning medication? no    Has the patient had a stroke, cardiac event, or stent placed in the past year? no    If patient is between 45yrs - 49yrs, please advise patient that we will have to confirm benefits & coverage with their insurance company for a routine screening colonoscopy.

## 2025-06-23 RX ORDER — ERGOCALCIFEROL 1.25 MG/1
50000 CAPSULE, LIQUID FILLED ORAL WEEKLY
Qty: 12 CAPSULE | Refills: 0 | Status: SHIPPED | OUTPATIENT
Start: 2025-06-23

## 2025-07-15 DIAGNOSIS — F41.9 ANXIETY: ICD-10-CM

## 2025-07-15 RX ORDER — ALPRAZOLAM 0.25 MG
0.25 TABLET ORAL
Qty: 60 TABLET | Refills: 0 | Status: SHIPPED | OUTPATIENT
Start: 2025-07-15

## 2025-07-15 RX ORDER — ALPRAZOLAM 0.25 MG
0.25 TABLET ORAL
Qty: 60 TABLET | Refills: 0 | Status: SHIPPED | OUTPATIENT
Start: 2025-07-15 | End: 2025-07-15 | Stop reason: SDUPTHER

## 2025-07-22 ENCOUNTER — ANESTHESIA (OUTPATIENT)
Dept: GASTROENTEROLOGY | Facility: HOSPITAL | Age: 55
End: 2025-07-22
Payer: COMMERCIAL

## 2025-07-22 ENCOUNTER — ANESTHESIA EVENT (OUTPATIENT)
Dept: GASTROENTEROLOGY | Facility: HOSPITAL | Age: 55
End: 2025-07-22
Payer: COMMERCIAL

## 2025-07-22 ENCOUNTER — HOSPITAL ENCOUNTER (OUTPATIENT)
Dept: GASTROENTEROLOGY | Facility: HOSPITAL | Age: 55
Setting detail: OUTPATIENT SURGERY
Discharge: HOME | End: 2025-07-22
Attending: COLON & RECTAL SURGERY
Payer: COMMERCIAL

## 2025-07-22 VITALS
TEMPERATURE: 97.8 F | WEIGHT: 180 LBS | OXYGEN SATURATION: 95 % | RESPIRATION RATE: 18 BRPM | SYSTOLIC BLOOD PRESSURE: 129 MMHG | HEIGHT: 62 IN | HEART RATE: 81 BPM | DIASTOLIC BLOOD PRESSURE: 83 MMHG | BODY MASS INDEX: 33.13 KG/M2

## 2025-07-22 DIAGNOSIS — Z12.11 SCREENING FOR COLON CANCER: ICD-10-CM

## 2025-07-22 PROCEDURE — G0121 COLON CA SCRN NOT HI RSK IND: HCPCS | Performed by: COLON & RECTAL SURGERY

## 2025-07-22 RX ORDER — PROPOFOL 10 MG/ML
INJECTION, EMULSION INTRAVENOUS AS NEEDED
Status: DISCONTINUED | OUTPATIENT
Start: 2025-07-22 | End: 2025-07-22

## 2025-07-22 RX ORDER — SODIUM CHLORIDE 9 MG/ML
INJECTION, SOLUTION INTRAVENOUS CONTINUOUS PRN
Status: DISCONTINUED | OUTPATIENT
Start: 2025-07-22 | End: 2025-07-22

## 2025-07-22 RX ORDER — LIDOCAINE HYDROCHLORIDE 10 MG/ML
INJECTION, SOLUTION EPIDURAL; INFILTRATION; INTRACAUDAL; PERINEURAL AS NEEDED
Status: DISCONTINUED | OUTPATIENT
Start: 2025-07-22 | End: 2025-07-22

## 2025-07-22 RX ADMIN — LIDOCAINE HYDROCHLORIDE 50 MG: 10 INJECTION, SOLUTION EPIDURAL; INFILTRATION; INTRACAUDAL at 08:33

## 2025-07-22 RX ADMIN — PROPOFOL 50 MG: 10 INJECTION, EMULSION INTRAVENOUS at 08:43

## 2025-07-22 RX ADMIN — SODIUM CHLORIDE: 0.9 INJECTION, SOLUTION INTRAVENOUS at 08:30

## 2025-07-22 RX ADMIN — PROPOFOL 140 MCG/KG/MIN: 10 INJECTION, EMULSION INTRAVENOUS at 08:36

## 2025-07-22 RX ADMIN — Medication 40 MG: at 08:48

## 2025-07-22 RX ADMIN — PROPOFOL 100 MG: 10 INJECTION, EMULSION INTRAVENOUS at 08:33

## 2025-07-22 NOTE — H&P
History and Physical   Colon and Rectal Surgery   Christine M Wachter 55 y.o. female MRN: [id number]  Unit/Bed#:  Encounter: [id number]  07/22/25   @[facility]    No chief complaint on file.        History of Present Illness   HPI:  Christine M Wachter is a 55 y.o. female who presents for colonoscopy for personal history of polyps.      Historical Information   Past Medical History[1]  Past Surgical History[2]    Meds/Allergies     Not in a hospital admission.    Current Medications[3]    Allergies[4]      Social History   Social History     Substance and Sexual Activity   Alcohol Use Yes    Comment: 2 glasses of wine per month     Social History     Substance and Sexual Activity   Drug Use No     Tobacco Use History[5]      Family History: Family History[6]      Objective     Current Vitals:      No intake or output data in the 24 hours ending 07/22/25 0732    Physical Exam:  General:  Resting comfortably in bed   Eyes:Sclera anicteric  ENT: Trachea midline  Pulm:  Symmetric chest raise.  No respiratory Distress  CV:  Regular on monitor  Abdomen:  Soft NT ND  Extremities:  No clubbing/ cyanosis/ edema    Lab Results: I have personally reviewed pertinent lab results.    Imaging: Results Review Statement: No pertinent imaging studies reviewed.      ASSESSMENT:  Christine M Wachter is a 55 y.o. female who presents for outpatient colonoscopy.      PLAN:  For colonoscopy    Risks/ Benefits reviewed to include but not limited to anesthesia, bleeding, missed lesions, and colonoscopic perforation requiring surgery.           [1]   Past Medical History:  Diagnosis Date    Abnormal uterine bleeding (AUB)     Acute COVID-19 07/19/2022    Anemia     Anxiety     BMI 32.0-32.9,adult 11/14/2022    COVID 07/16/2022    Endometrial polyp     High triglycerides     Hypertension     Seasonal allergies     Shingles 11/03/2023    Sleep apnea     uses mouth guard    Travel advice encounter 03/10/2023    Umbilical hernia     Vitamin D deficiency      Wears contact lenses    [2]   Past Surgical History:  Procedure Laterality Date    BREAST SURGERY       SECTION      x3    CYSTOSCOPY N/A 2020    Procedure: CYSTOSCOPY;  Surgeon: Ramos Catherine DO;  Location: [facility];  Service: Gynecology    HYSTERECTOMY N/A 2020    Procedure: HYSTERECTOMY LAPAROSCOPIC TOTAL (LTH) WITH B/L SALPINGECTOMY;  Surgeon: Ramos Catherine DO;  Location: [facility];  Service: Gynecology    HYSTEROSCOPY W/ POLYPECTOMY      MO HYSTEROSCOPY BX ENDOMETRIUM&/POLYPC W/WO D&C N/A 2019    Procedure: DILATATION AND CURETTAGE (D&C) WITH HYSTEROSCOPY WITH POLYPECTOMY;  Surgeon: Ramos Catherine DO;  Location: [facility];  Service: Gynecology    MO HYSTEROSCOPY ENDOMETRIAL ABLATION N/A 2019    Procedure: ABLATION ENDOMETRIAL NOVASURE;  Surgeon: Ramos Catherine DO;  Location: [facility];  Service: Gynecology    REDUCTION MAMMAPLASTY      TUBAL LIGATION Bilateral     WISDOM TOOTH EXTRACTION     [3]   Current Outpatient Medications:     acyclovir (ZOVIRAX) 5 % ointment, Apply topically 4 (four) times a day PRN (Patient not taking: Reported on 2024), Disp: 15 g, Rfl: 5    ALPRAZolam (XANAX) 0.25 mg tablet, Take 1 tablet (0.25 mg total) by mouth daily at bedtime as needed for anxiety May take 1 extra tab daily if needed, Disp: 60 tablet, Rfl: 0    ALPRAZolam (XANAX) 0.5 mg tablet, 1 to 2 tablets as needed for flying.  Do not exceed 2 tablets in 24 hours, Disp: 10 tablet, Rfl: 0    amLODIPine (NORVASC) 5 mg tablet, take 1 tablet by mouth once daily, Disp: 90 tablet, Rfl: 1    ergocalciferol (VITAMIN D2) 50,000 units, Take 1 capsule (50,000 Units total) by mouth once a week, Disp: 12 capsule, Rfl: 0    estradiol (ESTRACE) 0.1 mg/g vaginal cream, 1 g vaginally  for 3 weeks then once weekly, Disp: 42.5 g, Rfl: 3    metoprolol succinate (TOPROL-XL) 50 mg 24 hr tablet, take 1 tablet by mouth once daily, Disp: 90 tablet, Rfl: 1     spironolactone (ALDACTONE) 50 mg tablet, take 1 tablet by mouth once daily, Disp: 90 tablet, Rfl: 1  [4]   Allergies  Allergen Reactions    Levofloxacin Rash   [5]   Social History  Tobacco Use   Smoking Status Former    Current packs/day: 0.00    Average packs/day: 0.3 packs/day for 4.0 years (1.0 ttl pk-yrs)    Types: Cigarettes    Start date: 1985    Quit date: 1989    Years since quittin.5   Smokeless Tobacco Never   Tobacco Comments    smoked for 1 year in college   [6]   Family History  Problem Relation Name Age of Onset    Hypertension Father Waqar Paini         BENIGN ESSENTIAL    Hyperlipidemia Father Waqar Paini     No Known Problems Mother

## 2025-07-22 NOTE — ANESTHESIA PREPROCEDURE EVALUATION
Procedure:  COLONOSCOPY    Relevant Problems   CARDIO   (+) Benign essential hypertension   (+) Essential hypertriglyceridemia   (+) White coat syndrome with diagnosis of hypertension      GI/HEPATIC   (+) Rectal bleeding      NEURO/PSYCH   (+) Generalized anxiety disorder      PULMONARY   (+) LATOSHA (obstructive sleep apnea)        Physical Exam    Airway     Mallampati score: II  TM Distance: >3 FB  Neck ROM: full      Cardiovascular  Rhythm: regular, Rate: normal    Dental   No notable dental hx     Pulmonary   Breath sounds clear to auscultation    Neurological  - normal exam    Other Findings  post-pubertal.      Anesthesia Plan  ASA Score- 2     Anesthesia Type- IV sedation with anesthesia with ASA Monitors.         Additional Monitors:     Airway Plan: natural airway.           Plan Factors-Exercise tolerance (METS): >4 METS.    Chart reviewed.   Existing labs reviewed. Patient summary reviewed.    Patient is not a current smoker.              Induction- intravenous.    Postoperative Plan- .   Monitoring Plan - Monitoring plan - standard ASA monitoring          Informed Consent- Anesthetic plan and risks discussed with patient.  I personally reviewed this patient with the CRNA. Discussed and agreed on the Anesthesia Plan with the CRNA..      NPO Status:  Vitals Value Taken Time   Date of last liquid 07/22/25 07/22/25 07:56   Time of last liquid 0330 07/22/25 07:56   Date of last solid 07/20/25 07/22/25 07:56   Time of last solid 2200 07/22/25 07:56

## (undated) DEVICE — INSUFFLATION NEEDLE TO ESTABLISH PNEUMOPERITONEUM.: Brand: INSUFFLATION NEEDLE

## (undated) DEVICE — GLOVE INDICATOR PI UNDERGLOVE SZ 8 BLUE

## (undated) DEVICE — UTERINE MANIPULATOR RUMI 6.7 X 6 CM

## (undated) DEVICE — CHLORAPREP HI-LITE 26ML ORANGE

## (undated) DEVICE — SPECIMEN SOCK - SHORT: Brand: MEDI-VAC

## (undated) DEVICE — IV EXTENSION TUBING 33 IN

## (undated) DEVICE — TROCAR: Brand: KII FIOS FIRST ENTRY

## (undated) DEVICE — TUBE INFLOW OUTFLOW Y TUBING F/FLUID CONTROL SYSTEM AQUILEX

## (undated) DEVICE — UNDYED BRAIDED (POLYGLACTIN 910), SYNTHETIC ABSORBABLE SUTURE: Brand: COATED VICRYL

## (undated) DEVICE — 3000CC GUARDIAN II: Brand: GUARDIAN

## (undated) DEVICE — SUT VICRYL 0 CT-1 36 IN J946H

## (undated) DEVICE — 2000CC GUARDIAN II: Brand: GUARDIAN

## (undated) DEVICE — DEVICE MYOSURE TISSUE REMOVAL HYSTEROSCOPIC XLRG

## (undated) DEVICE — BLUE HEAT SCOPE WARMER

## (undated) DEVICE — PREMIUM DRY TRAY LF: Brand: MEDLINE INDUSTRIES, INC.

## (undated) DEVICE — ELECTRODE LAP J HOOK E-Z CLEAN 33CM-0021

## (undated) DEVICE — BETHLEHEM UNIVERSAL GYN LAP PK: Brand: CARDINAL HEALTH

## (undated) DEVICE — GLOVE INDICATOR PI UNDERGLOVE SZ 7 BLUE

## (undated) DEVICE — PENCIL ELECTROSURG E-Z CLEAN -0035H

## (undated) DEVICE — TUBING SUCTION 5MM X 12 FT

## (undated) DEVICE — LAPAROSCOPIC SMOKE EVAC TUBING

## (undated) DEVICE — OCCLUDER COLPO-PNEUMO

## (undated) DEVICE — INTENDED FOR TISSUE SEPARATION, AND OTHER PROCEDURES THAT REQUIRE A SHARP SURGICAL BLADE TO PUNCTURE OR CUT.: Brand: BARD-PARKER SAFETY BLADES SIZE 11, STERILE

## (undated) DEVICE — NOVASURE KIT

## (undated) DEVICE — SUT PLAIN 3-0 PS-1 27 IN 1640H

## (undated) DEVICE — DRAPE SURGIKIT SADDLE BAG LAP

## (undated) DEVICE — GLOVE PI ULTRA TOUCH SZ.7.5

## (undated) DEVICE — GLOVE PI ULTRA TOUCH SZ.6.5

## (undated) DEVICE — SCD SEQUENTIAL COMPRESSION COMFORT SLEEVE MEDIUM KNEE LENGTH: Brand: KENDALL SCD

## (undated) DEVICE — GLOVE INDICATOR PI UNDERGLOVE SZ 7.5 BLUE

## (undated) DEVICE — TRAY FOLEY 16FR URIMETER SILICONE SURESTEP

## (undated) DEVICE — TROCAR: Brand: KII SLEEVE

## (undated) DEVICE — GLOVE PI ULTRA TOUCH SZ.7.0

## (undated) DEVICE — SYRINGE 50ML LL

## (undated) DEVICE — IRRIG ENDO FLO TUBING

## (undated) DEVICE — STRL ALLENTOWN HYSTEROSCOPY PK: Brand: CARDINAL HEALTH

## (undated) DEVICE — SMARTGOWN SURGICAL GOWN, 3XL, LONG: Brand: CONVERTORS

## (undated) DEVICE — MYOSURE SEAL SET

## (undated) DEVICE — PVC URETHRAL CATHETER: Brand: DOVER

## (undated) DEVICE — CYSTO TUBING SINGLE IRRIGATION

## (undated) DEVICE — LIGASURE LAP SLR/DIV MARYLAND 5MM

## (undated) DEVICE — POOLE SUCTION HANDLE: Brand: CARDINAL HEALTH

## (undated) DEVICE — MAYO STAND COVER: Brand: CONVERTORS

## (undated) DEVICE — [HIGH FLOW INSUFFLATOR,  DO NOT USE IF PACKAGE IS DAMAGED,  KEEP DRY,  KEEP AWAY FROM SUNLIGHT,  PROTECT FROM HEAT AND RADIOACTIVE SOURCES.]: Brand: PNEUMOSURE

## (undated) DEVICE — GLOVE INDICATOR PI UNDERGLOVE SZ 6.5 BLUE

## (undated) DEVICE — ASTOUND STANDARD SURGICAL GOWN, XL: Brand: CONVERTORS

## (undated) DEVICE — PLASTIC ADHESIVE BANDAGE: Brand: CURITY